# Patient Record
Sex: MALE | Race: OTHER | NOT HISPANIC OR LATINO
[De-identification: names, ages, dates, MRNs, and addresses within clinical notes are randomized per-mention and may not be internally consistent; named-entity substitution may affect disease eponyms.]

---

## 2023-05-26 PROBLEM — Z00.00 ENCOUNTER FOR PREVENTIVE HEALTH EXAMINATION: Status: ACTIVE | Noted: 2023-05-26

## 2023-05-30 ENCOUNTER — NON-APPOINTMENT (OUTPATIENT)
Age: 73
End: 2023-05-30

## 2023-05-31 ENCOUNTER — OUTPATIENT (OUTPATIENT)
Dept: OUTPATIENT SERVICES | Facility: HOSPITAL | Age: 73
LOS: 1 days | Discharge: HOME | End: 2023-05-31
Payer: COMMERCIAL

## 2023-05-31 ENCOUNTER — APPOINTMENT (OUTPATIENT)
Dept: WOUND CARE | Facility: HOSPITAL | Age: 73
End: 2023-05-31
Payer: COMMERCIAL

## 2023-05-31 VITALS
OXYGEN SATURATION: 98 % | HEIGHT: 70 IN | HEART RATE: 54 BPM | WEIGHT: 175 LBS | BODY MASS INDEX: 25.05 KG/M2 | DIASTOLIC BLOOD PRESSURE: 96 MMHG | RESPIRATION RATE: 18 BRPM | SYSTOLIC BLOOD PRESSURE: 206 MMHG

## 2023-05-31 DIAGNOSIS — Z78.9 OTHER SPECIFIED HEALTH STATUS: ICD-10-CM

## 2023-05-31 DIAGNOSIS — E55.9 VITAMIN D DEFICIENCY, UNSPECIFIED: ICD-10-CM

## 2023-05-31 DIAGNOSIS — L97.421 NON-PRESSURE CHRONIC ULCER OF LEFT HEEL AND MIDFOOT LIMITED TO BREAKDOWN OF SKIN: ICD-10-CM

## 2023-05-31 DIAGNOSIS — L08.9 LOCAL INFECTION OF THE SKIN AND SUBCUTANEOUS TISSUE, UNSPECIFIED: ICD-10-CM

## 2023-05-31 DIAGNOSIS — E78.5 HYPERLIPIDEMIA, UNSPECIFIED: ICD-10-CM

## 2023-05-31 DIAGNOSIS — E11.9 TYPE 2 DIABETES MELLITUS W/OUT COMPLICATIONS: ICD-10-CM

## 2023-05-31 DIAGNOSIS — I10 ESSENTIAL (PRIMARY) HYPERTENSION: ICD-10-CM

## 2023-05-31 DIAGNOSIS — E03.9 HYPOTHYROIDISM, UNSPECIFIED: ICD-10-CM

## 2023-05-31 DIAGNOSIS — L97.529 NON-PRESSURE CHRONIC ULCER OF OTHER PART OF LEFT FOOT WITH UNSPECIFIED SEVERITY: ICD-10-CM

## 2023-05-31 DIAGNOSIS — S91.309A UNSPECIFIED OPEN WOUND, UNSPECIFIED FOOT, INITIAL ENCOUNTER: ICD-10-CM

## 2023-05-31 PROCEDURE — 87070 CULTURE OTHR SPECIMN AEROBIC: CPT

## 2023-05-31 PROCEDURE — 87077 CULTURE AEROBIC IDENTIFY: CPT

## 2023-05-31 PROCEDURE — G0463: CPT

## 2023-05-31 PROCEDURE — 87186 SC STD MICRODIL/AGAR DIL: CPT

## 2023-05-31 PROCEDURE — 99203 OFFICE O/P NEW LOW 30 MIN: CPT

## 2023-05-31 RX ORDER — HYDROCHLOROTHIAZIDE 12.5 MG/1
12.5 TABLET ORAL DAILY
Refills: 0 | Status: ACTIVE | COMMUNITY
Start: 2023-05-31

## 2023-05-31 RX ORDER — METOPROLOL SUCCINATE 25 MG/1
25 TABLET, EXTENDED RELEASE ORAL
Qty: 30 | Refills: 0 | Status: ACTIVE | COMMUNITY
Start: 2023-05-31

## 2023-05-31 RX ORDER — SITAGLIPTIN AND METFORMIN HYDROCHLORIDE 50; 1000 MG/1; MG/1
50-1000 TABLET, FILM COATED ORAL DAILY
Refills: 0 | Status: ACTIVE | COMMUNITY
Start: 2023-05-31

## 2023-05-31 RX ORDER — ERGOCALCIFEROL 1.25 MG/1
1.25 MG CAPSULE, LIQUID FILLED ORAL WEEKLY
Refills: 0 | Status: ACTIVE | COMMUNITY
Start: 2023-05-31

## 2023-05-31 RX ORDER — ASPIRIN ENTERIC COATED TABLETS 81 MG 81 MG/1
81 TABLET, DELAYED RELEASE ORAL
Refills: 0 | Status: ACTIVE | COMMUNITY
Start: 2023-05-31

## 2023-06-02 LAB
-  AMOXICILLIN/CLAVULANIC ACID: SIGNIFICANT CHANGE UP
-  AMPICILLIN/SULBACTAM: SIGNIFICANT CHANGE UP
-  AMPICILLIN: SIGNIFICANT CHANGE UP
-  CEFAZOLIN: SIGNIFICANT CHANGE UP
-  CEFTRIAXONE: SIGNIFICANT CHANGE UP
-  CIPROFLOXACIN: SIGNIFICANT CHANGE UP
-  CLINDAMYCIN: SIGNIFICANT CHANGE UP
-  DAPTOMYCIN: SIGNIFICANT CHANGE UP
-  ERYTHROMYCIN: SIGNIFICANT CHANGE UP
-  GENTAMICIN: SIGNIFICANT CHANGE UP
-  LEVOFLOXACIN: SIGNIFICANT CHANGE UP
-  LINEZOLID: SIGNIFICANT CHANGE UP
-  MEROPENEM: SIGNIFICANT CHANGE UP
-  MOXIFLOXACIN: SIGNIFICANT CHANGE UP
-  OXACILLIN: SIGNIFICANT CHANGE UP
-  PENICILLIN: SIGNIFICANT CHANGE UP
-  RIFAMPIN: SIGNIFICANT CHANGE UP
-  TETRACYCLINE: SIGNIFICANT CHANGE UP
-  TRIMETHOPRIM/SULFAMETHOXAZOLE: SIGNIFICANT CHANGE UP
-  VANCOMYCIN: SIGNIFICANT CHANGE UP
CULTURE RESULTS: SIGNIFICANT CHANGE UP
METHOD TYPE: SIGNIFICANT CHANGE UP
ORGANISM # SPEC MICROSCOPIC CNT: SIGNIFICANT CHANGE UP
ORGANISM # SPEC MICROSCOPIC CNT: SIGNIFICANT CHANGE UP
SPECIMEN SOURCE: SIGNIFICANT CHANGE UP

## 2023-06-05 DIAGNOSIS — L97.422 NON-PRESSURE CHRONIC ULCER OF LEFT HEEL AND MIDFOOT WITH FAT LAYER EXPOSED: ICD-10-CM

## 2023-06-05 DIAGNOSIS — Z79.84 LONG TERM (CURRENT) USE OF ORAL HYPOGLYCEMIC DRUGS: ICD-10-CM

## 2023-06-05 DIAGNOSIS — E11.621 TYPE 2 DIABETES MELLITUS WITH FOOT ULCER: ICD-10-CM

## 2023-06-05 DIAGNOSIS — E11.40 TYPE 2 DIABETES MELLITUS WITH DIABETIC NEUROPATHY, UNSPECIFIED: ICD-10-CM

## 2023-06-05 DIAGNOSIS — I10 ESSENTIAL (PRIMARY) HYPERTENSION: ICD-10-CM

## 2023-06-05 DIAGNOSIS — E78.5 HYPERLIPIDEMIA, UNSPECIFIED: ICD-10-CM

## 2023-06-05 DIAGNOSIS — Z79.899 OTHER LONG TERM (CURRENT) DRUG THERAPY: ICD-10-CM

## 2023-06-05 DIAGNOSIS — Z79.890 HORMONE REPLACEMENT THERAPY: ICD-10-CM

## 2023-06-05 DIAGNOSIS — Z79.82 LONG TERM (CURRENT) USE OF ASPIRIN: ICD-10-CM

## 2023-06-05 PROBLEM — L97.421: Status: ACTIVE | Noted: 2023-06-05

## 2023-06-05 NOTE — REVIEW OF SYSTEMS
[Arthralgias] : arthralgias [Joint Stiffness] : joint stiffness [Skin Wound] : skin wound [Negative] : Heme/Lymph [Fever] : no fever [Chills] : no chills [Eye Pain] : no eye pain [Earache] : no earache [Shortness Of Breath] : no shortness of breath [Wheezing] : no wheezing [Abdominal Pain] : no abdominal pain [Anxiety] : no anxiety [FreeTextEntry5] : HTN, HLD [de-identified] : DFU 3 plantar left 1st metatarsal head , clean , granular , skin ,subcutaneous and fat  [de-identified] : NIDDM with neuropathy  [de-identified] : NIDDM

## 2023-06-05 NOTE — PHYSICAL EXAM
[2 x 2] : 2 x 2  [0] : left 0 [1+] : left 1+ [Ankle Swelling (On Exam)] : present [Ankle Swelling Bilaterally] : bilaterally  [Varicose Veins Of Lower Extremities] : bilaterally [Ankle Swelling On The Right] : mild [Skin Ulcer] : ulcer [Alert] : alert [Oriented to Person] : oriented to person [Oriented to Place] : oriented to place [Oriented to Time] : oriented to time [Calm] : calm [] : not present [Purpura] : no purpura  [Petechiae] : no petechiae [Skin Induration] : no induration [de-identified] : calm [de-identified] : HTN, HLD [de-identified] : DFU 3 plantar left 1st metatarsal head  [FreeTextEntry1] : Plantar Foot- 1st metatarsal [FreeTextEntry2] : 0.5 [FreeTextEntry3] : 0.8 [FreeTextEntry4] : 0.3 [de-identified] : intact [de-identified] : Alginate Ag [de-identified] : Mechanically cleansed with 0.9% NS and sterile gauze\par \par Dry Dressing\par \par Medipore tape\par \par Kerlix [TWNoteComboBox1] : Left [TWNoteComboBox4] : None [TWNoteComboBox5] : No [TWNoteComboBox6] : Diabetic [de-identified] : No [de-identified] : None [de-identified] : None [de-identified] : 100% [de-identified] : No [de-identified] : 3x Weekly [de-identified] : Primary Dressing

## 2023-06-05 NOTE — ASSESSMENT
[Verbal] : Verbal [Demo] : Demo [Patient] : Patient [Good - alert, interested, motivated] : Good - alert, interested, motivated [Verbalizes knowledge/Understanding] : Verbalizes knowledge/understanding [Dressing changes] : dressing changes [Foot Care] : foot care [Skin Care] : skin care [Pressure relief] : pressure relief [Signs and symptoms of infection] : sign and symptoms of infection [Nutrition] : nutrition [How and When to Call] : how and when to call [Off-loading] : off-loading [Patient responsibility to plan of care] : patient responsibility to plan of care [] : Yes [Home] : Home [Stable] : stable [Ambulatory] : Ambulatory [Not Applicable - Long Term Care/Home Health Agency] : Long Term Care/Home Health Agency: Not Applicable [Glycemic Control] : glycemic control [FreeTextEntry2] : Pt will maintain skin integrity\par Pt will monitor wound for s/s of infection\par Pt will adhere to proper diet for optimal wound healing\par  [FreeTextEntry3] : N/A Initial assessment [FreeTextEntry4] : Xray of left foot ordered.  \par Labs ordered\par \par Authorization for vascular studies submitted\par MRI results to be obtained from [facility].\par \par Follow up in 1 week.

## 2023-06-05 NOTE — PLAN
[FreeTextEntry1] : Patient had MRI at out side facility , patient sent for x rays , blood tests and vascular studies . PTR 1 week to review all studies . Continue wound care and off loading , PTR 1week Patient was told if there are signs of infection ( fever, chills, nausea, vomiting ) or changes in the condition of the wound ( pain , cellulitis , purulent drainage or odor ) they should proceed to the ER as soon as possible\par Spent 30 minutes for patient care and medical decision making.\par

## 2023-06-05 NOTE — HISTORY OF PRESENT ILLNESS
[FreeTextEntry1] : DFU 3 plantar left 1st metatarsal head , skin , subcutaneous and fat , 3 months duration

## 2023-06-06 ENCOUNTER — NON-APPOINTMENT (OUTPATIENT)
Age: 73
End: 2023-06-06

## 2023-06-07 ENCOUNTER — OUTPATIENT (OUTPATIENT)
Dept: OUTPATIENT SERVICES | Facility: HOSPITAL | Age: 73
LOS: 1 days | Discharge: HOME | End: 2023-06-07
Payer: COMMERCIAL

## 2023-06-07 ENCOUNTER — EMERGENCY (EMERGENCY)
Facility: HOSPITAL | Age: 73
LOS: 1 days | End: 2023-06-07
Payer: COMMERCIAL

## 2023-06-07 ENCOUNTER — APPOINTMENT (OUTPATIENT)
Dept: WOUND CARE | Facility: HOSPITAL | Age: 73
End: 2023-06-07
Payer: COMMERCIAL

## 2023-06-07 VITALS
DIASTOLIC BLOOD PRESSURE: 95 MMHG | RESPIRATION RATE: 16 BRPM | OXYGEN SATURATION: 97 % | SYSTOLIC BLOOD PRESSURE: 212 MMHG | HEART RATE: 63 BPM

## 2023-06-07 VITALS
HEIGHT: 70 IN | DIASTOLIC BLOOD PRESSURE: 96 MMHG | SYSTOLIC BLOOD PRESSURE: 219 MMHG | RESPIRATION RATE: 18 BRPM | BODY MASS INDEX: 25.05 KG/M2 | WEIGHT: 175 LBS | HEART RATE: 52 BPM | OXYGEN SATURATION: 98 % | TEMPERATURE: 97.7 F

## 2023-06-07 DIAGNOSIS — S91.309A UNSPECIFIED OPEN WOUND, UNSPECIFIED FOOT, INITIAL ENCOUNTER: ICD-10-CM

## 2023-06-07 PROCEDURE — L9992: CPT

## 2023-06-07 PROCEDURE — 99213 OFFICE O/P EST LOW 20 MIN: CPT

## 2023-06-07 PROCEDURE — G0463: CPT

## 2023-06-07 RX ORDER — LEVOTHYROXINE SODIUM 0.03 MG/1
25 TABLET ORAL
Refills: 0 | Status: DISCONTINUED | COMMUNITY
Start: 2023-05-31 | End: 2023-06-07

## 2023-06-07 RX ORDER — LEVOTHYROXINE SODIUM 0.05 MG/1
50 TABLET ORAL DAILY
Qty: 90 | Refills: 1 | Status: ACTIVE | COMMUNITY
Start: 2023-06-07

## 2023-06-07 RX ORDER — AMOXICILLIN AND CLAVULANATE POTASSIUM 500; 125 MG/1; MG/1
500-125 TABLET, FILM COATED ORAL
Refills: 0 | Status: DISCONTINUED | COMMUNITY
Start: 2023-05-31 | End: 2023-06-07

## 2023-06-07 RX ORDER — ATORVASTATIN CALCIUM 10 MG/1
10 TABLET, FILM COATED ORAL
Refills: 0 | Status: ACTIVE | COMMUNITY
Start: 2023-05-31

## 2023-06-07 NOTE — HISTORY OF PRESENT ILLNESS
[FreeTextEntry1] : 71 yo IM, here for f/u of chronic left plantar DFU-[employer] gr. 3.  Pt states he went to Dr. Amezcua's office 2 wks ago and had a MRI of his LLE-no report available today. Pt also advised to go for Xrays of botth feet last wk, but declined saying he had them done 2 wks ago at his podiatrist's office-no report available. Podiatrist office called today, but closed. Going for vascular studies tomorrow and then to meet with our vasc. surgeon, Dr. Luna.\par    In addition, pt's BP continues to be very high at 200s/96. Denies any HA, visual problems, dizziness, nausea,or weakness. Pt states he is under the care of his PCP, Dr. JANESSA Cain, who changed his meds recently.  Due to his high BP , I strongly advised pt to go to the ER now to get it under control. I emphasized the higher risks of CVA, MI, DVT, and even death with such high BP. Pt states he is going to see Dr. Cain after leaving here. I still recommended he go to the ER for further tx/assessment, but pt refusing. BP rechecked prior to leaving and again noted to be in the 200s/90s.\par \par \par Addendum-after pt had left, about one hr later, got fax of his left foot MRI which showed small ulcer, but no osteo.

## 2023-06-07 NOTE — PLAN
[FreeTextEntry1] : offload\par ag alginate, DD\par get Xray , MRI reports\par vascular studies this wk then vascular consult\par f/u 1 wk\par \par time spent- 29 mins.

## 2023-06-07 NOTE — PHYSICAL EXAM
[2 x 2] : 2 x 2  [Normal Thyroid] : the thyroid was normal [Normal Breath Sounds] : Normal breath sounds [Normal Heart Sounds] : normal heart sounds [Normal Rate and Rhythm] : normal rate and rhythm [Alert] : alert [Oriented to Person] : oriented to person [Oriented to Place] : oriented to place [Oriented to Time] : oriented to time [Anxious] : anxious [JVD] : no jugular venous distention  [Abdomen Masses] : No abdominal massess [Abdomen Tenderness] : ~T ~M No abdominal tenderness [Tender] : nontender [Enlarged] : not enlarged [de-identified] : elderly IM, NAD, alert, Ox3. Denies any sx. [FreeTextEntry1] : Plantar Foot- 1st metatarsal [FreeTextEntry2] : 0.5 [FreeTextEntry3] : 0.5 [FreeTextEntry4] : 0.2 [de-identified] : intact [de-identified] : Alginate Ag [de-identified] : Mechanically cleansed with 0.9% NS and sterile gauze\par \par Medipore tape [TWNoteComboBox1] : Left [TWNoteComboBox4] : None [TWNoteComboBox5] : No [TWNoteComboBox6] : Diabetic [de-identified] : No [de-identified] : None [de-identified] : None [de-identified] : 100% [de-identified] : No [de-identified] : 3x Weekly [de-identified] : Primary Dressing

## 2023-06-07 NOTE — ASSESSMENT
[Verbal] : Verbal [Demo] : Demo [Patient] : Patient [Good - alert, interested, motivated] : Good - alert, interested, motivated [Verbalizes knowledge/Understanding] : Verbalizes knowledge/understanding [Dressing changes] : dressing changes [Skin Care] : skin care [Signs and symptoms of infection] : sign and symptoms of infection [Nutrition] : nutrition [How and When to Call] : how and when to call [Off-loading] : off-loading [Patient responsibility to plan of care] : patient responsibility to plan of care [Stable] : stable [Home] : Home [Ambulatory] : Ambulatory [Not Applicable - Long Term Care/Home Health Agency] : Long Term Care/Home Health Agency: Not Applicable [] : No [FreeTextEntry2] : Pt will maintain skin integrity\par Pt will monitor wound for s/s of infection\par Pt will adhere to proper diet for optimal wound healing [FreeTextEntry4] : ***PATIENT EDUCATED ABOUT RISKS DANGEROUSLY HIGH BLOOD PRESSURE (219/96) AND URGED TO GO TO ED***\par \par Complete Vascular studies 06/08/2023\par Follow up with Dr. Chua (not scheduled)\par Follow up in 1 week\par \par RN spoke with Pt's primary DPM via telephone.  DPM faxed X-ray and MRI report. Will be scanned into pt's chart

## 2023-06-07 NOTE — REVIEW OF SYSTEMS
[Arthralgias] : arthralgias [Joint Stiffness] : joint stiffness [Skin Wound] : skin wound [Negative] : Heme/Lymph [Fever] : no fever [Chills] : no chills [Eye Pain] : no eye pain [Earache] : no earache [Shortness Of Breath] : no shortness of breath [Wheezing] : no wheezing [Abdominal Pain] : no abdominal pain [Anxiety] : no anxiety [FreeTextEntry5] : HTN, HLD [de-identified] : DFU 3 plantar left 1st metatarsal head , clean , granular , skin ,subcutaneous and fat  [de-identified] : NIDDM with neuropathy  [de-identified] : NIDDM

## 2023-06-08 ENCOUNTER — OUTPATIENT (OUTPATIENT)
Dept: OUTPATIENT SERVICES | Facility: HOSPITAL | Age: 73
LOS: 1 days | End: 2023-06-08
Payer: COMMERCIAL

## 2023-06-08 DIAGNOSIS — E11.621 TYPE 2 DIABETES MELLITUS WITH FOOT ULCER: ICD-10-CM

## 2023-06-08 DIAGNOSIS — Z79.899 OTHER LONG TERM (CURRENT) DRUG THERAPY: ICD-10-CM

## 2023-06-08 DIAGNOSIS — Z79.890 HORMONE REPLACEMENT THERAPY: ICD-10-CM

## 2023-06-08 DIAGNOSIS — I10 ESSENTIAL (PRIMARY) HYPERTENSION: ICD-10-CM

## 2023-06-08 DIAGNOSIS — E11.40 TYPE 2 DIABETES MELLITUS WITH DIABETIC NEUROPATHY, UNSPECIFIED: ICD-10-CM

## 2023-06-08 DIAGNOSIS — L97.422 NON-PRESSURE CHRONIC ULCER OF LEFT HEEL AND MIDFOOT WITH FAT LAYER EXPOSED: ICD-10-CM

## 2023-06-08 DIAGNOSIS — E03.9 HYPOTHYROIDISM, UNSPECIFIED: ICD-10-CM

## 2023-06-08 DIAGNOSIS — Z79.82 LONG TERM (CURRENT) USE OF ASPIRIN: ICD-10-CM

## 2023-06-08 DIAGNOSIS — Z79.84 LONG TERM (CURRENT) USE OF ORAL HYPOGLYCEMIC DRUGS: ICD-10-CM

## 2023-06-08 DIAGNOSIS — E55.9 VITAMIN D DEFICIENCY, UNSPECIFIED: ICD-10-CM

## 2023-06-08 DIAGNOSIS — E78.5 HYPERLIPIDEMIA, UNSPECIFIED: ICD-10-CM

## 2023-06-08 PROCEDURE — 93923 UPR/LXTR ART STDY 3+ LVLS: CPT | Mod: 26

## 2023-06-08 PROCEDURE — 93923 UPR/LXTR ART STDY 3+ LVLS: CPT

## 2023-06-13 ENCOUNTER — NON-APPOINTMENT (OUTPATIENT)
Age: 73
End: 2023-06-13

## 2023-06-14 ENCOUNTER — APPOINTMENT (OUTPATIENT)
Dept: WOUND CARE | Facility: HOSPITAL | Age: 73
End: 2023-06-14
Payer: COMMERCIAL

## 2023-06-14 ENCOUNTER — OUTPATIENT (OUTPATIENT)
Dept: OUTPATIENT SERVICES | Facility: HOSPITAL | Age: 73
LOS: 1 days | Discharge: HOME | End: 2023-06-14
Payer: COMMERCIAL

## 2023-06-14 VITALS
BODY MASS INDEX: 25.05 KG/M2 | RESPIRATION RATE: 18 BRPM | WEIGHT: 175 LBS | OXYGEN SATURATION: 98 % | SYSTOLIC BLOOD PRESSURE: 219 MMHG | TEMPERATURE: 97.9 F | DIASTOLIC BLOOD PRESSURE: 93 MMHG | HEART RATE: 56 BPM | HEIGHT: 70 IN

## 2023-06-14 DIAGNOSIS — E11.621 TYPE 2 DIABETES MELLITUS WITH FOOT ULCER: ICD-10-CM

## 2023-06-14 PROCEDURE — 99213 OFFICE O/P EST LOW 20 MIN: CPT

## 2023-06-14 PROCEDURE — G0463: CPT

## 2023-06-14 RX ORDER — LOSARTAN POTASSIUM 100 MG/1
100 TABLET, FILM COATED ORAL
Refills: 0 | Status: ACTIVE | COMMUNITY
Start: 2023-05-31

## 2023-06-14 NOTE — PHYSICAL EXAM
[2 x 2] : 2 x 2  [0] : left 0 [1+] : left 1+ [Ankle Swelling (On Exam)] : present [Ankle Swelling Bilaterally] : bilaterally  [Varicose Veins Of Lower Extremities] : bilaterally [Ankle Swelling On The Right] : mild [] : bilaterally [Purpura] : no purpura  [Petechiae] : no petechiae [Skin Ulcer] : ulcer [Skin Induration] : no induration [Alert] : alert [Oriented to Person] : oriented to person [Oriented to Place] : oriented to place [Oriented to Time] : oriented to time [Calm] : calm [de-identified] : A&Ox3, NAD [de-identified] : HTN, HLD [de-identified] : 5 out of 5 strength in all quadrants bilaterally [de-identified] : Left foot plantar first metatarsal phalangeal joint ulceration down to skin, subcutaneous tissue, fat [de-identified] : Loss of light and sensation bilaterally [FreeTextEntry1] : Plantar Foot- 1st Met [FreeTextEntry2] : 0.5 [FreeTextEntry3] : 0.5 [FreeTextEntry4] : 0.3 [de-identified] : intact [de-identified] : Allevyn pad with hole in Allevyn border, Alginate Ag [de-identified] : Mechanically cleansed with 0.9% NS and sterile gauze\par \par Dry dressing\par \par Medipore tape [TWNoteComboBox1] : Left [TWNoteComboBox4] : None [TWNoteComboBox5] : No [TWNoteComboBox6] : Pressure [de-identified] : No [de-identified] : other [de-identified] : None [de-identified] : None [de-identified] : 100% [de-identified] : No [de-identified] : 3x Weekly [de-identified] : Primary Dressing

## 2023-06-14 NOTE — HISTORY OF PRESENT ILLNESS
[FreeTextEntry1] : Patient seen for follow-up of left foot plantar first metatarsal joint diabetic foot ulcer down to skin, subcutaneous tissue, fat. Patient relates that he was able to obtain vascular studies as advised.  Of note is that patient presented today without any dressings with exposed ulceration in his shoe gear.  No osteomyelitis was noted on MRI.

## 2023-06-14 NOTE — PLAN
[FreeTextEntry1] : Patient examined and evaluated at this time.  MRI results reviewed with patient at this time.  Patient to see Dr. Small for vascular evaluation at this time.  Continue wound care and off loading , PTR 1week.  Patient was told if there are signs of infection ( fever, chills, nausea, vomiting ) or changes in the condition of the wound ( pain , cellulitis , purulent drainage or odor ) they should proceed to the ER as soon as possible\par Spent 20 minutes for patient care and medical decision making.\par

## 2023-06-14 NOTE — REVIEW OF SYSTEMS
[Fever] : no fever [Chills] : no chills [Eye Pain] : no eye pain [Earache] : no earache [Shortness Of Breath] : no shortness of breath [Wheezing] : no wheezing [Abdominal Pain] : no abdominal pain [Arthralgias] : arthralgias [Joint Stiffness] : joint stiffness [Skin Wound] : skin wound [Anxiety] : no anxiety [Negative] : Heme/Lymph [FreeTextEntry5] : HTN, HLD [de-identified] : DFU 3 plantar left 1st metatarsal head , clean , granular , skin ,subcutaneous and fat  [de-identified] : NIDDM with neuropathy  [de-identified] : NIDDM

## 2023-06-14 NOTE — ASSESSMENT
[Verbal] : Verbal [Demo] : Demo [Patient] : Patient [Good - alert, interested, motivated] : Good - alert, interested, motivated [Verbalizes knowledge/Understanding] : Verbalizes knowledge/understanding [Dressing changes] : dressing changes [Skin Care] : skin care [Signs and symptoms of infection] : sign and symptoms of infection [Nutrition] : nutrition [How and When to Call] : how and when to call [Patient responsibility to plan of care] : patient responsibility to plan of care [] : Yes [Stable] : stable [Home] : Home [Ambulatory] : Ambulatory [Not Applicable - Long Term Care/Home Health Agency] : Long Term Care/Home Health Agency: Not Applicable [FreeTextEntry2] : Pt will maintain skin integrity\par Pt will monitor wounds for s/s of infection\par Pt will adhere to proper diet for optimal wound healing [FreeTextEntry4] : Right plantar foot tx:  Allevyn border, Alginate Ag, DD, Medipore tape\par \par Orthotist referral\par Schedule consultation with Dr. Chua in July to review vascular studies \par Follow up in 1 week\par

## 2023-06-15 DIAGNOSIS — I10 ESSENTIAL (PRIMARY) HYPERTENSION: ICD-10-CM

## 2023-06-15 DIAGNOSIS — Z79.890 HORMONE REPLACEMENT THERAPY: ICD-10-CM

## 2023-06-15 DIAGNOSIS — E11.621 TYPE 2 DIABETES MELLITUS WITH FOOT ULCER: ICD-10-CM

## 2023-06-15 DIAGNOSIS — Z79.84 LONG TERM (CURRENT) USE OF ORAL HYPOGLYCEMIC DRUGS: ICD-10-CM

## 2023-06-15 DIAGNOSIS — Z79.82 LONG TERM (CURRENT) USE OF ASPIRIN: ICD-10-CM

## 2023-06-15 DIAGNOSIS — L97.422 NON-PRESSURE CHRONIC ULCER OF LEFT HEEL AND MIDFOOT WITH FAT LAYER EXPOSED: ICD-10-CM

## 2023-06-15 DIAGNOSIS — E03.9 HYPOTHYROIDISM, UNSPECIFIED: ICD-10-CM

## 2023-06-15 DIAGNOSIS — E55.9 VITAMIN D DEFICIENCY, UNSPECIFIED: ICD-10-CM

## 2023-06-15 DIAGNOSIS — E78.5 HYPERLIPIDEMIA, UNSPECIFIED: ICD-10-CM

## 2023-06-15 DIAGNOSIS — Z79.899 OTHER LONG TERM (CURRENT) DRUG THERAPY: ICD-10-CM

## 2023-06-15 DIAGNOSIS — E11.40 TYPE 2 DIABETES MELLITUS WITH DIABETIC NEUROPATHY, UNSPECIFIED: ICD-10-CM

## 2023-07-02 ENCOUNTER — NON-APPOINTMENT (OUTPATIENT)
Age: 73
End: 2023-07-02

## 2023-07-03 ENCOUNTER — APPOINTMENT (OUTPATIENT)
Dept: WOUND CARE | Facility: HOSPITAL | Age: 73
End: 2023-07-03
Payer: COMMERCIAL

## 2023-07-03 ENCOUNTER — OUTPATIENT (OUTPATIENT)
Dept: OUTPATIENT SERVICES | Facility: HOSPITAL | Age: 73
LOS: 1 days | Discharge: HOME | End: 2023-07-03
Payer: COMMERCIAL

## 2023-07-03 VITALS
BODY MASS INDEX: 25.05 KG/M2 | HEIGHT: 70 IN | TEMPERATURE: 97.8 F | WEIGHT: 175 LBS | OXYGEN SATURATION: 98 % | RESPIRATION RATE: 18 BRPM | DIASTOLIC BLOOD PRESSURE: 84 MMHG | SYSTOLIC BLOOD PRESSURE: 199 MMHG | HEART RATE: 56 BPM

## 2023-07-03 VITALS
HEIGHT: 70 IN | WEIGHT: 175 LBS | DIASTOLIC BLOOD PRESSURE: 84 MMHG | HEART RATE: 56 BPM | BODY MASS INDEX: 25.05 KG/M2 | SYSTOLIC BLOOD PRESSURE: 199 MMHG | TEMPERATURE: 97.8 F | RESPIRATION RATE: 18 BRPM

## 2023-07-03 DIAGNOSIS — E11.621 TYPE 2 DIABETES MELLITUS WITH FOOT ULCER: ICD-10-CM

## 2023-07-03 PROCEDURE — G0463: CPT

## 2023-07-03 PROCEDURE — 99213 OFFICE O/P EST LOW 20 MIN: CPT

## 2023-07-03 NOTE — ASSESSMENT
[Verbal] : Verbal [Demo] : Demo [Patient] : Patient [Good - alert, interested, motivated] : Good - alert, interested, motivated [Verbalizes knowledge/Understanding] : Verbalizes knowledge/understanding [Dressing changes] : dressing changes [Foot Care] : foot care [Skin Care] : skin care [Pressure relief] : pressure relief [Signs and symptoms of infection] : sign and symptoms of infection [How and When to Call] : how and when to call [Labs and Tests] : labs and tests [Off-loading] : off-loading [Patient responsibility to plan of care] : patient responsibility to plan of care [] : Yes [Stable] : stable [Home] : Home [Ambulatory] : Ambulatory [FreeTextEntry1] : No evidence of PVD.  SAMSON waveforms are normal on LLE>  [FreeTextEntry2] : Alteration in skin integrity- promote optimal skin integrity\par  [FreeTextEntry4] : Dr Geovanny Dsouza/ Photo taken\par Pt was scheduled to f/u to [facility] in 1 week but has not f/u in almost 3 weeks- Dr Geovanny Dsouza aware\par Pt seen by Dr Geovanny Dsouza for Vascular Consult- recent Vascular studies reviewed with pt by Dr Geovanny Dsouza- no need for further Vascular follow up as per Dr Geovanny Dsouza\par Pt has not been seen by Orthotist for Consult as of yet- requested by Dr Geovanny Dsouza- request for same resubmitted\par F/U with Dr Paz at [facility] in 1 week

## 2023-07-03 NOTE — HISTORY OF PRESENT ILLNESS
[FreeTextEntry1] : 73 yo M with hx of DFU after walking long distance in [place]. Progressing well. No osteo on MRI. HTN and hypercholesterolemia. Non smoker. On Statins and baby ASA.

## 2023-07-03 NOTE — PHYSICAL EXAM
[2+] : left 2+ [Ankle Swelling (On Exam)] : not present [Varicose Veins Of Lower Extremities] : not present [] : not present [de-identified] : L 1st MTH 2 mm ulcer, callous surrounding wound [FreeTextEntry1] : Left Plantar Foot 1st Met [FreeTextEntry2] : 0.3 [FreeTextEntry3] : 0.1 [FreeTextEntry4] : 0.1 [de-identified] : Scant Serosanguineous [de-identified] : Intact/ callus [de-identified] : Allevyn Ag [de-identified] : Cleansed with Normal saline\par  [de-identified] : None [de-identified] : None [de-identified] : 100% [de-identified] : No

## 2023-07-03 NOTE — ASSESSMENT
[Verbal] : Verbal [Demo] : Demo [Patient] : Patient [Good - alert, interested, motivated] : Good - alert, interested, motivated [Verbalizes knowledge/Understanding] : Verbalizes knowledge/understanding [Dressing changes] : dressing changes [Foot Care] : foot care [Skin Care] : skin care [Pressure relief] : pressure relief [Signs and symptoms of infection] : sign and symptoms of infection [How and When to Call] : how and when to call [Labs and Tests] : labs and tests [Off-loading] : off-loading [Patient responsibility to plan of care] : patient responsibility to plan of care [] : Yes [Stable] : stable [Home] : Home [Ambulatory] : Ambulatory [FreeTextEntry1] : No evidence of PVD.  SAMSON waveforms are normal on LLE>  [FreeTextEntry2] : Alteration in skin integrity- promote optimal skin integrity\par  [FreeTextEntry4] : Dr Geovanny Dsouza/ Photo taken\par Pt was scheduled to f/u to [facility] in 1 week but has not f/u in almost 3 weeks- Dr Geovanny Dsouza aware\par Pt seen by Dr Geovanny Dsouza for Vascular Consult- recent Vascular studies reviewed with pt by Dr Geovanny Dsouza- no need for further Vascular follow up as per Dr Geovanny Dsouza\par Pt has not been seen by Orthotist for Consult as of yet- requested by Dr Geovanny Dsouza- request for same resubmitted\par F/U with Dr Paz at [facility] in 1 week

## 2023-07-03 NOTE — DATA REVIEWED
[FreeTextEntry1] : \par ACC: [id number] EXAM: PHYSIOL EXTREM LOW 3+ LEV BI ORDERED BY: JEVON STOVALL\par \par PROCEDURE DATE: 06/08/2023\par \par \par \par INTERPRETATION: Clinical information: Nonsmoker, diabetic, hypertension, hyperlipidemia, presents with left leg pain.\par \par COMPARISON: None available.\par \par TECHNIQUE: Lower extremity arterial Doppler study.\par \par FINDINGS: Ankle brachial index measures 1.05 on the right and 1.29 on the left. No segmental arterial pressure gradient is present. Toe brachial index measures 0.75 on the right and 0.85 on the left.\par \par PVR waveforms are normal in amplitude and pulsatility from the thigh through the metatarsal level. Right digital PVRs are diminished in amplitude.\par \par IMPRESSION: No Doppler evidence of hemodynamically significant arterial inflow limitation in either lower extremity.\par \par --- End of Report ---

## 2023-07-03 NOTE — DATA REVIEWED
[FreeTextEntry1] : \par ACC: [id number] EXAM: PHYSIOL EXTREM LOW 3+ LEV BI ORDERED BY: JEVON STOVALL\par \par PROCEDURE DATE: 06/08/2023\par \par \par \par INTERPRETATION: Clinical information: Nonsmoker, diabetic, hypertension, hyperlipidemia, presents with left leg pain.\par \par COMPARISON: None available.\par \par TECHNIQUE: Lower extremity arterial Doppler study.\par \par FINDINGS: Ankle brachial index measures 1.05 on the right and 1.29 on the left. No segmental arterial pressure gradient is present. Toe brachial index measures 0.75 on the right and 0.85 on the left.\par \par PVR waveforms are normal in amplitude and pulsatility from the thigh through the metatarsal level. Right digital PVRs are diminished in amplitude.\par \par IMPRESSION: No Doppler evidence of hemodynamically significant arterial inflow limitation in either lower extremity.\par \par --- End of Report ---

## 2023-07-03 NOTE — HISTORY OF PRESENT ILLNESS
[FreeTextEntry1] : 71 yo M with hx of DFU after walking long distance in [place]. Progressing well. No osteo on MRI. HTN and hypercholesterolemia. Non smoker. On Statins and baby ASA.

## 2023-07-03 NOTE — REASON FOR VISIT
[Follow-Up: _____] : a [unfilled] follow-up visit [Consultation] : a consultation visit [FreeTextEntry1] : L MTH ulcer

## 2023-07-03 NOTE — PHYSICAL EXAM
[2+] : left 2+ [Ankle Swelling (On Exam)] : not present [Varicose Veins Of Lower Extremities] : not present [] : not present [de-identified] : L 1st MTH 2 mm ulcer, callous surrounding wound [FreeTextEntry1] : Left Plantar Foot 1st Met [FreeTextEntry2] : 0.3 [FreeTextEntry3] : 0.1 [FreeTextEntry4] : 0.1 [de-identified] : Scant Serosanguineous [de-identified] : Intact/ callus [de-identified] : Allevyn Ag [de-identified] : Cleansed with Normal saline\par  [de-identified] : None [de-identified] : None [de-identified] : 100% [de-identified] : No

## 2023-07-05 DIAGNOSIS — Z79.82 LONG TERM (CURRENT) USE OF ASPIRIN: ICD-10-CM

## 2023-07-05 DIAGNOSIS — E11.40 TYPE 2 DIABETES MELLITUS WITH DIABETIC NEUROPATHY, UNSPECIFIED: ICD-10-CM

## 2023-07-05 DIAGNOSIS — Z79.899 OTHER LONG TERM (CURRENT) DRUG THERAPY: ICD-10-CM

## 2023-07-05 DIAGNOSIS — Z79.84 LONG TERM (CURRENT) USE OF ORAL HYPOGLYCEMIC DRUGS: ICD-10-CM

## 2023-07-05 DIAGNOSIS — E11.621 TYPE 2 DIABETES MELLITUS WITH FOOT ULCER: ICD-10-CM

## 2023-07-05 DIAGNOSIS — L97.422 NON-PRESSURE CHRONIC ULCER OF LEFT HEEL AND MIDFOOT WITH FAT LAYER EXPOSED: ICD-10-CM

## 2023-07-05 DIAGNOSIS — Z79.890 HORMONE REPLACEMENT THERAPY: ICD-10-CM

## 2023-07-10 ENCOUNTER — APPOINTMENT (OUTPATIENT)
Dept: WOUND CARE | Facility: HOSPITAL | Age: 73
End: 2023-07-10
Payer: COMMERCIAL

## 2023-07-10 ENCOUNTER — OUTPATIENT (OUTPATIENT)
Dept: OUTPATIENT SERVICES | Facility: HOSPITAL | Age: 73
LOS: 1 days | Discharge: HOME | End: 2023-07-10
Payer: COMMERCIAL

## 2023-07-10 VITALS
DIASTOLIC BLOOD PRESSURE: 71 MMHG | BODY MASS INDEX: 25.05 KG/M2 | HEIGHT: 70 IN | SYSTOLIC BLOOD PRESSURE: 210 MMHG | WEIGHT: 175 LBS | OXYGEN SATURATION: 98 % | RESPIRATION RATE: 18 BRPM | TEMPERATURE: 97.7 F | HEART RATE: 50 BPM

## 2023-07-10 VITALS — DIASTOLIC BLOOD PRESSURE: 80 MMHG | SYSTOLIC BLOOD PRESSURE: 179 MMHG

## 2023-07-10 DIAGNOSIS — E11.621 TYPE 2 DIABETES MELLITUS WITH FOOT ULCER: ICD-10-CM

## 2023-07-10 PROCEDURE — 99213 OFFICE O/P EST LOW 20 MIN: CPT

## 2023-07-10 PROCEDURE — G0463: CPT

## 2023-07-11 DIAGNOSIS — E03.9 HYPOTHYROIDISM, UNSPECIFIED: ICD-10-CM

## 2023-07-11 DIAGNOSIS — I10 ESSENTIAL (PRIMARY) HYPERTENSION: ICD-10-CM

## 2023-07-11 DIAGNOSIS — L97.422 NON-PRESSURE CHRONIC ULCER OF LEFT HEEL AND MIDFOOT WITH FAT LAYER EXPOSED: ICD-10-CM

## 2023-07-11 DIAGNOSIS — Z79.899 OTHER LONG TERM (CURRENT) DRUG THERAPY: ICD-10-CM

## 2023-07-11 DIAGNOSIS — Z79.890 HORMONE REPLACEMENT THERAPY: ICD-10-CM

## 2023-07-11 DIAGNOSIS — E11.621 TYPE 2 DIABETES MELLITUS WITH FOOT ULCER: ICD-10-CM

## 2023-07-11 DIAGNOSIS — E55.9 VITAMIN D DEFICIENCY, UNSPECIFIED: ICD-10-CM

## 2023-07-11 DIAGNOSIS — Z79.82 LONG TERM (CURRENT) USE OF ASPIRIN: ICD-10-CM

## 2023-07-11 DIAGNOSIS — Z79.84 LONG TERM (CURRENT) USE OF ORAL HYPOGLYCEMIC DRUGS: ICD-10-CM

## 2023-07-11 DIAGNOSIS — E11.40 TYPE 2 DIABETES MELLITUS WITH DIABETIC NEUROPATHY, UNSPECIFIED: ICD-10-CM

## 2023-07-11 DIAGNOSIS — E78.5 HYPERLIPIDEMIA, UNSPECIFIED: ICD-10-CM

## 2023-07-12 NOTE — PHYSICAL EXAM
[2 x 2] : 2 x 2  [0] : left 0 [1+] : left 1+ [Ankle Swelling (On Exam)] : present [Ankle Swelling Bilaterally] : bilaterally  [Varicose Veins Of Lower Extremities] : bilaterally [Ankle Swelling On The Right] : mild [Skin Ulcer] : ulcer [Alert] : alert [Oriented to Person] : oriented to person [Oriented to Place] : oriented to place [Oriented to Time] : oriented to time [Calm] : calm [] : not present [Purpura] : no purpura  [Petechiae] : no petechiae [Skin Induration] : no induration [de-identified] : A&Ox3, NAD [de-identified] : HTN, HLD [de-identified] : 5 out of 5 strength in all quadrants bilaterally [de-identified] : Left foot plantar first metatarsal phalangeal joint ulceration down to skin, subcutaneous tissue, fat [de-identified] : Loss of light and sensation bilaterally [FreeTextEntry1] : Left Plantar Foot 1st Met  [FreeTextEntry2] : 0.3 [FreeTextEntry3] : 0.2 [FreeTextEntry4] : 0.4 [de-identified] : small serous [de-identified] : calloused [de-identified] : none [de-identified] : unable to visualize base [de-identified] : none [FreeTextEntry5] : none [de-identified] : Alginate Ag [de-identified] : Mechanically cleansed with sterile gauze and normal saline 0.9%\par Dry Dressing\par Cloth tape. [TWNoteComboBox5] : No [TWNoteComboBox6] : Diabetic [de-identified] : No [de-identified] : other [de-identified] : None [de-identified] : No [de-identified] : 3x Weekly [de-identified] : Primary Dressing

## 2023-07-12 NOTE — HISTORY OF PRESENT ILLNESS
[FreeTextEntry1] : Patient seen for follow-up of left foot plantar first metatarsal joint diabetic foot ulcer down to skin, subcutaneous tissue, fat. Patient relates that he was able to obtain vascular studies as advised.  Of note is that patient presented today without any dressings with exposed ulceration in his shoe gear.  No osteomyelitis was noted on MRI.\par \par 7/7/2023: Patient seen for follow-up of left foot plantar first metatarsal joint diabetic foot ulceration down to skin, subcutaneous tissue, fat.  Wound appears to be healing well at this time.  Patient denies any other complaints at this time.

## 2023-07-12 NOTE — ASSESSMENT
[Verbal] : Verbal [Demo] : Demo [Patient] : Patient [Good - alert, interested, motivated] : Good - alert, interested, motivated [Demonstrates independently] : demonstrates independently [Dressing changes] : dressing changes [Foot Care] : foot care [Skin Care] : skin care [Signs and symptoms of infection] : sign and symptoms of infection [Venous Disease] : venous disease [Nutrition] : nutrition [How and When to Call] : how and when to call [Labs and Tests] : labs and tests [Off-loading] : off-loading [Patient responsibility to plan of care] : patient responsibility to plan of care [Glycemic Control] : glycemic control [Stable] : stable [Home] : Home [Ambulatory] : Ambulatory [] : No [FreeTextEntry2] : Infection prevention \par Wound care (dressing changes)\par Maintain optimal skin integrity to high pressure areas\par Nutrition and wound healing\par Offloading the stress on skin structures and decreasing potential pathologic biomechanical influences.\par PT will maintain BP within individually acceptable range. [FreeTextEntry4] : Pt's blood pressure addressed during time of visit. \par Pt denies symptoms of HTN, Denies headache, blurred vision. DPM aware.\par Pt was advised to F/U with PCP for blood pressure management. Pt aware and will do the same.\par F/U 2 Weeks.

## 2023-07-12 NOTE — REVIEW OF SYSTEMS
[Arthralgias] : arthralgias [Joint Stiffness] : joint stiffness [Skin Wound] : skin wound [Negative] : Heme/Lymph [Fever] : no fever [Chills] : no chills [Eye Pain] : no eye pain [Earache] : no earache [Shortness Of Breath] : no shortness of breath [Wheezing] : no wheezing [Abdominal Pain] : no abdominal pain [Anxiety] : no anxiety [FreeTextEntry5] : HTN, HLD [de-identified] : DFU 3 plantar left 1st metatarsal head , clean , granular , skin ,subcutaneous and fat  [de-identified] : NIDDM with neuropathy  [de-identified] : NIDDM

## 2023-07-23 ENCOUNTER — NON-APPOINTMENT (OUTPATIENT)
Age: 73
End: 2023-07-23

## 2023-07-24 ENCOUNTER — APPOINTMENT (OUTPATIENT)
Dept: WOUND CARE | Facility: HOSPITAL | Age: 73
End: 2023-07-24
Payer: COMMERCIAL

## 2023-07-24 ENCOUNTER — RESULT REVIEW (OUTPATIENT)
Age: 73
End: 2023-07-24

## 2023-07-24 ENCOUNTER — OUTPATIENT (OUTPATIENT)
Dept: OUTPATIENT SERVICES | Facility: HOSPITAL | Age: 73
LOS: 1 days | Discharge: HOME | End: 2023-07-24
Payer: COMMERCIAL

## 2023-07-24 VITALS
BODY MASS INDEX: 25.05 KG/M2 | HEIGHT: 70 IN | HEART RATE: 50 BPM | OXYGEN SATURATION: 99 % | TEMPERATURE: 97.9 F | DIASTOLIC BLOOD PRESSURE: 70 MMHG | WEIGHT: 175 LBS | RESPIRATION RATE: 18 BRPM | SYSTOLIC BLOOD PRESSURE: 181 MMHG

## 2023-07-24 DIAGNOSIS — E11.621 TYPE 2 DIABETES MELLITUS WITH FOOT ULCER: ICD-10-CM

## 2023-07-24 PROCEDURE — 73630 X-RAY EXAM OF FOOT: CPT | Mod: 26,50

## 2023-07-24 PROCEDURE — 99213 OFFICE O/P EST LOW 20 MIN: CPT

## 2023-07-24 PROCEDURE — G0463: CPT

## 2023-07-24 PROCEDURE — 73630 X-RAY EXAM OF FOOT: CPT

## 2023-07-24 NOTE — REVIEW OF SYSTEMS
[Fever] : no fever [Chills] : no chills [Eye Pain] : no eye pain [Earache] : no earache [Shortness Of Breath] : no shortness of breath [Wheezing] : no wheezing [Abdominal Pain] : no abdominal pain [Arthralgias] : arthralgias [Joint Stiffness] : joint stiffness [Skin Wound] : skin wound [Anxiety] : no anxiety [Negative] : Heme/Lymph [FreeTextEntry5] : HTN, HLD [de-identified] : DFU 3 plantar left 1st metatarsal head , clean , granular , skin ,subcutaneous and fat , smaller and samuel  [de-identified] : NIDDM with neuropathy  [de-identified] : NIDDM

## 2023-07-24 NOTE — ASSESSMENT
[Verbal] : Verbal [Demo] : Demo [Patient] : Patient [Good - alert, interested, motivated] : Good - alert, interested, motivated [Verbalizes knowledge/Understanding] : Verbalizes knowledge/understanding [Dressing changes] : dressing changes [Foot Care] : foot care [Skin Care] : skin care [Pressure relief] : pressure relief [Signs and symptoms of infection] : sign and symptoms of infection [How and When to Call] : how and when to call [Labs and Tests] : labs and tests [Off-loading] : off-loading [Patient responsibility to plan of care] : patient responsibility to plan of care [] : Yes [Stable] : stable [Home] : Home [Ambulatory] : Ambulatory [FreeTextEntry2] : Alteration in skin integrity- promote optimal skin integrity\par  [FreeTextEntry4] : Dr Staton/ Photo taken\par Foot xrays ordered by Dr Staton- pt to [facility] today for Foot xrays\par Pt instructed by Dr Staton to bring MRI Disc to C next visit- pt states he will\par F/U to [facility] in 2 weeks

## 2023-07-24 NOTE — PHYSICAL EXAM
[0] : left 0 [1+] : left 1+ [Ankle Swelling (On Exam)] : present [Ankle Swelling Bilaterally] : bilaterally  [Varicose Veins Of Lower Extremities] : bilaterally [Ankle Swelling On The Right] : mild [] : bilaterally [Purpura] : no purpura  [Petechiae] : no petechiae [Skin Ulcer] : ulcer [Skin Induration] : no induration [Alert] : alert [Oriented to Person] : oriented to person [Oriented to Place] : oriented to place [Oriented to Time] : oriented to time [Calm] : calm [de-identified] : A&Ox3, NAD [de-identified] : HTN, HLD [de-identified] : 5 out of 5 strength in all quadrants bilaterally [de-identified] : Left foot plantar first metatarsal phalangeal joint ulceration down to skin, subcutaneous tissue, fat [de-identified] : Loss of light and sensation bilaterally [FreeTextEntry1] : Left Plantar Foot 1st Met- callus- callus shaved by Dr Staton- very small superficial open area present underneath (measured below) [FreeTextEntry2] : 0.1 [FreeTextEntry3] : 0.1 [FreeTextEntry4] : 0.1 [de-identified] : Intact/ callus [de-identified] : Allevyn Ag [de-identified] : Cleansed with Normal saline\par  [TWNoteComboBox4] : None [de-identified] : None [de-identified] : None [de-identified] : 100% [de-identified] : No

## 2023-07-24 NOTE — PLAN
[FreeTextEntry1] : Continue off loading and wound care , repeat x rays r/o bony changes , no soi  Spent 20 minutes for patient care and medical decision making.\par

## 2023-07-26 DIAGNOSIS — Z79.899 OTHER LONG TERM (CURRENT) DRUG THERAPY: ICD-10-CM

## 2023-07-26 DIAGNOSIS — E78.5 HYPERLIPIDEMIA, UNSPECIFIED: ICD-10-CM

## 2023-07-26 DIAGNOSIS — Z79.890 HORMONE REPLACEMENT THERAPY: ICD-10-CM

## 2023-07-26 DIAGNOSIS — E11.40 TYPE 2 DIABETES MELLITUS WITH DIABETIC NEUROPATHY, UNSPECIFIED: ICD-10-CM

## 2023-07-26 DIAGNOSIS — Z79.82 LONG TERM (CURRENT) USE OF ASPIRIN: ICD-10-CM

## 2023-07-26 DIAGNOSIS — L97.422 NON-PRESSURE CHRONIC ULCER OF LEFT HEEL AND MIDFOOT WITH FAT LAYER EXPOSED: ICD-10-CM

## 2023-07-26 DIAGNOSIS — Z79.84 LONG TERM (CURRENT) USE OF ORAL HYPOGLYCEMIC DRUGS: ICD-10-CM

## 2023-07-26 DIAGNOSIS — L84 CORNS AND CALLOSITIES: ICD-10-CM

## 2023-07-26 DIAGNOSIS — E11.621 TYPE 2 DIABETES MELLITUS WITH FOOT ULCER: ICD-10-CM

## 2023-07-26 DIAGNOSIS — E55.9 VITAMIN D DEFICIENCY, UNSPECIFIED: ICD-10-CM

## 2023-07-26 DIAGNOSIS — I10 ESSENTIAL (PRIMARY) HYPERTENSION: ICD-10-CM

## 2023-07-26 DIAGNOSIS — E03.9 HYPOTHYROIDISM, UNSPECIFIED: ICD-10-CM

## 2023-08-07 ENCOUNTER — APPOINTMENT (OUTPATIENT)
Dept: WOUND CARE | Facility: HOSPITAL | Age: 73
End: 2023-08-07
Payer: COMMERCIAL

## 2023-08-07 ENCOUNTER — OUTPATIENT (OUTPATIENT)
Dept: OUTPATIENT SERVICES | Facility: HOSPITAL | Age: 73
LOS: 1 days | Discharge: HOME | End: 2023-08-07
Payer: COMMERCIAL

## 2023-08-07 VITALS
OXYGEN SATURATION: 98 % | BODY MASS INDEX: 25.05 KG/M2 | TEMPERATURE: 98.2 F | WEIGHT: 175 LBS | SYSTOLIC BLOOD PRESSURE: 178 MMHG | RESPIRATION RATE: 18 BRPM | DIASTOLIC BLOOD PRESSURE: 81 MMHG | HEIGHT: 70 IN | HEART RATE: 45 BPM

## 2023-08-07 VITALS — DIASTOLIC BLOOD PRESSURE: 77 MMHG | SYSTOLIC BLOOD PRESSURE: 155 MMHG

## 2023-08-07 DIAGNOSIS — E11.621 TYPE 2 DIABETES MELLITUS WITH FOOT ULCER: ICD-10-CM

## 2023-08-07 PROCEDURE — 99213 OFFICE O/P EST LOW 20 MIN: CPT

## 2023-08-07 PROCEDURE — G0463: CPT

## 2023-08-07 NOTE — ASSESSMENT
[Verbal] : Verbal [Written] : Written [Demo] : Demo [Patient] : Patient [Fair - mild discomfort, physical impairment, low acceptance] : Fair - mild discomfort, physical impairment, low acceptance [Needs reinforcement] : needs reinforcement [Dressing changes] : dressing changes [Foot Care] : foot care [Skin Care] : skin care [Signs and symptoms of infection] : sign and symptoms of infection [Nutrition] : nutrition [How and When to Call] : how and when to call [Labs and Tests] : labs and tests [Pain Management] : pain management [Off-loading] : off-loading [Glycemic Control] : glycemic control [Stable] : stable [Home] : Home [Ambulatory] : Ambulatory [Not Applicable - Long Term Care/Home Health Agency] : Long Term Care/Home Health Agency: Not Applicable [] : No [FreeTextEntry2] : Infection prevention Localized wound care  Goal remaining pain free regarding wounds Promote optimal nutrition/ low sodium, low glycemic  [FreeTextEntry4] : MRI and x -ray results reviewed by DPPORFIRIO with patient  Education provided of the dangerous of continues hypertension including risk of stroke. Pt expressed understanding and states he will contact his PPCP to discuss. DPM made aware.  Follow up in 2 weeks

## 2023-08-07 NOTE — HISTORY OF PRESENT ILLNESS
[FreeTextEntry1] : DFU plantar sub 1st left metatarsal head , currently closed , stable , callus , no open ulcers _ soi

## 2023-08-07 NOTE — REVIEW OF SYSTEMS
[Arthralgias] : arthralgias [Joint Stiffness] : joint stiffness [Skin Wound] : skin wound [Negative] : Heme/Lymph [Fever] : no fever [Chills] : no chills [Eye Pain] : no eye pain [Earache] : no earache [Shortness Of Breath] : no shortness of breath [Wheezing] : no wheezing [Abdominal Pain] : no abdominal pain [Anxiety] : no anxiety [FreeTextEntry5] : HTN, HLD [de-identified] : DFU 3 plantar left 1st metatarsal head , clean , , closed , callus - soi  [de-identified] : NIDDM with neuropathy  [de-identified] : NIDDM

## 2023-08-07 NOTE — PLAN
[FreeTextEntry1] : Continue observations , periodic debridement and orthotis consult for off loading orthotics , PTR 2 week  Spent 20 minutes for patient care and medical decision making   Patient was told if there are signs of infection ( fever, chills, nausea, vomiting ) or changes in the condition of the wound ( pain , cellulitis , purulent drainage or odor ) they should proceed to the ER as soon as possible

## 2023-08-07 NOTE — PHYSICAL EXAM
[0] : left 0 [1+] : left 1+ [Ankle Swelling (On Exam)] : present [Ankle Swelling Bilaterally] : bilaterally  [Varicose Veins Of Lower Extremities] : bilaterally [Ankle Swelling On The Right] : mild [Skin Ulcer] : ulcer [Alert] : alert [Oriented to Person] : oriented to person [Oriented to Place] : oriented to place [Oriented to Time] : oriented to time [Calm] : calm [] : not present [Purpura] : no purpura  [Petechiae] : no petechiae [Skin Induration] : no induration [de-identified] : A&Ox3, NAD [de-identified] : HTN, HLD [de-identified] : 5 out of 5 strength in all quadrants bilaterally [de-identified] : Left foot plantar first metatarsal head , closed , callus  [de-identified] : Loss of light and sensation bilaterally [FreeTextEntry1] : left plantar foot 1st met callus - no open wounds.  [de-identified] : callus  [de-identified] : allevyn pad  [de-identified] : Mechanically cleansed with sterile gauze and normal saline. cloth tape  [TWNoteComboBox4] : None [de-identified] : other [de-identified] : None [de-identified] : None [de-identified] : No [de-identified] : 3x Weekly [de-identified] : Secondary Dressing

## 2023-08-08 DIAGNOSIS — L97.422 NON-PRESSURE CHRONIC ULCER OF LEFT HEEL AND MIDFOOT WITH FAT LAYER EXPOSED: ICD-10-CM

## 2023-08-08 DIAGNOSIS — Z79.84 LONG TERM (CURRENT) USE OF ORAL HYPOGLYCEMIC DRUGS: ICD-10-CM

## 2023-08-08 DIAGNOSIS — E11.621 TYPE 2 DIABETES MELLITUS WITH FOOT ULCER: ICD-10-CM

## 2023-08-08 DIAGNOSIS — L84 CORNS AND CALLOSITIES: ICD-10-CM

## 2023-08-08 DIAGNOSIS — E03.9 HYPOTHYROIDISM, UNSPECIFIED: ICD-10-CM

## 2023-08-08 DIAGNOSIS — E55.9 VITAMIN D DEFICIENCY, UNSPECIFIED: ICD-10-CM

## 2023-08-08 DIAGNOSIS — I10 ESSENTIAL (PRIMARY) HYPERTENSION: ICD-10-CM

## 2023-08-08 DIAGNOSIS — E78.5 HYPERLIPIDEMIA, UNSPECIFIED: ICD-10-CM

## 2023-08-08 DIAGNOSIS — E11.40 TYPE 2 DIABETES MELLITUS WITH DIABETIC NEUROPATHY, UNSPECIFIED: ICD-10-CM

## 2023-08-08 DIAGNOSIS — Z79.899 OTHER LONG TERM (CURRENT) DRUG THERAPY: ICD-10-CM

## 2023-08-08 DIAGNOSIS — Z79.82 LONG TERM (CURRENT) USE OF ASPIRIN: ICD-10-CM

## 2023-08-08 DIAGNOSIS — Z79.890 HORMONE REPLACEMENT THERAPY: ICD-10-CM

## 2023-08-21 ENCOUNTER — OUTPATIENT (OUTPATIENT)
Dept: OUTPATIENT SERVICES | Facility: HOSPITAL | Age: 73
LOS: 1 days | Discharge: HOME | End: 2023-08-21
Payer: COMMERCIAL

## 2023-08-21 ENCOUNTER — APPOINTMENT (OUTPATIENT)
Dept: WOUND CARE | Facility: HOSPITAL | Age: 73
End: 2023-08-21
Payer: COMMERCIAL

## 2023-08-21 VITALS
HEART RATE: 53 BPM | DIASTOLIC BLOOD PRESSURE: 82 MMHG | SYSTOLIC BLOOD PRESSURE: 209 MMHG | TEMPERATURE: 97.8 F | RESPIRATION RATE: 19 BRPM | BODY MASS INDEX: 25.05 KG/M2 | HEIGHT: 70 IN | WEIGHT: 175 LBS

## 2023-08-21 DIAGNOSIS — E11.621 TYPE 2 DIABETES MELLITUS WITH FOOT ULCER: ICD-10-CM

## 2023-08-21 PROCEDURE — 99213 OFFICE O/P EST LOW 20 MIN: CPT

## 2023-08-21 PROCEDURE — G0463: CPT

## 2023-08-21 NOTE — REVIEW OF SYSTEMS
[Fever] : no fever [Chills] : no chills [Eye Pain] : no eye pain [Earache] : no earache [Shortness Of Breath] : no shortness of breath [Wheezing] : no wheezing [Abdominal Pain] : no abdominal pain [Arthralgias] : arthralgias [Joint Stiffness] : joint stiffness [Skin Wound] : skin wound [Anxiety] : no anxiety [Negative] : Heme/Lymph [FreeTextEntry5] : HTN, HLD [de-identified] : DFU 3 plantar left 1st metatarsal head , clean , , closed , callus - soi  [de-identified] : NIDDM with neuropathy  [de-identified] : NIDDM

## 2023-08-21 NOTE — PHYSICAL EXAM
[4 x 4] : 4 x 4  [Abdominal Pad] : Abdominal Pad [0] : left 0 [1+] : left 1+ [Ankle Swelling (On Exam)] : present [Ankle Swelling Bilaterally] : bilaterally  [Varicose Veins Of Lower Extremities] : bilaterally [Ankle Swelling On The Right] : mild [] : bilaterally [Purpura] : no purpura  [Petechiae] : no petechiae [Skin Ulcer] : ulcer [Skin Induration] : no induration [Alert] : alert [Oriented to Person] : oriented to person [Oriented to Place] : oriented to place [Oriented to Time] : oriented to time [Calm] : calm [de-identified] : A&Ox3, NAD [de-identified] : HTN, HLD [de-identified] : 5 out of 5 strength in all quadrants bilaterally [de-identified] : Left foot plantar first metatarsal head , closed , callus  [de-identified] : Loss of light and sensation bilaterally [FreeTextEntry1] : Plantar Foot Callus [de-identified] : None  [de-identified] : Cleansed Mechanically with 0.9^% Normal Saline and Allevyn  [TWNoteComboBox1] : Left [de-identified] : 2x Weekly [de-identified] : Primary Dressing

## 2023-08-21 NOTE — PLAN
[FreeTextEntry1] : Continue observation , maintain skin integrity with bag balm ointment. Spent 20 minutes for patient care and medical decision making.PTR 2 weeks

## 2023-08-21 NOTE — ASSESSMENT
[Verbal] : Verbal [Patient] : Patient [Good - alert, interested, motivated] : Good - alert, interested, motivated [Demonstrates independently] : demonstrates independently [Dressing changes] : dressing changes [Foot Care] : foot care [Skin Care] : skin care [Pressure relief] : pressure relief [Signs and symptoms of infection] : sign and symptoms of infection [Nutrition] : nutrition [How and When to Call] : how and when to call [Pain Management] : pain management [Off-loading] : off-loading [Compression Therapy] : compression therapy [Patient responsibility to plan of care] : patient responsibility to plan of care [Stable] : stable [Home] : Home [Ambulatory] : Ambulatory [Not Applicable - Long Term Care/Home Health Agency] : Long Term Care/Home Health Agency: Not Applicable [] : No [FreeTextEntry2] : Infection Control  Skin Integrity  Wound Care  Pain control  Nutrition   [FreeTextEntry4] : Pt has callus no open wound. Pt able to ambulate without assistance and denies pain and discomfort. Pt's /82 - pt denies headache, blurry vision and dizziness - pt advised to go to the ER by MD Staton - pt also advised to follow up with his primary physician. Pt denies wanting to go to the ER and prefers to make his appointment with his doctor. Pt made aware of the risk of hypertension and told to go to the ER if any issues arise. Pt to return here in 2 weeks.

## 2023-08-22 DIAGNOSIS — Z79.82 LONG TERM (CURRENT) USE OF ASPIRIN: ICD-10-CM

## 2023-08-22 DIAGNOSIS — Z79.899 OTHER LONG TERM (CURRENT) DRUG THERAPY: ICD-10-CM

## 2023-08-22 DIAGNOSIS — I10 ESSENTIAL (PRIMARY) HYPERTENSION: ICD-10-CM

## 2023-08-22 DIAGNOSIS — E11.40 TYPE 2 DIABETES MELLITUS WITH DIABETIC NEUROPATHY, UNSPECIFIED: ICD-10-CM

## 2023-08-22 DIAGNOSIS — L97.422 NON-PRESSURE CHRONIC ULCER OF LEFT HEEL AND MIDFOOT WITH FAT LAYER EXPOSED: ICD-10-CM

## 2023-08-22 DIAGNOSIS — E78.5 HYPERLIPIDEMIA, UNSPECIFIED: ICD-10-CM

## 2023-08-22 DIAGNOSIS — Z79.890 HORMONE REPLACEMENT THERAPY: ICD-10-CM

## 2023-08-22 DIAGNOSIS — L84 CORNS AND CALLOSITIES: ICD-10-CM

## 2023-08-22 DIAGNOSIS — Z79.84 LONG TERM (CURRENT) USE OF ORAL HYPOGLYCEMIC DRUGS: ICD-10-CM

## 2023-08-22 DIAGNOSIS — E55.9 VITAMIN D DEFICIENCY, UNSPECIFIED: ICD-10-CM

## 2023-08-22 DIAGNOSIS — E11.621 TYPE 2 DIABETES MELLITUS WITH FOOT ULCER: ICD-10-CM

## 2023-08-22 DIAGNOSIS — E03.9 HYPOTHYROIDISM, UNSPECIFIED: ICD-10-CM

## 2023-09-05 ENCOUNTER — APPOINTMENT (OUTPATIENT)
Dept: WOUND CARE | Facility: HOSPITAL | Age: 73
End: 2023-09-05
Payer: COMMERCIAL

## 2023-09-05 ENCOUNTER — OUTPATIENT (OUTPATIENT)
Dept: OUTPATIENT SERVICES | Facility: HOSPITAL | Age: 73
LOS: 1 days | Discharge: HOME | End: 2023-09-05
Payer: COMMERCIAL

## 2023-09-05 DIAGNOSIS — E11.621 TYPE 2 DIABETES MELLITUS WITH FOOT ULCER: ICD-10-CM

## 2023-09-05 DIAGNOSIS — Z79.84 LONG TERM (CURRENT) USE OF ORAL HYPOGLYCEMIC DRUGS: ICD-10-CM

## 2023-09-05 DIAGNOSIS — Z79.899 OTHER LONG TERM (CURRENT) DRUG THERAPY: ICD-10-CM

## 2023-09-05 DIAGNOSIS — Z79.82 LONG TERM (CURRENT) USE OF ASPIRIN: ICD-10-CM

## 2023-09-05 DIAGNOSIS — E55.9 VITAMIN D DEFICIENCY, UNSPECIFIED: ICD-10-CM

## 2023-09-05 DIAGNOSIS — L97.422 NON-PRESSURE CHRONIC ULCER OF LEFT HEEL AND MIDFOOT WITH FAT LAYER EXPOSED: ICD-10-CM

## 2023-09-05 DIAGNOSIS — L84 CORNS AND CALLOSITIES: ICD-10-CM

## 2023-09-05 DIAGNOSIS — E78.5 HYPERLIPIDEMIA, UNSPECIFIED: ICD-10-CM

## 2023-09-05 DIAGNOSIS — I10 ESSENTIAL (PRIMARY) HYPERTENSION: ICD-10-CM

## 2023-09-05 DIAGNOSIS — E11.40 TYPE 2 DIABETES MELLITUS WITH DIABETIC NEUROPATHY, UNSPECIFIED: ICD-10-CM

## 2023-09-05 DIAGNOSIS — Z79.890 HORMONE REPLACEMENT THERAPY: ICD-10-CM

## 2023-09-05 DIAGNOSIS — E03.9 HYPOTHYROIDISM, UNSPECIFIED: ICD-10-CM

## 2023-09-05 PROCEDURE — G0463: CPT

## 2023-09-05 PROCEDURE — 99213 OFFICE O/P EST LOW 20 MIN: CPT

## 2023-09-05 NOTE — PHYSICAL EXAM
[0] : left 0 [1+] : left 1+ [Ankle Swelling (On Exam)] : present [Ankle Swelling Bilaterally] : bilaterally  [Varicose Veins Of Lower Extremities] : bilaterally [Ankle Swelling On The Right] : mild [] : bilaterally [Stool Sample Taken] : No stool obtained  on rectal exam [Purpura] : no purpura  [Petechiae] : no petechiae [Skin Ulcer] : ulcer [Skin Induration] : no induration [Alert] : alert [Oriented to Person] : oriented to person [Oriented to Place] : oriented to place [Oriented to Time] : oriented to time [Calm] : calm [de-identified] : A&Ox3, NAD [de-identified] : HTN, HLD [de-identified] : 5 out of 5 strength in all quadrants bilaterally [de-identified] : Left foot plantar first metatarsal head , closed , callus  [de-identified] : Loss of light and sensation bilaterally [FreeTextEntry1] : Left Plantar Foot Callus-closed [de-identified] : Allevyn Pad, Paper tape [de-identified] : Mechanically cleansed with sterile gauze and normal saline 0.9% Dry Dressing [de-identified] : 3x Weekly [de-identified] : Primary Dressing

## 2023-09-05 NOTE — HISTORY OF PRESENT ILLNESS
[FreeTextEntry1] : Plantar callus and superficial ulcer , 1st metatarsal head left foot , currently closed callus with no soi

## 2023-09-05 NOTE — ASSESSMENT
[Verbal] : Verbal [Demo] : Demo [Patient] : Patient [Good - alert, interested, motivated] : Good - alert, interested, motivated [Verbalizes knowledge/Understanding] : Verbalizes knowledge/understanding [Skin Care] : skin care [Signs and symptoms of infection] : sign and symptoms of infection [How and When to Call] : how and when to call [Pain Management] : pain management [Patient responsibility to plan of care] : patient responsibility to plan of care [Stable] : stable [Home] : Home [Ambulatory] : Ambulatory [Not Applicable - Long Term Care/Home Health Agency] : Long Term Care/Home Health Agency: Not Applicable [] : Yes [FreeTextEntry2] : Infection prevention Pressure relief Wound care  Maintain optimal skin integrity to high pressure areas  [FreeTextEntry4] : MD shaved down calloused area, requesting an orthotics consult for inserts in his shoes. F/U in 3 weeks

## 2023-09-05 NOTE — REVIEW OF SYSTEMS
[Fever] : no fever [Chills] : no chills [Eye Pain] : no eye pain [Earache] : no earache [Shortness Of Breath] : no shortness of breath [Wheezing] : no wheezing [Abdominal Pain] : no abdominal pain [Arthralgias] : arthralgias [Joint Stiffness] : joint stiffness [Skin Wound] : skin wound [Anxiety] : no anxiety [Negative] : Heme/Lymph [FreeTextEntry5] : HTN, HLD [de-identified] : DFU 3 plantar left 1st metatarsal head , clean , , closed , callus - soi  [de-identified] : NIDDM with neuropathy  [de-identified] : NIDDM

## 2023-09-05 NOTE — PLAN
[FreeTextEntry1] : # 15 blade used to trim callus , slightly wet underneath the callus with no soi , patient to see orthotist for off loading orthotics  PTR 2 weeks   Patient was told if there are signs of infection ( fever, chills, nausea, vomiting ) or changes in the condition of the wound ( pain , cellulitis , purulent drainage or odor ) they should proceed to the ER as soon as possible  Spent 20 minutes for patient care and medical decision making.

## 2023-09-26 ENCOUNTER — OUTPATIENT (OUTPATIENT)
Dept: OUTPATIENT SERVICES | Facility: HOSPITAL | Age: 73
LOS: 1 days | Discharge: HOME | End: 2023-09-26
Payer: COMMERCIAL

## 2023-09-26 ENCOUNTER — APPOINTMENT (OUTPATIENT)
Dept: WOUND CARE | Facility: HOSPITAL | Age: 73
End: 2023-09-26
Payer: COMMERCIAL

## 2023-09-26 VITALS — HEART RATE: 53 BPM | DIASTOLIC BLOOD PRESSURE: 65 MMHG | SYSTOLIC BLOOD PRESSURE: 219 MMHG

## 2023-09-26 VITALS
HEIGHT: 70 IN | WEIGHT: 175 LBS | HEART RATE: 46 BPM | RESPIRATION RATE: 18 BRPM | DIASTOLIC BLOOD PRESSURE: 84 MMHG | SYSTOLIC BLOOD PRESSURE: 197 MMHG | OXYGEN SATURATION: 97 % | BODY MASS INDEX: 25.05 KG/M2 | TEMPERATURE: 97.8 F

## 2023-09-26 VITALS — DIASTOLIC BLOOD PRESSURE: 80 MMHG | SYSTOLIC BLOOD PRESSURE: 210 MMHG

## 2023-09-26 DIAGNOSIS — E11.40 TYPE 2 DIABETES MELLITUS WITH DIABETIC NEUROPATHY, UNSPECIFIED: ICD-10-CM

## 2023-09-26 DIAGNOSIS — E55.9 VITAMIN D DEFICIENCY, UNSPECIFIED: ICD-10-CM

## 2023-09-26 DIAGNOSIS — E78.5 HYPERLIPIDEMIA, UNSPECIFIED: ICD-10-CM

## 2023-09-26 DIAGNOSIS — L84 CORNS AND CALLOSITIES: ICD-10-CM

## 2023-09-26 DIAGNOSIS — Z79.84 LONG TERM (CURRENT) USE OF ORAL HYPOGLYCEMIC DRUGS: ICD-10-CM

## 2023-09-26 DIAGNOSIS — Z79.899 OTHER LONG TERM (CURRENT) DRUG THERAPY: ICD-10-CM

## 2023-09-26 DIAGNOSIS — L97.422 NON-PRESSURE CHRONIC ULCER OF LEFT HEEL AND MIDFOOT WITH FAT LAYER EXPOSED: ICD-10-CM

## 2023-09-26 DIAGNOSIS — E03.9 HYPOTHYROIDISM, UNSPECIFIED: ICD-10-CM

## 2023-09-26 DIAGNOSIS — Z79.82 LONG TERM (CURRENT) USE OF ASPIRIN: ICD-10-CM

## 2023-09-26 DIAGNOSIS — E11.621 TYPE 2 DIABETES MELLITUS WITH FOOT ULCER: ICD-10-CM

## 2023-09-26 DIAGNOSIS — I10 ESSENTIAL (PRIMARY) HYPERTENSION: ICD-10-CM

## 2023-09-26 DIAGNOSIS — Z79.890 HORMONE REPLACEMENT THERAPY: ICD-10-CM

## 2023-09-26 PROCEDURE — 99213 OFFICE O/P EST LOW 20 MIN: CPT

## 2023-09-26 PROCEDURE — 87070 CULTURE OTHR SPECIMN AEROBIC: CPT

## 2023-09-26 PROCEDURE — G0463: CPT

## 2023-09-26 PROCEDURE — 87186 SC STD MICRODIL/AGAR DIL: CPT

## 2023-09-26 RX ORDER — MUPIROCIN 20 MG/G
2 OINTMENT TOPICAL TWICE DAILY
Qty: 1 | Refills: 5 | Status: ACTIVE | COMMUNITY
Start: 2023-09-26 | End: 1900-01-01

## 2023-09-28 LAB
-  AMPICILLIN/SULBACTAM: SIGNIFICANT CHANGE UP
-  CEFAZOLIN: SIGNIFICANT CHANGE UP
-  CLINDAMYCIN: SIGNIFICANT CHANGE UP
-  ERYTHROMYCIN: SIGNIFICANT CHANGE UP
-  GENTAMICIN: SIGNIFICANT CHANGE UP
-  OXACILLIN: SIGNIFICANT CHANGE UP
-  PENICILLIN: SIGNIFICANT CHANGE UP
-  RIFAMPIN: SIGNIFICANT CHANGE UP
-  TETRACYCLINE: SIGNIFICANT CHANGE UP
-  TRIMETHOPRIM/SULFAMETHOXAZOLE: SIGNIFICANT CHANGE UP
-  VANCOMYCIN: SIGNIFICANT CHANGE UP
CULTURE RESULTS: SIGNIFICANT CHANGE UP
METHOD TYPE: SIGNIFICANT CHANGE UP
ORGANISM # SPEC MICROSCOPIC CNT: SIGNIFICANT CHANGE UP
ORGANISM # SPEC MICROSCOPIC CNT: SIGNIFICANT CHANGE UP
SPECIMEN SOURCE: SIGNIFICANT CHANGE UP

## 2023-10-09 ENCOUNTER — NON-APPOINTMENT (OUTPATIENT)
Age: 73
End: 2023-10-09

## 2023-10-09 ENCOUNTER — OUTPATIENT (OUTPATIENT)
Dept: OUTPATIENT SERVICES | Facility: HOSPITAL | Age: 73
LOS: 1 days | End: 2023-10-09
Payer: COMMERCIAL

## 2023-10-09 DIAGNOSIS — L97.422 NON-PRESSURE CHRONIC ULCER OF LEFT HEEL AND MIDFOOT WITH FAT LAYER EXPOSED: ICD-10-CM

## 2023-10-09 PROCEDURE — A9579: CPT

## 2023-10-09 PROCEDURE — 73720 MRI LWR EXTREMITY W/O&W/DYE: CPT | Mod: 26,LT

## 2023-10-09 PROCEDURE — 73720 MRI LWR EXTREMITY W/O&W/DYE: CPT

## 2023-10-10 ENCOUNTER — OUTPATIENT (OUTPATIENT)
Dept: OUTPATIENT SERVICES | Facility: HOSPITAL | Age: 73
LOS: 1 days | Discharge: HOME | End: 2023-10-10
Payer: COMMERCIAL

## 2023-10-10 ENCOUNTER — APPOINTMENT (OUTPATIENT)
Dept: WOUND CARE | Facility: HOSPITAL | Age: 73
End: 2023-10-10
Payer: COMMERCIAL

## 2023-10-10 VITALS
HEIGHT: 70 IN | OXYGEN SATURATION: 99 % | WEIGHT: 175 LBS | HEART RATE: 50 BPM | BODY MASS INDEX: 25.05 KG/M2 | DIASTOLIC BLOOD PRESSURE: 77 MMHG | TEMPERATURE: 97.7 F | RESPIRATION RATE: 18 BRPM | SYSTOLIC BLOOD PRESSURE: 181 MMHG

## 2023-10-10 DIAGNOSIS — E11.621 TYPE 2 DIABETES MELLITUS WITH FOOT ULCER: ICD-10-CM

## 2023-10-10 PROCEDURE — G0463: CPT

## 2023-10-10 PROCEDURE — 99213 OFFICE O/P EST LOW 20 MIN: CPT

## 2023-10-12 DIAGNOSIS — I10 ESSENTIAL (PRIMARY) HYPERTENSION: ICD-10-CM

## 2023-10-12 DIAGNOSIS — Z79.84 LONG TERM (CURRENT) USE OF ORAL HYPOGLYCEMIC DRUGS: ICD-10-CM

## 2023-10-12 DIAGNOSIS — Z79.82 LONG TERM (CURRENT) USE OF ASPIRIN: ICD-10-CM

## 2023-10-12 DIAGNOSIS — L97.422 NON-PRESSURE CHRONIC ULCER OF LEFT HEEL AND MIDFOOT WITH FAT LAYER EXPOSED: ICD-10-CM

## 2023-10-12 DIAGNOSIS — Z79.899 OTHER LONG TERM (CURRENT) DRUG THERAPY: ICD-10-CM

## 2023-10-12 DIAGNOSIS — E55.9 VITAMIN D DEFICIENCY, UNSPECIFIED: ICD-10-CM

## 2023-10-12 DIAGNOSIS — E78.5 HYPERLIPIDEMIA, UNSPECIFIED: ICD-10-CM

## 2023-10-12 DIAGNOSIS — E11.40 TYPE 2 DIABETES MELLITUS WITH DIABETIC NEUROPATHY, UNSPECIFIED: ICD-10-CM

## 2023-10-12 DIAGNOSIS — E03.9 HYPOTHYROIDISM, UNSPECIFIED: ICD-10-CM

## 2023-10-12 DIAGNOSIS — Z79.890 HORMONE REPLACEMENT THERAPY: ICD-10-CM

## 2023-10-12 DIAGNOSIS — E11.621 TYPE 2 DIABETES MELLITUS WITH FOOT ULCER: ICD-10-CM

## 2023-10-12 DIAGNOSIS — L84 CORNS AND CALLOSITIES: ICD-10-CM

## 2023-10-17 ENCOUNTER — OUTPATIENT (OUTPATIENT)
Dept: OUTPATIENT SERVICES | Facility: HOSPITAL | Age: 73
LOS: 1 days | Discharge: HOME | End: 2023-10-17
Payer: COMMERCIAL

## 2023-10-17 ENCOUNTER — APPOINTMENT (OUTPATIENT)
Dept: WOUND CARE | Facility: HOSPITAL | Age: 73
End: 2023-10-17
Payer: COMMERCIAL

## 2023-10-17 VITALS
DIASTOLIC BLOOD PRESSURE: 75 MMHG | WEIGHT: 175.31 LBS | TEMPERATURE: 97.6 F | HEART RATE: 50 BPM | SYSTOLIC BLOOD PRESSURE: 200 MMHG | HEIGHT: 70 IN | BODY MASS INDEX: 25.1 KG/M2 | OXYGEN SATURATION: 98 % | RESPIRATION RATE: 16 BRPM

## 2023-10-17 DIAGNOSIS — E11.621 TYPE 2 DIABETES MELLITUS WITH FOOT ULCER: ICD-10-CM

## 2023-10-17 PROCEDURE — G0463: CPT

## 2023-10-17 PROCEDURE — 99213 OFFICE O/P EST LOW 20 MIN: CPT

## 2023-10-18 DIAGNOSIS — Z79.84 LONG TERM (CURRENT) USE OF ORAL HYPOGLYCEMIC DRUGS: ICD-10-CM

## 2023-10-18 DIAGNOSIS — E55.9 VITAMIN D DEFICIENCY, UNSPECIFIED: ICD-10-CM

## 2023-10-18 DIAGNOSIS — Z79.82 LONG TERM (CURRENT) USE OF ASPIRIN: ICD-10-CM

## 2023-10-18 DIAGNOSIS — I10 ESSENTIAL (PRIMARY) HYPERTENSION: ICD-10-CM

## 2023-10-18 DIAGNOSIS — E11.621 TYPE 2 DIABETES MELLITUS WITH FOOT ULCER: ICD-10-CM

## 2023-10-18 DIAGNOSIS — E03.9 HYPOTHYROIDISM, UNSPECIFIED: ICD-10-CM

## 2023-10-18 DIAGNOSIS — L84 CORNS AND CALLOSITIES: ICD-10-CM

## 2023-10-18 DIAGNOSIS — E78.5 HYPERLIPIDEMIA, UNSPECIFIED: ICD-10-CM

## 2023-10-18 DIAGNOSIS — L97.422 NON-PRESSURE CHRONIC ULCER OF LEFT HEEL AND MIDFOOT WITH FAT LAYER EXPOSED: ICD-10-CM

## 2023-10-18 DIAGNOSIS — E11.40 TYPE 2 DIABETES MELLITUS WITH DIABETIC NEUROPATHY, UNSPECIFIED: ICD-10-CM

## 2023-10-18 DIAGNOSIS — Z79.899 OTHER LONG TERM (CURRENT) DRUG THERAPY: ICD-10-CM

## 2023-10-18 DIAGNOSIS — Z79.890 HORMONE REPLACEMENT THERAPY: ICD-10-CM

## 2023-12-04 ENCOUNTER — OUTPATIENT (OUTPATIENT)
Dept: OUTPATIENT SERVICES | Facility: HOSPITAL | Age: 73
LOS: 1 days | Discharge: ANOTHER HOSPITAL | End: 2023-12-04

## 2023-12-04 ENCOUNTER — TRANSCRIPTION ENCOUNTER (OUTPATIENT)
Age: 73
End: 2023-12-04

## 2023-12-04 ENCOUNTER — APPOINTMENT (OUTPATIENT)
Dept: WOUND CARE | Facility: HOSPITAL | Age: 73
End: 2023-12-04
Payer: COMMERCIAL

## 2023-12-04 ENCOUNTER — RESULT REVIEW (OUTPATIENT)
Age: 73
End: 2023-12-04

## 2023-12-04 ENCOUNTER — INPATIENT (INPATIENT)
Facility: HOSPITAL | Age: 73
LOS: 3 days | Discharge: HOME | DRG: 617 | End: 2023-12-08
Attending: FAMILY MEDICINE | Admitting: INTERNAL MEDICINE
Payer: COMMERCIAL

## 2023-12-04 VITALS
TEMPERATURE: 98 F | HEART RATE: 54 BPM | SYSTOLIC BLOOD PRESSURE: 192 MMHG | HEIGHT: 71 IN | RESPIRATION RATE: 18 BRPM | WEIGHT: 175.05 LBS | DIASTOLIC BLOOD PRESSURE: 77 MMHG | OXYGEN SATURATION: 99 %

## 2023-12-04 VITALS
SYSTOLIC BLOOD PRESSURE: 193 MMHG | OXYGEN SATURATION: 99 % | WEIGHT: 175 LBS | DIASTOLIC BLOOD PRESSURE: 74 MMHG | HEART RATE: 59 BPM | RESPIRATION RATE: 18 BRPM | TEMPERATURE: 98.1 F | HEIGHT: 70 IN | BODY MASS INDEX: 25.05 KG/M2

## 2023-12-04 DIAGNOSIS — E11.621 TYPE 2 DIABETES MELLITUS WITH FOOT ULCER: ICD-10-CM

## 2023-12-04 DIAGNOSIS — S91.302A UNSPECIFIED OPEN WOUND, LEFT FOOT, INITIAL ENCOUNTER: ICD-10-CM

## 2023-12-04 DIAGNOSIS — S91.309A UNSPECIFIED OPEN WOUND, UNSPECIFIED FOOT, INITIAL ENCOUNTER: ICD-10-CM

## 2023-12-04 DIAGNOSIS — M86.9 OSTEOMYELITIS, UNSPECIFIED: ICD-10-CM

## 2023-12-04 DIAGNOSIS — R00.1 BRADYCARDIA, UNSPECIFIED: ICD-10-CM

## 2023-12-04 DIAGNOSIS — Z29.9 ENCOUNTER FOR PROPHYLACTIC MEASURES, UNSPECIFIED: ICD-10-CM

## 2023-12-04 DIAGNOSIS — E78.5 HYPERLIPIDEMIA, UNSPECIFIED: ICD-10-CM

## 2023-12-04 DIAGNOSIS — E03.9 HYPOTHYROIDISM, UNSPECIFIED: ICD-10-CM

## 2023-12-04 DIAGNOSIS — I10 ESSENTIAL (PRIMARY) HYPERTENSION: ICD-10-CM

## 2023-12-04 DIAGNOSIS — E11.9 TYPE 2 DIABETES MELLITUS WITHOUT COMPLICATIONS: ICD-10-CM

## 2023-12-04 DIAGNOSIS — R22.42 LOCALIZED SWELLING, MASS AND LUMP, LEFT LOWER LIMB: ICD-10-CM

## 2023-12-04 LAB
A1C WITH ESTIMATED AVERAGE GLUCOSE RESULT: 9.5 % — HIGH (ref 4–5.6)
A1C WITH ESTIMATED AVERAGE GLUCOSE RESULT: 9.5 % — HIGH (ref 4–5.6)
ALBUMIN SERPL ELPH-MCNC: 3.6 G/DL — SIGNIFICANT CHANGE UP (ref 3.3–5)
ALBUMIN SERPL ELPH-MCNC: 3.6 G/DL — SIGNIFICANT CHANGE UP (ref 3.3–5)
ALP SERPL-CCNC: 132 U/L — HIGH (ref 40–120)
ALP SERPL-CCNC: 132 U/L — HIGH (ref 40–120)
ALT FLD-CCNC: 21 U/L — SIGNIFICANT CHANGE UP (ref 12–78)
ALT FLD-CCNC: 21 U/L — SIGNIFICANT CHANGE UP (ref 12–78)
ANION GAP SERPL CALC-SCNC: 12 MMOL/L — SIGNIFICANT CHANGE UP (ref 5–17)
ANION GAP SERPL CALC-SCNC: 12 MMOL/L — SIGNIFICANT CHANGE UP (ref 5–17)
AST SERPL-CCNC: 19 U/L — SIGNIFICANT CHANGE UP (ref 15–37)
AST SERPL-CCNC: 19 U/L — SIGNIFICANT CHANGE UP (ref 15–37)
BASOPHILS # BLD AUTO: 0.09 K/UL — SIGNIFICANT CHANGE UP (ref 0–0.2)
BASOPHILS # BLD AUTO: 0.09 K/UL — SIGNIFICANT CHANGE UP (ref 0–0.2)
BASOPHILS NFR BLD AUTO: 0.8 % — SIGNIFICANT CHANGE UP (ref 0–2)
BASOPHILS NFR BLD AUTO: 0.8 % — SIGNIFICANT CHANGE UP (ref 0–2)
BILIRUB SERPL-MCNC: 0.7 MG/DL — SIGNIFICANT CHANGE UP (ref 0.2–1.2)
BILIRUB SERPL-MCNC: 0.7 MG/DL — SIGNIFICANT CHANGE UP (ref 0.2–1.2)
BUN SERPL-MCNC: 13 MG/DL — SIGNIFICANT CHANGE UP (ref 7–23)
BUN SERPL-MCNC: 13 MG/DL — SIGNIFICANT CHANGE UP (ref 7–23)
CALCIUM SERPL-MCNC: 9.5 MG/DL — SIGNIFICANT CHANGE UP (ref 8.5–10.1)
CALCIUM SERPL-MCNC: 9.5 MG/DL — SIGNIFICANT CHANGE UP (ref 8.5–10.1)
CHLORIDE SERPL-SCNC: 98 MMOL/L — SIGNIFICANT CHANGE UP (ref 96–108)
CHLORIDE SERPL-SCNC: 98 MMOL/L — SIGNIFICANT CHANGE UP (ref 96–108)
CO2 SERPL-SCNC: 26 MMOL/L — SIGNIFICANT CHANGE UP (ref 22–31)
CO2 SERPL-SCNC: 26 MMOL/L — SIGNIFICANT CHANGE UP (ref 22–31)
CREAT SERPL-MCNC: 1.2 MG/DL — SIGNIFICANT CHANGE UP (ref 0.5–1.3)
CREAT SERPL-MCNC: 1.2 MG/DL — SIGNIFICANT CHANGE UP (ref 0.5–1.3)
CRP SERPL-MCNC: 38 MG/L — HIGH
CRP SERPL-MCNC: 38 MG/L — HIGH
EGFR: 63 ML/MIN/1.73M2 — SIGNIFICANT CHANGE UP
EGFR: 63 ML/MIN/1.73M2 — SIGNIFICANT CHANGE UP
EOSINOPHIL # BLD AUTO: 0.31 K/UL — SIGNIFICANT CHANGE UP (ref 0–0.5)
EOSINOPHIL # BLD AUTO: 0.31 K/UL — SIGNIFICANT CHANGE UP (ref 0–0.5)
EOSINOPHIL NFR BLD AUTO: 2.6 % — SIGNIFICANT CHANGE UP (ref 0–6)
EOSINOPHIL NFR BLD AUTO: 2.6 % — SIGNIFICANT CHANGE UP (ref 0–6)
ERYTHROCYTE [SEDIMENTATION RATE] IN BLOOD: 74 MM/HR — HIGH (ref 0–20)
ERYTHROCYTE [SEDIMENTATION RATE] IN BLOOD: 74 MM/HR — HIGH (ref 0–20)
ESTIMATED AVERAGE GLUCOSE: 226 MG/DL — HIGH (ref 68–114)
ESTIMATED AVERAGE GLUCOSE: 226 MG/DL — HIGH (ref 68–114)
GLUCOSE SERPL-MCNC: 192 MG/DL — HIGH (ref 70–99)
GLUCOSE SERPL-MCNC: 192 MG/DL — HIGH (ref 70–99)
HCT VFR BLD CALC: 41.4 % — SIGNIFICANT CHANGE UP (ref 39–50)
HCT VFR BLD CALC: 41.4 % — SIGNIFICANT CHANGE UP (ref 39–50)
HGB BLD-MCNC: 14.2 G/DL — SIGNIFICANT CHANGE UP (ref 13–17)
HGB BLD-MCNC: 14.2 G/DL — SIGNIFICANT CHANGE UP (ref 13–17)
IMM GRANULOCYTES NFR BLD AUTO: 0.7 % — SIGNIFICANT CHANGE UP (ref 0–0.9)
IMM GRANULOCYTES NFR BLD AUTO: 0.7 % — SIGNIFICANT CHANGE UP (ref 0–0.9)
LYMPHOCYTES # BLD AUTO: 2.39 K/UL — SIGNIFICANT CHANGE UP (ref 1–3.3)
LYMPHOCYTES # BLD AUTO: 2.39 K/UL — SIGNIFICANT CHANGE UP (ref 1–3.3)
LYMPHOCYTES # BLD AUTO: 20 % — SIGNIFICANT CHANGE UP (ref 13–44)
LYMPHOCYTES # BLD AUTO: 20 % — SIGNIFICANT CHANGE UP (ref 13–44)
MCHC RBC-ENTMCNC: 27.6 PG — SIGNIFICANT CHANGE UP (ref 27–34)
MCHC RBC-ENTMCNC: 27.6 PG — SIGNIFICANT CHANGE UP (ref 27–34)
MCHC RBC-ENTMCNC: 34.3 GM/DL — SIGNIFICANT CHANGE UP (ref 32–36)
MCHC RBC-ENTMCNC: 34.3 GM/DL — SIGNIFICANT CHANGE UP (ref 32–36)
MCV RBC AUTO: 80.4 FL — SIGNIFICANT CHANGE UP (ref 80–100)
MCV RBC AUTO: 80.4 FL — SIGNIFICANT CHANGE UP (ref 80–100)
MONOCYTES # BLD AUTO: 1.03 K/UL — HIGH (ref 0–0.9)
MONOCYTES # BLD AUTO: 1.03 K/UL — HIGH (ref 0–0.9)
MONOCYTES NFR BLD AUTO: 8.6 % — SIGNIFICANT CHANGE UP (ref 2–14)
MONOCYTES NFR BLD AUTO: 8.6 % — SIGNIFICANT CHANGE UP (ref 2–14)
NEUTROPHILS # BLD AUTO: 8.06 K/UL — HIGH (ref 1.8–7.4)
NEUTROPHILS # BLD AUTO: 8.06 K/UL — HIGH (ref 1.8–7.4)
NEUTROPHILS NFR BLD AUTO: 67.3 % — SIGNIFICANT CHANGE UP (ref 43–77)
NEUTROPHILS NFR BLD AUTO: 67.3 % — SIGNIFICANT CHANGE UP (ref 43–77)
NRBC # BLD: 0 /100 WBCS — SIGNIFICANT CHANGE UP (ref 0–0)
NRBC # BLD: 0 /100 WBCS — SIGNIFICANT CHANGE UP (ref 0–0)
PLATELET # BLD AUTO: 312 K/UL — SIGNIFICANT CHANGE UP (ref 150–400)
PLATELET # BLD AUTO: 312 K/UL — SIGNIFICANT CHANGE UP (ref 150–400)
POTASSIUM SERPL-MCNC: 4.1 MMOL/L — SIGNIFICANT CHANGE UP (ref 3.5–5.3)
POTASSIUM SERPL-MCNC: 4.1 MMOL/L — SIGNIFICANT CHANGE UP (ref 3.5–5.3)
POTASSIUM SERPL-SCNC: 4.1 MMOL/L — SIGNIFICANT CHANGE UP (ref 3.5–5.3)
POTASSIUM SERPL-SCNC: 4.1 MMOL/L — SIGNIFICANT CHANGE UP (ref 3.5–5.3)
PROT SERPL-MCNC: 8.5 G/DL — HIGH (ref 6–8.3)
PROT SERPL-MCNC: 8.5 G/DL — HIGH (ref 6–8.3)
RBC # BLD: 5.15 M/UL — SIGNIFICANT CHANGE UP (ref 4.2–5.8)
RBC # BLD: 5.15 M/UL — SIGNIFICANT CHANGE UP (ref 4.2–5.8)
RBC # FLD: 12.5 % — SIGNIFICANT CHANGE UP (ref 10.3–14.5)
RBC # FLD: 12.5 % — SIGNIFICANT CHANGE UP (ref 10.3–14.5)
SODIUM SERPL-SCNC: 136 MMOL/L — SIGNIFICANT CHANGE UP (ref 135–145)
SODIUM SERPL-SCNC: 136 MMOL/L — SIGNIFICANT CHANGE UP (ref 135–145)
WBC # BLD: 11.96 K/UL — HIGH (ref 3.8–10.5)
WBC # BLD: 11.96 K/UL — HIGH (ref 3.8–10.5)
WBC # FLD AUTO: 11.96 K/UL — HIGH (ref 3.8–10.5)
WBC # FLD AUTO: 11.96 K/UL — HIGH (ref 3.8–10.5)

## 2023-12-04 PROCEDURE — 99214 OFFICE O/P EST MOD 30 MIN: CPT | Mod: 25

## 2023-12-04 PROCEDURE — 93010 ELECTROCARDIOGRAM REPORT: CPT

## 2023-12-04 PROCEDURE — 99223 1ST HOSP IP/OBS HIGH 75: CPT | Mod: GC

## 2023-12-04 PROCEDURE — 71046 X-RAY EXAM CHEST 2 VIEWS: CPT | Mod: 26

## 2023-12-04 PROCEDURE — 11044 DBRDMT BONE 1ST 20 SQ CM/<: CPT

## 2023-12-04 PROCEDURE — 88304 TISSUE EXAM BY PATHOLOGIST: CPT | Mod: 26

## 2023-12-04 PROCEDURE — 99253 IP/OBS CNSLTJ NEW/EST LOW 45: CPT | Mod: 57

## 2023-12-04 PROCEDURE — 99252 IP/OBS CONSLTJ NEW/EST SF 35: CPT

## 2023-12-04 PROCEDURE — 73630 X-RAY EXAM OF FOOT: CPT | Mod: 26,50

## 2023-12-04 PROCEDURE — 99285 EMERGENCY DEPT VISIT HI MDM: CPT

## 2023-12-04 PROCEDURE — 88311 DECALCIFY TISSUE: CPT | Mod: 26

## 2023-12-04 RX ORDER — HEPARIN SODIUM 5000 [USP'U]/ML
5000 INJECTION INTRAVENOUS; SUBCUTANEOUS EVERY 8 HOURS
Refills: 0 | Status: DISCONTINUED | OUTPATIENT
Start: 2023-12-04 | End: 2023-12-05

## 2023-12-04 RX ORDER — DEXTROSE 50 % IN WATER 50 %
12.5 SYRINGE (ML) INTRAVENOUS ONCE
Refills: 0 | Status: DISCONTINUED | OUTPATIENT
Start: 2023-12-04 | End: 2023-12-05

## 2023-12-04 RX ORDER — LEVOTHYROXINE SODIUM 125 MCG
1 TABLET ORAL
Refills: 0 | DISCHARGE

## 2023-12-04 RX ORDER — METOPROLOL TARTRATE 50 MG
25 TABLET ORAL DAILY
Refills: 0 | Status: DISCONTINUED | OUTPATIENT
Start: 2023-12-04 | End: 2023-12-05

## 2023-12-04 RX ORDER — LOSARTAN POTASSIUM 100 MG/1
1 TABLET, FILM COATED ORAL
Refills: 0 | DISCHARGE

## 2023-12-04 RX ORDER — PIOGLITAZONE HYDROCHLORIDE 15 MG/1
1 TABLET ORAL
Refills: 0 | DISCHARGE

## 2023-12-04 RX ORDER — METOPROLOL TARTRATE 50 MG
1 TABLET ORAL
Refills: 0 | DISCHARGE

## 2023-12-04 RX ORDER — HYDROCHLOROTHIAZIDE 25 MG
1 TABLET ORAL
Refills: 0 | DISCHARGE

## 2023-12-04 RX ORDER — SODIUM CHLORIDE 9 MG/ML
1000 INJECTION, SOLUTION INTRAVENOUS
Refills: 0 | Status: DISCONTINUED | OUTPATIENT
Start: 2023-12-04 | End: 2023-12-05

## 2023-12-04 RX ORDER — ATORVASTATIN CALCIUM 80 MG/1
1 TABLET, FILM COATED ORAL
Refills: 0 | DISCHARGE

## 2023-12-04 RX ORDER — ATORVASTATIN CALCIUM 80 MG/1
10 TABLET, FILM COATED ORAL AT BEDTIME
Refills: 0 | Status: DISCONTINUED | OUTPATIENT
Start: 2023-12-04 | End: 2023-12-05

## 2023-12-04 RX ORDER — LOSARTAN POTASSIUM 100 MG/1
100 TABLET, FILM COATED ORAL DAILY
Refills: 0 | Status: DISCONTINUED | OUTPATIENT
Start: 2023-12-04 | End: 2023-12-05

## 2023-12-04 RX ORDER — LEVOTHYROXINE SODIUM 125 MCG
50 TABLET ORAL DAILY
Refills: 0 | Status: DISCONTINUED | OUTPATIENT
Start: 2023-12-04 | End: 2023-12-05

## 2023-12-04 RX ORDER — ACETAMINOPHEN 500 MG
650 TABLET ORAL ONCE
Refills: 0 | Status: COMPLETED | OUTPATIENT
Start: 2023-12-04 | End: 2023-12-04

## 2023-12-04 RX ORDER — DEXTROSE 50 % IN WATER 50 %
25 SYRINGE (ML) INTRAVENOUS ONCE
Refills: 0 | Status: DISCONTINUED | OUTPATIENT
Start: 2023-12-04 | End: 2023-12-05

## 2023-12-04 RX ORDER — CEFAZOLIN SODIUM 1 G
2000 VIAL (EA) INJECTION EVERY 8 HOURS
Refills: 0 | Status: DISCONTINUED | OUTPATIENT
Start: 2023-12-04 | End: 2023-12-05

## 2023-12-04 RX ORDER — GLUCAGON INJECTION, SOLUTION 0.5 MG/.1ML
1 INJECTION, SOLUTION SUBCUTANEOUS ONCE
Refills: 0 | Status: DISCONTINUED | OUTPATIENT
Start: 2023-12-04 | End: 2023-12-05

## 2023-12-04 RX ORDER — INSULIN LISPRO 100/ML
VIAL (ML) SUBCUTANEOUS
Refills: 0 | Status: DISCONTINUED | OUTPATIENT
Start: 2023-12-04 | End: 2023-12-05

## 2023-12-04 RX ORDER — DEXTROSE 50 % IN WATER 50 %
15 SYRINGE (ML) INTRAVENOUS ONCE
Refills: 0 | Status: DISCONTINUED | OUTPATIENT
Start: 2023-12-04 | End: 2023-12-05

## 2023-12-04 RX ORDER — ACETAMINOPHEN 500 MG
650 TABLET ORAL EVERY 6 HOURS
Refills: 0 | Status: DISCONTINUED | OUTPATIENT
Start: 2023-12-04 | End: 2023-12-05

## 2023-12-04 RX ORDER — CEFAZOLIN SODIUM 1 G
1000 VIAL (EA) INJECTION ONCE
Refills: 0 | Status: COMPLETED | OUTPATIENT
Start: 2023-12-04 | End: 2023-12-04

## 2023-12-04 RX ORDER — LANOLIN ALCOHOL/MO/W.PET/CERES
3 CREAM (GRAM) TOPICAL AT BEDTIME
Refills: 0 | Status: DISCONTINUED | OUTPATIENT
Start: 2023-12-04 | End: 2023-12-05

## 2023-12-04 RX ADMIN — Medication 25 MILLIGRAM(S): at 15:58

## 2023-12-04 RX ADMIN — LOSARTAN POTASSIUM 100 MILLIGRAM(S): 100 TABLET, FILM COATED ORAL at 15:58

## 2023-12-04 RX ADMIN — Medication 100 MILLIGRAM(S): at 13:05

## 2023-12-04 RX ADMIN — Medication 3: at 17:37

## 2023-12-04 NOTE — H&P ADULT - PROBLEM SELECTOR PLAN 1
-sent in by wound care for evaluation for worsening left 4th toe wound  -WBC 11.96, afebrile, does not meet sepsis criteria at this time  -MRI: Plantar soft tissue wound present at the level of the first metatarsal phalangeal joint without underlying osteomyelitis   -s/p cefazolin 1000mg x1, Acetominophen 650mg  -podiatry consulted, f/u recommendations, possible plan for OR in AM  -continue cefazolin  -NPO after midnight  -pt with nonmodifiable risk factors including diabetes, bradycardia with METS > 4 is medically optimized at this time for low risk debridement procedure pending further cardiac evaluation -sent in by wound care for evaluation for worsening left 4th toe wound  -WBC 11.96, afebrile, does not meet sepsis criteria at this time  -MRI: Plantar soft tissue wound present at the level of the first metatarsal phalangeal joint without underlying osteomyelitis   -s/p cefazolin 1000mg x1, Acetominophen 650mg  -podiatry consulted, f/u recommendations  -continue cefazolin  -ID consulted- Dr Rincon

## 2023-12-04 NOTE — H&P ADULT - ASSESSMENT
73 yoM with PMH of HTN, DM, Hypothyroidism, and hyperlipidemia presents to the ED by wound care for evaluation of infected left 4th toe. Pt states he has chronic pain to the left foot that started 2-3 months ago and has gotten worse since then. He states this has never happened before and denies and is able to ambulate on his own without assistance. Denies fever, chills, chest pain, palpitations, SOB, cough, abdominal pain, nausea, vomiting, diarrhea, constipation, urinary frequency, urgency, or dysuria, headaches, changes in vision, dizziness, numbness, tingling. He recently travelled to the [facility] (returned 2 weeks ago). Denies any recent antibiotic use, or sick contacts. No other complaints at this time.    ED Course:   Vitals: BP: 192/77 , HR: 54 , Temp: 98.1, RR: 18 , SpO2: 99% on RA  Labs:  WBC 11.96 Glucose 192 Alkphos 132  CXR: no acute infiltrate  EKG: Sinus Bradycardia, 54 bpm  MRI: Plantar soft tissue wound present at the level of the first metatarsal phalangeal joint without underlying osteomyelitis   Received Cefazolin 1000 mg x1, Acetominophen 650 mg    73 yoM with PMH of HTN, DM, Hypothyroidism, and hyperlipidemia presents to the ED by wound care for evaluation of infected left 4th toe. Pt states he has chronic pain to the left foot that started 2-3 months ago and has gotten worse since then. He states this has never happened before and denies and is able to ambulate on his own without assistance. Denies fever, chills, chest pain, palpitations, SOB, cough, abdominal pain, nausea, vomiting, diarrhea, constipation, urinary frequency, urgency, or dysuria, headaches, changes in vision, dizziness, numbness, tingling. He recently travelled to the [facility] (returned 2 weeks ago). Denies any recent antibiotic use, or sick contacts. No other complaints at this time.    ED Course:   Vitals: BP: 192/77 , HR: 54 , Temp: 98.1, RR: 18 , SpO2: 99% on RA  Labs:  WBC 11.96 Glucose 192 Alkphos 132  CXR: no acute infiltrate  EKG: Sinus Bradycardia, 54 bpm  MRI: Plantar soft tissue wound present at the level of the first metatarsal phalangeal joint without underlying osteomyelitis   Received Cefazolin 1000 mg x1, Acetominophen 650 mg    73 yoM with PMH of HTN, DM, Hypothyroidism, and hyperlipidemia presents to the ED by wound care for evaluation of infected left 4th toe.

## 2023-12-04 NOTE — H&P ADULT - NSHPPHYSICALEXAM_GEN_ALL_CORE
T(C): 36.7 (12-04-23 @ 10:22), Max: 36.7 (12-04-23 @ 10:22)  HR: 54 (12-04-23 @ 10:22) (54 - 54)  BP: 192/77 (12-04-23 @ 10:22) (192/77 - 192/77)  RR: 18 (12-04-23 @ 10:22) (18 - 18)  SpO2: 99% (12-04-23 @ 10:22) (99% - 99%)    GENERAL: patient appears well, no acute distress, appropriately interactive  EYES: sclera clear, no exudates  ENMT: oropharynx clear without erythema, no exudates, moist mucous membranes  NECK: supple, soft  LUNGS: good air entry bilaterally, clear to auscultation, symmetric breath sounds, no wheezing or rhonchi appreciated  HEART: soft S1/S2, regular rate and rhythm, no murmurs noted, no lower extremity edema  GASTROINTESTINAL: abdomen is soft, nontender, nondistended, normoactive bowel sounds  INTEGUMENT: good skin turgor, warm skin, appears well perfused  MUSCULOSKELETAL: no clubbing or cyanosis, no obvious deformity  NEUROLOGIC: awake, alert, oriented x3, good muscle tone in all 4 extremities  HEME/LYMPH: no obvious ecchymosis or petechiae T(C): 36.7 (12-04-23 @ 10:22), Max: 36.7 (12-04-23 @ 10:22)  HR: 54 (12-04-23 @ 10:22) (54 - 54)  BP: 192/77 (12-04-23 @ 10:22) (192/77 - 192/77)  RR: 18 (12-04-23 @ 10:22) (18 - 18)  SpO2: 99% (12-04-23 @ 10:22) (99% - 99%)    GENERAL: patient appears well, no acute distress, appropriately interactive  EYES: sclera clear, no exudates  ENMT: oropharynx clear without erythema, no exudates, moist mucous membranes  NECK: supple, soft  LUNGS: good air entry bilaterally, clear to auscultation, symmetric breath sounds, no wheezing or rhonchi appreciated  HEART: soft S1/S2, sinus bradycardia with sinus arrythmia, no murmurs noted, +2 pitting edema b/l  GASTROINTESTINAL: abdomen is soft, nontender, nondistended, normoactive bowel sounds  INTEGUMENT: good skin turgor, warm skin, chronic skin changes to b/l LE below the knee, skin fissuring to the left ankle, left foot dressed and bandaged-clean and intact  MUSCULOSKELETAL: no clubbing or cyanosis, no obvious deformity  NEUROLOGIC: awake, alert, oriented x3, good muscle tone in all 4 extremities  HEME/LYMPH: no obvious ecchymosis or petechiae

## 2023-12-04 NOTE — H&P ADULT - NSICDXPASTMEDICALHX_GEN_ALL_CORE_FT
PAST MEDICAL HISTORY:  Diabetes mellitus     Hyperlipidemia     Hypertension     Hypothyroidism

## 2023-12-04 NOTE — CONSULT NOTE ADULT - PROBLEM SELECTOR RECOMMENDATION 9
Patient was seen and evaluated   Patient's wound with warmth,  erythema, edema , increased drainage,   Applied silver alginate and sterile dry dressing to the right foot  Recommend IV antibiotics per infectious disease   Patient was seen by javier Small outpatient and no surgical intervention was recommended in July 2023.  Discussed 4th digit amputation and 4th metatarsal head resection for OM.   Procedure discussed at length with patient regarding risks, complications, benefits, as well as pre/intra/post-operative expectations. Patient verbally consents to procedure and understood discussion regarding procedure and all questions were answered to patient's satisfaction at this time.  Patient scheduled for left 4th digit amputation and 4th metatarsal head resection tomorrow AM. . Discussed with patient  that patient is still high risk for more proximal amputation, loss of limb, sepsis and loss of life.   Cardiac clearance appreciated   Request medical clearance   Please keep patient NPO after midnight for surgery scheduled tomorrow AM. Patient was seen and evaluated   Patient's wound with warmth,  erythema, edema , increased drainage,   Applied silver alginate and sterile dry dressing to the right foot  Recommend IV antibiotics per infectious disease   Patient was seen by vascular Dr. Small outpatient and no surgical intervention was recommended in July 2023.  Discussed 4th digit amputation and 4th metatarsal head resection for OM.   Procedure discussed at length with patient regarding risks, complications, benefits, as well as pre/intra/post-operative expectations. Patient verbally consents to procedure and understood discussion regarding procedure and all questions were answered to patient's satisfaction at this time.  Patient scheduled for left 4th digit amputation and 4th metatarsal head resection tomorrow AM. . Discussed with patient  that patient is still high risk for more proximal amputation, loss of limb, sepsis and loss of life.   Cardiac clearance appreciated   Request medical clearance   Please keep patient NPO after midnight for surgery scheduled tomorrow AM.

## 2023-12-04 NOTE — H&P ADULT - NSHPPOACENTRALVENOUSCATHETER_GEN_ALL_CORE
ID Attending Addendum:    The patient was seen and examined independent of the resident. I was present during the key elements of the history and decision-making process. I agree with the resident's exam, assessment and plan, any exceptions are noted below. With hoarse voice  Had emesis again today with medications   Abdominal pain somewhat improved after slowing down tube feeds    PE  Gen: NAD  HEENT: EOMI  Lungs: CTAB  Heart: RRR  Abdomen: soft, non-tender, non-distended  Extremities: no edema  Skin: healing facial lesions       Impression:  -Fevers, resolved; repeat cx ngtd    -Abdominal pain, nausea/emesis   Â   -ESBL klebsiella sepsis, poa: source pneumonia vs intestinal translocation. UA neg. Â No purulence noted at BAL, but BAL cx + klebsiella  Â Â Â Â Â Â Â Â Â Â Â -s/p permacath removal 5/20; cath tip neg  Â   -Acute hypoxic resp failure/ARDS - improved   Â Â Â Â Â Â Â Â Â Â Â -Intubated 5/19  Â Â Â Â Â Â Â Â Â Â Â -CXR with diffuse pulm infiltrates  Â Â Â Â Â Â Â Â Â Â Â -BAL 5/19: no purulence in airways; cx + klebsiella, and CMV qual +  Â Â Â Â Â Â Â Â Â Â Â -urine legionella and serum legionella Ab neg  Â Â Â Â Â Â Â Â Â Â Â -urine pneumococcal antigen neg  Â   -Recent disseminated VZV, treated. Â Lesions scabbed over; no fresh vesicles  Â Â Â Â Â Â Â Â Â Â Â -VZV pcr blood 5/9 equivocal  Â   -CMV viremia  Â Â Â Â Â Â Â Â Â Â Â -CMV quant 5/24 188 (improved)  Â   -colitis, noted on CT 5/20. C diff neg. Â GI path panelÂ with EAEC. Stool cx neg. Â   -Methemoglobinemia: improved. Dapsone discontinued (last given 5/21)  Â   -SLE and Sjogren's disease. Previously onÂ Cellcept. Â On prednisone 20 mg daily PTA. Â Had been on dapsone for PJP ppx, though now changed to atovaquoneÂ (previous Bactrim discontinued due to concern it was contributing to leukopenia). Â   -ESRD,Â prev on CRRT now plan to transition to intermittent HD  Â   -Leukopenia: resolved; now with improving leukocytosis. Suspect multifactorial (sepsis, steroids)   Â   -thrombocytopenia, resolved. Â Ganciclovir discontinued 5/21.    Â   -Anemia, "improved. Â CT neg for bleed and no evidence of GIB. Dapsone and Ganciclovir have been discontinued. Plan:  - f/u CMV PCR  - f/u CT a/p (planned for tomorrow)   -continue meropenem, dayÂ 13/~14  -continue foscarnet, dayÂ 11Â (day 18Â induction treatment including previous ganciclovir)  -continue Atovaquone ppxÂ   -monitor wbc  -further recommendations to follow      Kristen Metz D.O. [facility], [address] [place] [address] N.  T [phone]  F [phone]  5/31/2022 12:32 PM         Infectious Disease Progress Note    Patient: Sylvie Pop               Sex: female           MRN: [id number]       YOB: 1975      Age:  50year old               Subjective:  Patient has some persistent congestion, says it is improved but is wondering when it will go away. Patient states when given medicine earlier this a.m. she vomited once, states medicine was given maybe too fast for her to handle. Denies n/v fevers chills diarrhea. Objective:  Blood pressure (!) 155/78, pulse (!) 106, temperature 98.4 Â°F (36.9 Â°C), temperature source Oral, resp. rate 18, height 5' 3"" (1.6 m), weight 45.5 kg (100 lb 5 oz), last menstrual period 09/08/2020, SpO2 94 %. Physical Exam:   General appearance: frail appearing but alert and no distress  Lungs: mild coarse breath sounds LLL to auscultation   HEENT: Facial rash dry, NG tube in place  Heart: regular rate and rhythm, no murmur  Abdomen: soft, non tender, mild diffuse tenderness   Extremities: Strong pulses throughout, no edema or redness lower extremities; Mild bruising medially RLE midway between knee and ankle   Skin: no rashes    Lab/Data Reviewed:  CBC:  @RESUFAST2(WBC,RBC,HGB,HCT,PLT)@  BMP:    @RESUFAST2(GLU,NA,K,CL,CO2,BUN,CREATININE,BCR,PO4,CA,CALCIUM)@  Liver Panel:    @RESUFAST2(PROT,ALBUMIN,AST,ALT,ALKPHOS,BILITOT,BILIDIR,BUN,CREATININE,NA,K,CL,HCO3,CA,PO4,ALB)@      Impression  1.  Septic shock secondary to superimposed Klebsiella/CMV pneumonia " complicated by Klebsiella bacteremia  - Fevers resolved  - WBC 16.4 downtrending from 18.4  -FeversÂ resolved  Cultures are negative to date  -ESBL klebsiella sepsis, poa: source pneumonia vs intestinal translocation. UA neg. Â No purulence noted at BAL, but BAL cx + klebsiella  Â Â Â Â Â Â Â Â Â Â Â -s/p permacath removal 5/20; cath tip neg  -Acute hypoxic resp failure/ARDS  Â Â Â Â Â Â Â Â Â Â Â -resolved  Â Â Â Â Â Â Â Â Â Â Â -CXR with diffuse pulm infiltrates  Â Â Â Â Â Â Â Â Â Â Â -BAL 5/19: no purulence in airways; cx + klebsiella, and CMV qual +  Â Â Â Â Â Â Â Â Â Â Â -urine legionella and serum legionella Ab neg  Â Â Â Â Â Â Â Â Â Â Â -urine pneumococcal antigen neg  -Recent disseminated VZV, treated. Â no new lesions  Â Â Â Â Â Â Â Â Â Â Â -VZV pcr blood 5/9 equivocal  -CMV viremia  Â Â Â Â Â Â Â Â Â Â Â -CMV quant 5/24 188 (improved)  -colitis, noted on CT 5/20. C diff neg. Â GI path panelÂ with EAEC. Â Stool cxÂ negative, repeat c diff is negative  -Methemoglobinemia: Â Dapsone discontinued (last given 5/21)  -SLE and Sjogren's disease. Previously onÂ Cellcept. Â On prednisone 20 mg daily PTA. Â Had been on dapsone for PJP ppx, though now changed to atovaquoneÂ (previous Bactrim discontinued due to concern it was contributing to leukopenia). -ESRD--on HD  -leukocytosis persists  -Anemia-stable -CT neg for bleed and no evidence of GIB. Dapsone and Ganciclovir have been discontinued.  Â   Â     Plan:   - continue meropenem day 13/14  - continue atovaquone ppx  - continue foscarnet day 11 (day 18 induction tx including prior ganciclovir)  - both micafungin and foscarnet discontinued yesterday  - HLA  negative   - f/u CMV pcr pending  - f/u CT chest   - monitor WBC   - will continue to follow       Willem Brunner [address] [place] [address] no

## 2023-12-04 NOTE — H&P ADULT - HISTORY OF PRESENT ILLNESS
yo F/M with PMH presents to the ED with     Denies fever, chills, chest pain, palpitations, SOB, cough, abdominal pain, nausea, vomiting, diarrhea, constipation, urinary frequency, urgency, or dysuria, headaches, changes in vision, dizziness, numbness, tingling.  Denies recent travel, recent antibiotic use, or sick contacts.    ED Course:   Vitals: BP: , HR: , Temp: , RR: , SpO2: % on   Labs:    ABG: pH: , PO2: , PCO2: , HCO3: , SpO2: %  UA:   CXR: as per personal read, official read pending   CT:  EKG:   Received in the ED    73 yoM with PMH of HTN, DM, Hypothyroidism, and hyperlipidemia presents to the ED by wound care for evaluation of infected left 4th toe. Pt states he has chronic pain to the left foot that started 2-3 months ago and has gotten worse since then. He states this has never happened before and denies and is able to ambulate on his own without assistance. Denies fever, chills, chest pain, palpitations, SOB, cough, abdominal pain, nausea, vomiting, diarrhea, constipation, urinary frequency, urgency, or dysuria, headaches, changes in vision, dizziness, numbness, tingling. He recently travelled to the [facility] (returned 2 weeks ago). Denies any recent antibiotic use, or sick contacts. No other complaints at this time.    ED Course:   Vitals: BP: 192/77 , HR: 54 , Temp: 98.1, RR: 18 , SpO2: 99% on RA  Labs:  WBC 11.96 Glucose 192 Alkphos 132  CXR: no acute infiltrate  EKG: Sinus Bradycardia, 54 bpm  MRI: Plantar soft tissue wound present at the level of the first metatarsal phalangeal joint without underlying osteomyelitis   Received Cefazolin 1000 mg x1, Acetominophen 650 mg  73 yoM with PMH of HTN, DM, Hypothyroidism, and hyperlipidemia presents to the ED by wound care for evaluation of infected left 4th toe. Pt states he has chronic pain to the left foot that started 2-3 months ago and has gotten worse since then. He states this has never happened before and denies and is able to ambulate on his own without assistance. Denies fever, chills, chest pain, palpitations, SOB, cough, abdominal pain, nausea, vomiting, diarrhea, constipation, urinary frequency, urgency, or dysuria, headaches, changes in vision, dizziness, numbness, tingling. He recently travelled to the [facility] (returned 2 weeks ago). Denies any recent antibiotic use, or sick contacts. No other complaints at this time.    ED Course:   Vitals: BP: 192/77 , HR: 54 , Temp: 98.1, RR: 18 , SpO2: 99% on RA  Labs:  WBC 11.96 Glucose 192 Alkphos 132  CXR: no acute infiltrate  EKG: Sinus Bradycardia, 54 bpm  MRI: Plantar soft tissue wound present at the level of the first metatarsal phalangeal joint without underlying osteomyelitis   Received Cefazolin 1000 mg x1, Acetominophen 650 mg  73 yoM with PMH of HTN, DM, Hypothyroidism, and hyperlipidemia presents to the ED by wound care for evaluation of infected left 4th toe. Pt states he has chronic pain to the left foot that started 2-3 months ago and has progressively worsened. Pt states that he could see the bone, but follows [facility] wound care who recommended ED evaluation. Pt endorses mild pain, but maintains ability to ambulate and sensation of the foot. Denies fever, chills, chest pain, palpitations, SOB, cough, abdominal pain, nausea, vomiting, diarrhea, constipation, urinary frequency, urgency, or dysuria, headaches, numbness, tingling. He recently travelled to the [facility] (returned 2 weeks ago). Denies any recent antibiotic use, or sick contacts. No other complaints at this time.    ED Course:   Vitals: BP: 192/77 , HR: 54 , Temp: 98.1, RR: 18 , SpO2: 99% on RA  Labs:  WBC 11.96 Glucose 192 Alkphos 132  CXR: no acute infiltrate  EKG: Sinus Bradycardia, 54 bpm  MRI: Plantar soft tissue wound present at the level of the first metatarsal phalangeal joint without underlying osteomyelitis   Received Cefazolin 1000 mg x1, Acetominophen 650 mg  73 yoM with PMH of HTN, DM, Hypothyroidism, and hyperlipidemia presents to the ED by wound care for evaluation of infected left 4th toe. Pt states he has chronic pain to the left foot that started 2-3 months ago and has progressively worsened. Pt states that he could see the bone, but follows [facility] wound care who recommended ED evaluation. Pt endorses mild pain, but maintains ability to ambulate and sensation of the foot. Denies fever, chills, chest pain, palpitations, SOB, cough, abdominal pain, nausea, vomiting, diarrhea, constipation, urinary frequency, urgency, or dysuria, headaches, numbness, tingling. He recently travelled to the [facility] (returned 2 weeks ago). Denies any recent antibiotic use, or sick contacts. No other complaints at this time.    ED Course:   Vitals: BP: 192/77 , HR: 54 , Temp: 98.1, RR: 18 , SpO2: 99% on RA  Labs:  WBC 11.96 Glucose 192 Alkphos 132  CXR: no acute infiltrate  EKG: Sinus Bradycardia, 54 bpm  MRI: Plantar soft tissue wound present at the level of the first metatarsal phalangeal joint without underlying osteomyelitis   Received Cefazolin 1000 mg x1, Acetominophen 650 mg  73 yoM with PMH of HTN, DM type 2, Hypothyroidism, and hyperlipidemia presents to the ED by wound care for evaluation of infected left 4th toe. Pt states he has chronic pain to the left foot that started 2-3 months ago and has progressively worsened. Pt states that he could see the bone, but follows [facility] wound care who recommended ED evaluation. Pt endorses mild pain, but maintains ability to ambulate and sensation of the foot. Denies fever, chills, chest pain, palpitations, SOB, cough, abdominal pain, nausea, vomiting, diarrhea, constipation, urinary frequency, urgency, or dysuria, headaches, numbness, tingling. He recently travelled to the [facility] (returned 2 weeks ago). Denies any recent antibiotic use, or sick contacts. No other complaints at this time.    ED Course:   Vitals: BP: 192/77 , HR: 54 , Temp: 98.1, RR: 18 , SpO2: 99% on RA  Labs:  WBC 11.96 Glucose 192 Alkphos 132  CXR: no acute infiltrate  EKG: Sinus Bradycardia, 54 bpm  MRI: Plantar soft tissue wound present at the level of the first metatarsal phalangeal joint without underlying osteomyelitis   Received Cefazolin 1000 mg x1, Acetominophen 650 mg  73 yoM with PMH of HTN, DM type 2, Hypothyroidism, and hyperlipidemia presents to the ED by wound care for evaluation of infected left 4th toe. Pt states he has chronic pain to the left foot that started 2-3 months ago and has progressively worsened. Pt states that he could see the bone, but follows [facility] wound care who recommended ED evaluation. Pt endorses mild pain, but maintains ability to ambulate and sensation of the foot. Denies fever, chills, chest pain, palpitations, SOB, cough, abdominal pain, nausea, vomiting, diarrhea, constipation, urinary frequency, urgency, or dysuria, headaches, numbness, tingling. He recently travelled to the [facility] (returned 2 weeks ago). Denies any recent antibiotic use, or sick contacts. No other complaints at this time.    ED Course:   Vitals: BP: 192/77 , HR: 54 , Temp: 98.1, RR: 18 , SpO2: 99% on RA  Labs:  WBC 11.96 Glucose 192 Alkphos 132  CXR: no acute infiltrate  EKG: Sinus Bradycardia, 54 bpm  MRI: Plantar soft tissue wound present at the level of the first metatarsal phalangeal joint without underlying osteomyelitis   Received Cefazolin 1000 mg x1, Acetominophen 650 mg

## 2023-12-04 NOTE — CONSULT NOTE ADULT - SUBJECTIVE AND OBJECTIVE BOX
Patient is a 73y old  Male who presents with a chief complaint of Foot wound (04 Dec 2023 15:13)      HPI:  73 yoM with PMH of HTN, DM type 2 (on insulin), Hypothyroidism, and hyperlipidemia presents to the ED by wound care for evaluation of infected left 4th toe. Pt states he has chronic pain to the left foot that started 2-3 months ago and has progressively worsened. Pt states that he could see the bone, but follows [facility] wound care who recommended ED evaluation. Pt endorses mild pain, but maintains ability to ambulate and sensation of the foot. Denies fever, chills, chest pain, palpitations, SOB, cough, abdominal pain, nausea, vomiting, diarrhea, constipation, urinary frequency, urgency, or dysuria, headaches, numbness, tingling. He recently travelled to the [facility] (returned 2 weeks ago). Denies any recent antibiotic use, or sick contacts. No other complaints at this time.    ED Course:   Vitals: BP: 192/77 , HR: 54 , Temp: 98.1, RR: 18 , SpO2: 99% on RA  Labs:  WBC 11.96 Glucose 192 Alkphos 132  CXR: no acute infiltrate  EKG: Sinus Bradycardia, 54 bpm  MRI: Plantar soft tissue wound present at the level of the first metatarsal phalangeal joint without underlying osteomyelitis   Received Cefazolin 1000 mg x1, Acetominophen 650 mg  (04 Dec 2023 15:13)        Interval Hx: Pt evaluated at bedside. He complains of new onset dizziness. Denies CP, palpitations, SOB, HA, abd pain. He states he exercises regularly without chest pain.  Outpatient Cardiologist: States he saw a cardiologist on [facility] 1-2 months ago, and had echo and treadmill stress test which were "normal".      PAST MEDICAL & SURGICAL HISTORY:  Hypertension      Diabetes mellitus      Hypothyroidism      Hyperlipidemia      No significant past surgical history                ECHO  FINDINGS: N/A      MEDICATIONS  (STANDING):  atorvastatin 10 milliGRAM(s) Oral at bedtime  ceFAZolin   IVPB 2000 milliGRAM(s) IV Intermittent every 8 hours  dextrose 5%. 1000 milliLiter(s) (100 mL/Hr) IV Continuous <Continuous>  dextrose 5%. 1000 milliLiter(s) (50 mL/Hr) IV Continuous <Continuous>  dextrose 50% Injectable 12.5 Gram(s) IV Push once  dextrose 50% Injectable 25 Gram(s) IV Push once  dextrose 50% Injectable 25 Gram(s) IV Push once  glucagon  Injectable 1 milliGRAM(s) IntraMuscular once  heparin   Injectable 5000 Unit(s) SubCutaneous every 8 hours  hydrochlorothiazide 12.5 milliGRAM(s) Oral daily  insulin lispro (ADMELOG) corrective regimen sliding scale   SubCutaneous three times a day before meals  levothyroxine 50 MICROGram(s) Oral daily  losartan 100 milliGRAM(s) Oral daily  metoprolol succinate ER 25 milliGRAM(s) Oral daily    MEDICATIONS  (PRN):  acetaminophen     Tablet .. 650 milliGRAM(s) Oral every 6 hours PRN Temp greater or equal to 38C (100.4F), Mild Pain (1 - 3)  dextrose Oral Gel 15 Gram(s) Oral once PRN Blood Glucose LESS THAN 70 milliGRAM(s)/deciliter  melatonin 3 milliGRAM(s) Oral at bedtime PRN Insomnia      FAMILY HISTORY:  No pertinent family history in first degree relatives      Denies Family history of CAD or early MI    ROS:  Constitutional: denies fever, chills  HEENT: denies blurry vision, difficulty hearing  Respiratory: denies SOB, WICK, cough  Cardiovascular: denies CP, palpitations, LE edema  Gastrointestinal: denies nausea, vomiting, abdominal pain  Genitourinary: denies urinary changes  Skin: Denies rashes, itching  Neurologic: + dizziness. denies headache, weakness  ROS negative except as noted above      SOCIAL HISTORY:  Alcohol Use: occasionally 1x a week   Tobacco Use: never  Recreational Drug Use: never    Vital Signs Last 24 Hrs  T(C): 36.6 (04 Dec 2023 15:43), Max: 36.7 (04 Dec 2023 10:22)  T(F): 97.8 (04 Dec 2023 15:43), Max: 98.1 (04 Dec 2023 10:22)  HR: 87 (04 Dec 2023 15:43) (54 - 87)  BP: 200/86 (04 Dec 2023 15:43) (192/77 - 200/86)  BP(mean): --  RR: 18 (04 Dec 2023 15:43) (18 - 18)  SpO2: 97% (04 Dec 2023 15:43) (97% - 99%)        Physical Exam:  General: awake, alert, NAD  HEENT: NCAT, moist mucous membranes   Neurology: A&Ox3, nonfocal, sensation intact   Respiratory: CTA B/L, No W/R/R  CV: bradycardia, +S1/S2, no murmurs, rubs or gallops  Abdominal: Soft, NT, ND, no palpable masses  Extremities: trace LE edema. no calf tenderness  MSK: Normal ROM, no joint erythema or warmth, no joint swelling   Heme: No obvious ecchymosis or petechiae   Skin: bandage on left foot, c/d/i      ECG: Sinus bradycardia HR 55    I&O's Detail      LABS:                        14.2   11.96 )-----------( 312      ( 04 Dec 2023 12:57 )             41.4     12-04    136  |  98  |  13  ----------------------------<  192<H>  4.1   |  26  |  1.20    Ca    9.5      04 Dec 2023 12:57    TPro  8.5<H>  /  Alb  3.6  /  TBili  0.7  /  DBili  x   /  AST  19  /  ALT  21  /  AlkPhos  132<H>  12-04          Urinalysis Basic - ( 04 Dec 2023 12:57 )    Color: x / Appearance: x / SG: x / pH: x  Gluc: 192 mg/dL / Ketone: x  / Bili: x / Urobili: x   Blood: x / Protein: x / Nitrite: x   Leuk Esterase: x / RBC: x / WBC x   Sq Epi: x / Non Sq Epi: x / Bacteria: x      I&O's Summary    BNP  RADIOLOGY & ADDITIONAL STUDIES:  < from: Xray Chest 2 Views PA/Lat (12.04.23 @ 13:45) >    ACC: [id number] EXAM:  XR CHEST PA LAT 2V   ORDERED BY: JULEE GEORGE     PROCEDURE DATE:  12/04/2023          INTERPRETATION:  CLINICAL INDICATION: 73 years  Male with admission.    COMPARISON: None    PA and lateral views of the chest demonstrate the lungs to be clear.   There is no pleural effusion. There is no pneumothorax.    The heart is normal in size. There is no mediastinal or hilar mass.  The   pulmonary vasculature is normal.    Mild thoracic degenerative changes are present.    IMPRESSION:    No acute infiltrate.    --- End of Report ---            BRIA BROOKS MD; Attending Radiologist  This document has been electronically signed. Dec  4 2023  1:48PM    < end of copied text >   Patient is a 73y old  Male who presents with a chief complaint of Foot wound (04 Dec 2023 15:13)      HPI:  73 yoM with PMH of HTN, DM type 2 (on insulin), Hypothyroidism, and hyperlipidemia presents to the ED by wound care for evaluation of infected left 4th toe. Pt states he has chronic pain to the left foot that started 2-3 months ago and has progressively worsened. Pt states that he could see the bone, but follows [facility] wound care who recommended ED evaluation. Pt endorses mild pain, but maintains ability to ambulate and sensation of the foot. Denies fever, chills, chest pain, palpitations, SOB, cough, abdominal pain, nausea, vomiting, diarrhea, constipation, urinary frequency, urgency, or dysuria, headaches, numbness, tingling. He recently travelled to the [facility] (returned 2 weeks ago). Denies any recent antibiotic use, or sick contacts. No other complaints at this time.    ED Course:   Vitals: BP: 192/77 , HR: 54 , Temp: 98.1, RR: 18 , SpO2: 99% on RA  Labs:  WBC 11.96 Glucose 192 Alkphos 132  CXR: no acute infiltrate  EKG: Sinus Bradycardia, 54 bpm  MRI: Plantar soft tissue wound present at the level of the first metatarsal phalangeal joint without underlying osteomyelitis   Received Cefazolin 1000 mg x1, Acetominophen 650 mg  (04 Dec 2023 15:13)        Interval Hx: Pt evaluated at bedside. He complains of new onset dizziness. Denies CP, palpitations, SOB, HA, abd pain. He states he exercises regularly without chest pain.  Outpatient Cardiologist: States he saw a cardiologist on [facility] 1-2 months ago, and had echo and treadmill stress test which were "normal".      PAST MEDICAL & SURGICAL HISTORY:  Hypertension      Diabetes mellitus      Hypothyroidism      Hyperlipidemia      No significant past surgical history                ECHO  FINDINGS: N/A      MEDICATIONS  (STANDING):  atorvastatin 10 milliGRAM(s) Oral at bedtime  ceFAZolin   IVPB 2000 milliGRAM(s) IV Intermittent every 8 hours  dextrose 5%. 1000 milliLiter(s) (100 mL/Hr) IV Continuous <Continuous>  dextrose 5%. 1000 milliLiter(s) (50 mL/Hr) IV Continuous <Continuous>  dextrose 50% Injectable 12.5 Gram(s) IV Push once  dextrose 50% Injectable 25 Gram(s) IV Push once  dextrose 50% Injectable 25 Gram(s) IV Push once  glucagon  Injectable 1 milliGRAM(s) IntraMuscular once  heparin   Injectable 5000 Unit(s) SubCutaneous every 8 hours  hydrochlorothiazide 12.5 milliGRAM(s) Oral daily  insulin lispro (ADMELOG) corrective regimen sliding scale   SubCutaneous three times a day before meals  levothyroxine 50 MICROGram(s) Oral daily  losartan 100 milliGRAM(s) Oral daily  metoprolol succinate ER 25 milliGRAM(s) Oral daily    MEDICATIONS  (PRN):  acetaminophen     Tablet .. 650 milliGRAM(s) Oral every 6 hours PRN Temp greater or equal to 38C (100.4F), Mild Pain (1 - 3)  dextrose Oral Gel 15 Gram(s) Oral once PRN Blood Glucose LESS THAN 70 milliGRAM(s)/deciliter  melatonin 3 milliGRAM(s) Oral at bedtime PRN Insomnia      FAMILY HISTORY:  No pertinent family history in first degree relatives      Denies Family history of CAD or early MI    ROS:  Constitutional: denies fever, chills  HEENT: denies blurry vision, difficulty hearing  Respiratory: denies SOB, WICK, cough  Cardiovascular: denies CP, palpitations, LE edema  Gastrointestinal: denies nausea, vomiting, abdominal pain  Genitourinary: denies urinary changes  Skin: Denies rashes, itching  Neurologic: + dizziness. denies headache, weakness  ROS negative except as noted above      SOCIAL HISTORY:  Alcohol Use: occasionally 1x a week   Tobacco Use: never  Recreational Drug Use: never    Vital Signs Last 24 Hrs  T(C): 36.6 (04 Dec 2023 15:43), Max: 36.7 (04 Dec 2023 10:22)  T(F): 97.8 (04 Dec 2023 15:43), Max: 98.1 (04 Dec 2023 10:22)  HR: 87 (04 Dec 2023 15:43) (54 - 87)  BP: 200/86 (04 Dec 2023 15:43) (192/77 - 200/86)  BP(mean): --  RR: 18 (04 Dec 2023 15:43) (18 - 18)  SpO2: 97% (04 Dec 2023 15:43) (97% - 99%)        Physical Exam:  General: awake, alert, NAD  HEENT: NCAT, moist mucous membranes   Neurology: A&Ox3, nonfocal, sensation intact   Respiratory: CTA B/L, No W/R/R  CV: bradycardia, +S1/S2, no murmurs, rubs or gallops  Abdominal: Soft, NT, ND, no palpable masses  Extremities: trace LE edema. no calf tenderness  MSK: Normal ROM, no joint erythema or warmth, no joint swelling   Heme: No obvious ecchymosis or petechiae   Skin: bandage on left foot, c/d/i      ECG: Sinus bradycardia HR 55    I&O's Detail      LABS:                        14.2   11.96 )-----------( 312      ( 04 Dec 2023 12:57 )             41.4     12-04    136  |  98  |  13  ----------------------------<  192<H>  4.1   |  26  |  1.20    Ca    9.5      04 Dec 2023 12:57    TPro  8.5<H>  /  Alb  3.6  /  TBili  0.7  /  DBili  x   /  AST  19  /  ALT  21  /  AlkPhos  132<H>  12-04          Urinalysis Basic - ( 04 Dec 2023 12:57 )    Color: x / Appearance: x / SG: x / pH: x  Gluc: 192 mg/dL / Ketone: x  / Bili: x / Urobili: x   Blood: x / Protein: x / Nitrite: x   Leuk Esterase: x / RBC: x / WBC x   Sq Epi: x / Non Sq Epi: x / Bacteria: x      I&O's Summary    BNP  RADIOLOGY & ADDITIONAL STUDIES:  < from: Xray Chest 2 Views PA/Lat (12.04.23 @ 13:45) >    ACC: [id number] EXAM:  XR CHEST PA LAT 2V   ORDERED BY: JULEE GEORGE     PROCEDURE DATE:  12/04/2023          INTERPRETATION:  CLINICAL INDICATION: 73 years  Male with admission.    COMPARISON: None    PA and lateral views of the chest demonstrate the lungs to be clear.   There is no pleural effusion. There is no pneumothorax.    The heart is normal in size. There is no mediastinal or hilar mass.  The   pulmonary vasculature is normal.    Mild thoracic degenerative changes are present.    IMPRESSION:    No acute infiltrate.    --- End of Report ---            BRIA BROOKS MD; Attending Radiologist  This document has been electronically signed. Dec  4 2023  1:48PM    < end of copied text >

## 2023-12-04 NOTE — ED ADULT NURSE NOTE - NSFALLUNIVINTERV_ED_ALL_ED
Bed/Stretcher in lowest position, wheels locked, appropriate side rails in place/Call bell, personal items and telephone in reach/Instruct patient to call for assistance before getting out of bed/chair/stretcher/Non-slip footwear applied when patient is off stretcher/[place] to call system/Physically safe environment - no spills, clutter or unnecessary equipment/Purposeful proactive rounding/Room/bathroom lighting operational, light cord in reach Bed/Stretcher in lowest position, wheels locked, appropriate side rails in place/Call bell, personal items and telephone in reach/Instruct patient to call for assistance before getting out of bed/chair/stretcher/Non-slip footwear applied when patient is off stretcher/[place] to call system/Physically safe environment - no spills, clutter or unnecessary equipment/Purposeful proactive rounding/Room/bathroom lighting operational, light cord in reach

## 2023-12-04 NOTE — H&P ADULT - NSHPSOCIALHISTORY_GEN_ALL_CORE
Lives:  ADLs:  Diet:  Vaccination:  Occupation:  Alcohol Use:  Tobacco Use:  Recreational Drug Use: Lives: with his wife and daughter  ADLs: independently   Diet: diabetic  Vaccination: flu shot 12/3/23  Occupation:  Alcohol Use: occasionally 1x a week   Tobacco Use: never  Recreational Drug Use: never

## 2023-12-04 NOTE — CONSULT NOTE ADULT - SUBJECTIVE AND OBJECTIVE BOX
HPI:  73 yoM with PMH of HTN, DM type 2, Hypothyroidism, and hyperlipidemia presents to the ED by wound care for evaluation of infected left 4th toe. Pt states he has chronic pain to the left foot that started 2-3 months ago and has progressively worsened. Pt states that he could see the bone, but follows with wound care who recommended ED evaluation. Pt endorses mild pain, but maintains ability to ambulate and sensation of the foot. Denies fever, chills, chest pain, palpitations, SOB, cough, abdominal pain, nausea, vomiting, diarrhea, constipation, urinary frequency, urgency, or dysuria, headaches, numbness, tingling. He recently travelled to the [facility] (returned 2 weeks ago). Denies any recent antibiotic use, or sick contacts. No other complaints at this time.    ED Course:   Vitals: BP: 192/77 , HR: 54 , Temp: 98.1, RR: 18 , SpO2: 99% on RA  Labs:  WBC 11.96 Glucose 192 Alkphos 132  CXR: no acute infiltrate  EKG: Sinus Bradycardia, 54 bpm  MRI: Plantar soft tissue wound present at the level of the first metatarsal phalangeal joint without underlying osteomyelitis   Received Cefazolin 1000 mg x1, Acetominophen 650 mg  (04 Dec 2023 15:13)      73y year old Male seen at [facility] ([place] 07 for left foot wound to the 4th and 5th digits. Patient does not recollect how the wound started but noticed it about 2-3 weeks ago. Patient denies any pain to the foot wound. Patient was seen in the wound care center and has the bone of the proximal phalanx exposed, the exposed bone was removed and sent to pathology. Patient was advised to proceed to the ER for further treatment and surgical intervention.   Denies any fever, chills, nausea, vomiting, chest pain, shortness of breath, or calf pain at this time.    REVIEW OF SYSTEMS    PAST MEDICAL & SURGICAL HISTORY:  Hypertension      Diabetes mellitus      Hypothyroidism      Hyperlipidemia      No significant past surgical history          Allergies    No Known Allergies    Intolerances        MEDICATIONS  (STANDING):  atorvastatin 10 milliGRAM(s) Oral at bedtime  ceFAZolin   IVPB 2000 milliGRAM(s) IV Intermittent every 8 hours  dextrose 5%. 1000 milliLiter(s) (100 mL/Hr) IV Continuous <Continuous>  dextrose 5%. 1000 milliLiter(s) (50 mL/Hr) IV Continuous <Continuous>  dextrose 50% Injectable 12.5 Gram(s) IV Push once  dextrose 50% Injectable 25 Gram(s) IV Push once  dextrose 50% Injectable 25 Gram(s) IV Push once  glucagon  Injectable 1 milliGRAM(s) IntraMuscular once  heparin   Injectable 5000 Unit(s) SubCutaneous every 8 hours  hydrochlorothiazide 12.5 milliGRAM(s) Oral daily  insulin lispro (ADMELOG) corrective regimen sliding scale   SubCutaneous three times a day before meals  levothyroxine 50 MICROGram(s) Oral daily  losartan 100 milliGRAM(s) Oral daily  metoprolol succinate ER 25 milliGRAM(s) Oral daily    MEDICATIONS  (PRN):  acetaminophen     Tablet .. 650 milliGRAM(s) Oral every 6 hours PRN Temp greater or equal to 38C (100.4F), Mild Pain (1 - 3)  dextrose Oral Gel 15 Gram(s) Oral once PRN Blood Glucose LESS THAN 70 milliGRAM(s)/deciliter  melatonin 3 milliGRAM(s) Oral at bedtime PRN Insomnia      Social History:  Lives: with his wife and daughter  ADLs: independently   Diet: diabetic  Vaccination: flu shot 12/3/23  Occupation:  Alcohol Use: occasionally 1x a week   Tobacco Use: never  Recreational Drug Use: never (04 Dec 2023 15:13)      FAMILY HISTORY:  No pertinent family history in first degree relatives        Vital Signs Last 24 Hrs  T(C): 36.8 (04 Dec 2023 16:43), Max: 36.8 (04 Dec 2023 16:43)  T(F): 98.2 (04 Dec 2023 16:43), Max: 98.2 (04 Dec 2023 16:43)  HR: 86 (04 Dec 2023 16:43) (54 - 87)  BP: 162/79 (04 Dec 2023 16:43) (162/79 - 200/86)  BP(mean): --  RR: 18 (04 Dec 2023 16:43) (18 - 18)  SpO2: 98% (04 Dec 2023 16:43) (97% - 99%)        PHYSICAL EXAM:  Vascular: DP & PT 1/4  palpable bilaterally, Capillary refill 3 seconds, Digital hair absent bilaterally  Neurological: Light touch sensation intact bilaterally  Musculoskeletal: 5/5 strength in all quadrants bilaterally, AJ & STJ ROM intact  Dermatological: Left foot lateral 4th digit 2,0cm x 1.0cm 0.5cm (+) with exposed bone and tunneling to the 4th metatarsal head  with edema and erythema and medial ulceration noted to the 5th digit 1.0 cm x 0.5cm x 0.2 cm  granular wound bed, no probe to bone, no periwound erythema, no fluctuance, no malodor, no proximal streaking at this time. Sub 1st metatarsal head wound with 0.2cm x 0.3cm x 0.1 cm with no edema or erythema     CBC Full  -  ( 04 Dec 2023 12:57 )  WBC Count : 11.96 K/uL  RBC Count : 5.15 M/uL  Hemoglobin : 14.2 g/dL  Hematocrit : 41.4 %  Platelet Count - Automated : 312 K/uL  Mean Cell Volume : 80.4 fl  Mean Cell Hemoglobin : 27.6 pg  Mean Cell Hemoglobin Concentration : 34.3 gm/dL  Auto Neutrophil # : 8.06 K/uL  Auto Lymphocyte # : 2.39 K/uL  Auto Monocyte # : 1.03 K/uL  Auto Eosinophil # : 0.31 K/uL  Auto Basophil # : 0.09 K/uL  Auto Neutrophil % : 67.3 %  Auto Lymphocyte % : 20.0 %  Auto Monocyte % : 8.6 %  Auto Eosinophil % : 2.6 %  Auto Basophil % : 0.8 %      12-04    136  |  98  |  13  ----------------------------<  192<H>  4.1   |  26  |  1.20    Ca    9.5      04 Dec 2023 12:57    TPro  8.5<H>  /  Alb  3.6  /  TBili  0.7  /  DBili  x   /  AST  19  /  ALT  21  /  AlkPhos  132<H>  12-04                          14.2   11.96 )-----------( 312      ( 04 Dec 2023 12:57 )             41.4         Imaging: ----------   HPI:  73 yoM with PMH of HTN, DM type 2, Hypothyroidism, and hyperlipidemia presents to the ED by wound care for evaluation of infected left 4th toe. Pt states he has chronic pain to the left foot that started 2-3 months ago and has progressively worsened. Pt states that he could see the bone, but follows with wound care who recommended ED evaluation. Pt endorses mild pain, but maintains ability to ambulate and sensation of the foot. Denies fever, chills, chest pain, palpitations, SOB, cough, abdominal pain, nausea, vomiting, diarrhea, constipation, urinary frequency, urgency, or dysuria, headaches, numbness, tingling. He recently travelled to the [facility] (returned 2 weeks ago). Denies any recent antibiotic use, or sick contacts. No other complaints at this time.    ED Course:   Vitals: BP: 192/77 , HR: 54 , Temp: 98.1, RR: 18 , SpO2: 99% on RA  Labs:  WBC 11.96 Glucose 192 Alkphos 132  CXR: no acute infiltrate  EKG: Sinus Bradycardia, 54 bpm  MRI: Plantar soft tissue wound present at the level of the first metatarsal phalangeal joint without underlying osteomyelitis   Received Cefazolin 1000 mg x1, Acetominophen 650 mg  (04 Dec 2023 15:13)      73y year old Male seen at [facility] 07 for left foot wound to the 4th and 5th digits. Patient does not recollect how the wound started but noticed it about 2-3 weeks ago. Patient denies any pain to the foot wound. Patient was seen in the wound care center and has the bone of the proximal phalanx exposed, the exposed bone was removed and sent to pathology. Patient was advised to proceed to the ER for further treatment and surgical intervention.   Denies any fever, chills, nausea, vomiting, chest pain, shortness of breath, or calf pain at this time.    REVIEW OF SYSTEMS    PAST MEDICAL & SURGICAL HISTORY:  Hypertension      Diabetes mellitus      Hypothyroidism      Hyperlipidemia      No significant past surgical history          Allergies    No Known Allergies    Intolerances        MEDICATIONS  (STANDING):  atorvastatin 10 milliGRAM(s) Oral at bedtime  ceFAZolin   IVPB 2000 milliGRAM(s) IV Intermittent every 8 hours  dextrose 5%. 1000 milliLiter(s) (100 mL/Hr) IV Continuous <Continuous>  dextrose 5%. 1000 milliLiter(s) (50 mL/Hr) IV Continuous <Continuous>  dextrose 50% Injectable 12.5 Gram(s) IV Push once  dextrose 50% Injectable 25 Gram(s) IV Push once  dextrose 50% Injectable 25 Gram(s) IV Push once  glucagon  Injectable 1 milliGRAM(s) IntraMuscular once  heparin   Injectable 5000 Unit(s) SubCutaneous every 8 hours  hydrochlorothiazide 12.5 milliGRAM(s) Oral daily  insulin lispro (ADMELOG) corrective regimen sliding scale   SubCutaneous three times a day before meals  levothyroxine 50 MICROGram(s) Oral daily  losartan 100 milliGRAM(s) Oral daily  metoprolol succinate ER 25 milliGRAM(s) Oral daily    MEDICATIONS  (PRN):  acetaminophen     Tablet .. 650 milliGRAM(s) Oral every 6 hours PRN Temp greater or equal to 38C (100.4F), Mild Pain (1 - 3)  dextrose Oral Gel 15 Gram(s) Oral once PRN Blood Glucose LESS THAN 70 milliGRAM(s)/deciliter  melatonin 3 milliGRAM(s) Oral at bedtime PRN Insomnia      Social History:  Lives: with his wife and daughter  ADLs: independently   Diet: diabetic  Vaccination: flu shot 12/3/23  Occupation:  Alcohol Use: occasionally 1x a week   Tobacco Use: never  Recreational Drug Use: never (04 Dec 2023 15:13)      FAMILY HISTORY:  No pertinent family history in first degree relatives        Vital Signs Last 24 Hrs  T(C): 36.8 (04 Dec 2023 16:43), Max: 36.8 (04 Dec 2023 16:43)  T(F): 98.2 (04 Dec 2023 16:43), Max: 98.2 (04 Dec 2023 16:43)  HR: 86 (04 Dec 2023 16:43) (54 - 87)  BP: 162/79 (04 Dec 2023 16:43) (162/79 - 200/86)  BP(mean): --  RR: 18 (04 Dec 2023 16:43) (18 - 18)  SpO2: 98% (04 Dec 2023 16:43) (97% - 99%)        PHYSICAL EXAM:  Vascular: DP & PT 1/4  palpable bilaterally, Capillary refill 3 seconds, Digital hair absent bilaterally  Neurological: Light touch sensation intact bilaterally  Musculoskeletal: 5/5 strength in all quadrants bilaterally, AJ & STJ ROM intact  Dermatological: Left foot lateral 4th digit 2,0cm x 1.0cm 0.5cm (+) with exposed bone and tunneling to the 4th metatarsal head  with edema and erythema and medial ulceration noted to the 5th digit 1.0 cm x 0.5cm x 0.2 cm  granular wound bed, no probe to bone, no periwound erythema, no fluctuance, no malodor, no proximal streaking at this time. Sub 1st metatarsal head wound with 0.2cm x 0.3cm x 0.1 cm with no edema or erythema     CBC Full  -  ( 04 Dec 2023 12:57 )  WBC Count : 11.96 K/uL  RBC Count : 5.15 M/uL  Hemoglobin : 14.2 g/dL  Hematocrit : 41.4 %  Platelet Count - Automated : 312 K/uL  Mean Cell Volume : 80.4 fl  Mean Cell Hemoglobin : 27.6 pg  Mean Cell Hemoglobin Concentration : 34.3 gm/dL  Auto Neutrophil # : 8.06 K/uL  Auto Lymphocyte # : 2.39 K/uL  Auto Monocyte # : 1.03 K/uL  Auto Eosinophil # : 0.31 K/uL  Auto Basophil # : 0.09 K/uL  Auto Neutrophil % : 67.3 %  Auto Lymphocyte % : 20.0 %  Auto Monocyte % : 8.6 %  Auto Eosinophil % : 2.6 %  Auto Basophil % : 0.8 %      12-04    136  |  98  |  13  ----------------------------<  192<H>  4.1   |  26  |  1.20    Ca    9.5      04 Dec 2023 12:57    TPro  8.5<H>  /  Alb  3.6  /  TBili  0.7  /  DBili  x   /  AST  19  /  ALT  21  /  AlkPhos  132<H>  12-04                          14.2   11.96 )-----------( 312      ( 04 Dec 2023 12:57 )             41.4         Imaging: ----------

## 2023-12-04 NOTE — ED PROVIDER NOTE - CLINICAL SUMMARY MEDICAL DECISION MAKING FREE TEXT BOX
Philip: 72 yo M PMHx HTN, DM sent in by wound care for evaluation of infected left 4th toe. Pt to have surgical intervention likely tomorrow. Pt reports chronic pain to toe. Denies fever/chills, numbness/tingling. Philip: 74 yo M PMHx HTN, DM sent in by wound care for evaluation of infected left 4th toe. Pt to have surgical intervention likely tomorrow. Pt reports chronic pain to toe. Denies fever/chills, numbness/tingling.

## 2023-12-04 NOTE — H&P ADULT - PROBLEM SELECTOR PLAN 3
-BP on admission 192/77  -likely reactive to pain from foot wound infection   -home medications of metoprolol, hydrochlorothiazide, and losartan with hold parameters

## 2023-12-04 NOTE — ED PROVIDER NOTE - OBJECTIVE STATEMENT
74 yo M PMHx HTN, DM sent in by wound care for evaluation of infected left 4th toe. Pt to have surgical intervention likely tomorrow. Pt reports chronic pain to toe. Denies fever/chills, numbness/tingling. 72 yo M PMHx HTN, DM sent in by wound care for evaluation of infected left 4th toe. Pt to have surgical intervention likely tomorrow. Pt reports chronic pain to toe. Denies fever/chills, numbness/tingling.

## 2023-12-04 NOTE — H&P ADULT - ATTENDING COMMENTS
73 yoM with PMH of HTN, DM type 2, Hypothyroidism, and hyperlipidemia presents to the ED by wound care for evaluation of infected left 4th toe.     HPI as above.     Plan:  Cellulitis: continue IV abx  -f/u culture results  -monitor for fever, trend WBC count.   -ID consulted    Sinus bradycardia: asymptomatic  -can continue metoprolol but with hold parameters  -cardio consulted, Dr Kyle    DVT ppx: heparin

## 2023-12-04 NOTE — H&P ADULT - PROBLEM SELECTOR PLAN 2
EKG: Sinus Bradycardia, 54 bpm  -sinus arrythmia appreciated on auscultation  -no prior EKGs on record for comparison  -Pt states that his HR usually runs low  -f/u AM mg/phos EKG: Sinus Bradycardia, 54 bpm  -sinus arrythmia appreciated on auscultation  -no prior EKGs on record for comparison  -Pt states that his HR usually runs low  -f/u AM mg/phos  -cardio consulted

## 2023-12-04 NOTE — CONSULT NOTE ADULT - SUBJECTIVE AND OBJECTIVE BOX
[facility]  INFECTIOUS DISEASES   [address] [place] [address]  Tel: [phone]     Fax: [phone]  ========================================================  MD Cedrick Sanz Kaushal, MD Cho, Michelle, MD Sunjit, Jaspal, MD  ========================================================    MRN-[id number]  VALENTIN CERON     CC: Left Foot wound     HPI:  74 yo man with PMH of HTN, DM2, and Hypothyroidism was admitted with an ulcer in left 4th toe. He has chronic pain to the left foot that started 2-3 months ago and has progressively worsened. Pt states that he could see the bone, follows with wound care who recommended MRI and hospitalization.   Pt endorses mild pain, but maintains ability to ambulate and sensation of the foot. Denies fever, chills, chest pain, palpitations, SOB, cough, abdominal pain, nausea, vomiting, diarrhea, constipation, urinary frequency, urgency, or dysuria, headaches, numbness, tingling. He recently travelled to the [facility] (returned 2 weeks ago). Denies any recent antibiotic use, or sick contacts. No other complaints at this time.    PAST MEDICAL & SURGICAL HISTORY:  Hypertension  Diabetes mellitus  Hypothyroidism  Hyperlipidemia  No significant past surgical history    Social Hx: No smoking, EtOH or drugs     FAMILY HISTORY:  No pertinent family history in first degree relatives    Allergies  No Known Allergies    Antibiotics:  MEDICATIONS  (STANDING):  atorvastatin 10 milliGRAM(s) Oral at bedtime  ceFAZolin   IVPB 2000 milliGRAM(s) IV Intermittent every 8 hours  dextrose 5%. 1000 milliLiter(s) (50 mL/Hr) IV Continuous <Continuous>  dextrose 5%. 1000 milliLiter(s) (100 mL/Hr) IV Continuous <Continuous>  dextrose 50% Injectable 12.5 Gram(s) IV Push once  dextrose 50% Injectable 25 Gram(s) IV Push once  dextrose 50% Injectable 25 Gram(s) IV Push once  glucagon  Injectable 1 milliGRAM(s) IntraMuscular once  heparin   Injectable 5000 Unit(s) SubCutaneous every 8 hours  hydrochlorothiazide 12.5 milliGRAM(s) Oral daily  insulin lispro (ADMELOG) corrective regimen sliding scale   SubCutaneous three times a day before meals  levothyroxine 50 MICROGram(s) Oral daily  losartan 100 milliGRAM(s) Oral daily  metoprolol succinate ER 25 milliGRAM(s) Oral daily    MEDICATIONS  (PRN):  acetaminophen     Tablet .. 650 milliGRAM(s) Oral every 6 hours PRN Temp greater or equal to 38C (100.4F), Mild Pain (1 - 3)  dextrose Oral Gel 15 Gram(s) Oral once PRN Blood Glucose LESS THAN 70 milliGRAM(s)/deciliter  melatonin 3 milliGRAM(s) Oral at bedtime PRN Insomnia     REVIEW OF SYSTEMS:  CONSTITUTIONAL:  No Fever or chills  HEENT:  No diplopia or blurred vision.  No sore throat or runny nose.  CARDIOVASCULAR:  No chest pain or SOB.  RESPIRATORY:  No cough, shortness of breath, PND or orthopnea.  GASTROINTESTINAL:  No nausea, vomiting or diarrhea.  GENITOURINARY:  No dysuria, frequency or urgency. No Blood in urine  MUSCULOSKELETAL:  no joint aches, no muscle pain  SKIN:  foot ulcer   NEUROLOGIC:  No paresthesias or weakness.  PSYCHIATRIC:  No disorder of thought or mood.  ENDOCRINE:  No heat or cold intolerance, polyuria or polydipsia.  HEMATOLOGICAL:  No easy bruising or bleeding.     Physical Exam:  Vital Signs Last 24 Hrs  T(C): 36.6 (04 Dec 2023 15:43), Max: 36.7 (04 Dec 2023 10:22)  T(F): 97.8 (04 Dec 2023 15:43), Max: 98.1 (04 Dec 2023 10:22)  HR: 87 (04 Dec 2023 15:43) (54 - 87)  BP: 200/86 (04 Dec 2023 15:43) (192/77 - 200/86)  BP(mean): --  RR: 18 (04 Dec 2023 15:43) (18 - 18)  SpO2: 97% (04 Dec 2023 15:43) (97% - 99%)  Height (cm): 180.3 (12-04 @ 10:22)  Weight (kg): 79.4 (12-04 @ 10:22)  BMI (kg/m2): 24.4 (12-04 @ 10:22)  BSA (m2): 1.99 (12-04 @ 10:22)  GEN: NAD  HEENT: normocephalic and atraumatic. EOMI. PERRL.    NECK: Supple.  No lymphadenopathy   LUNGS: Clear to auscultation.  HEART: Regular rate and rhythm without murmur.  ABDOMEN: Soft, nontender, and nondistended.  Positive bowel sounds.    : No CVA tenderness  EXTREMITIES: Without edema.  NEUROLOGIC: grossly intact.  PSYCHIATRIC: Appropriate affect .  SKIN: lateral side of left 4th toe with an open ulcer and priwound erythema with some   serosanguinous discharge     Labs:  12-04    136  |  98  |  13  ----------------------------<  192<H>  4.1   |  26  |  1.20    Ca    9.5      04 Dec 2023 12:57    TPro  8.5<H>  /  Alb  3.6  /  TBili  0.7  /  DBili  x   /  AST  19  /  ALT  21  /  AlkPhos  132<H>  12-04                        14.2   11.96 )-----------( 312      ( 04 Dec 2023 12:57 )             41.4     Urinalysis Basic - ( 04 Dec 2023 12:57 )    Color: x / Appearance: x / SG: x / pH: x  Gluc: 192 mg/dL / Ketone: x  / Bili: x / Urobili: x   Blood: x / Protein: x / Nitrite: x   Leuk Esterase: x / RBC: x / WBC x   Sq Epi: x / Non Sq Epi: x / Bacteria: x    LIVER FUNCTIONS - ( 04 Dec 2023 12:57 )  Alb: 3.6 g/dL / Pro: 8.5 g/dL / ALK PHOS: 132 U/L / ALT: 21 U/L / AST: 19 U/L / GGT: x           All imaging and other data have been reviewed.  < from: Xray Chest 2 Views PA/Lat (12.04.23 @ 13:45) >  IMPRESSION:  No acute infiltrate.    < from: MR Foot w/wo IV Cont, Left (10.09.23 @ 10:29) >  IMPRESSION:  1.  Plantar soft tissue wound is present at the level of the 1st   metatarsophalangeal joint without underlying osteomyelitis.  2.  Changes of the 4th proximal and middle phalanges are concerning for   osteomyelitis in the appropriate setting.  3.  Confluent edema involving the plantar subcutaneous tissues of the 4th   toe may reflect early phlegmon formation without discrete abscess.      Assessment and Plan:   74 yo man with PMH of HTN, DM2, and Hypothyroidism was admitted with an ulcer in left 4th toe. He has chronic pain to the left foot that started 2-3 months ago and has progressively worsened. Pt states that he could see the bone, follows with wound care who recommended MRI and hospitalization.     # Left 4th toe ulcer   - Will follow cutlures   - MRI of foot  - Podiatry consult for possible surgical intervention   - Continue cefazolin for now   - Based on culture and pathology will decide about the treatment regimen and length    Thank you for courtesy of this consult.     Will follow.  Discussed with the primary team.     Roverto Rincon MD  Division of Infectious Diseases   Please call ID service at [phone] with any question.    75 minutes spent on total encounter assessing patient, examination, chart review, counseling and coordinating care by the attending physician/nurse/care manager.   [facility]  INFECTIOUS DISEASES   [address] [place] [address]  Tel: [phone]     Fax: [phone]  ========================================================  MD Cedrick Sanz Kaushal, MD Cho, Michelle, MD Sunjit, Jaspal, MD  ========================================================    MRN-[id number]  VALENTIN CERON     CC: Left Foot wound     HPI:  74 yo man with PMH of HTN, DM2, and Hypothyroidism was admitted with an ulcer in left 4th toe. He has chronic pain to the left foot that started 2-3 months ago and has progressively worsened. Pt states that he could see the bone, follows with wound care who recommended MRI and hospitalization.   Pt endorses mild pain, but maintains ability to ambulate and sensation of the foot. Denies fever, chills, chest pain, palpitations, SOB, cough, abdominal pain, nausea, vomiting, diarrhea, constipation, urinary frequency, urgency, or dysuria, headaches, numbness, tingling. He recently travelled to the [facility] (returned 2 weeks ago). Denies any recent antibiotic use, or sick contacts. No other complaints at this time.    PAST MEDICAL & SURGICAL HISTORY:  Hypertension  Diabetes mellitus  Hypothyroidism  Hyperlipidemia  No significant past surgical history    Social Hx: No smoking, EtOH or drugs     FAMILY HISTORY:  No pertinent family history in first degree relatives    Allergies  No Known Allergies    Antibiotics:  MEDICATIONS  (STANDING):  atorvastatin 10 milliGRAM(s) Oral at bedtime  ceFAZolin   IVPB 2000 milliGRAM(s) IV Intermittent every 8 hours  dextrose 5%. 1000 milliLiter(s) (50 mL/Hr) IV Continuous <Continuous>  dextrose 5%. 1000 milliLiter(s) (100 mL/Hr) IV Continuous <Continuous>  dextrose 50% Injectable 12.5 Gram(s) IV Push once  dextrose 50% Injectable 25 Gram(s) IV Push once  dextrose 50% Injectable 25 Gram(s) IV Push once  glucagon  Injectable 1 milliGRAM(s) IntraMuscular once  heparin   Injectable 5000 Unit(s) SubCutaneous every 8 hours  hydrochlorothiazide 12.5 milliGRAM(s) Oral daily  insulin lispro (ADMELOG) corrective regimen sliding scale   SubCutaneous three times a day before meals  levothyroxine 50 MICROGram(s) Oral daily  losartan 100 milliGRAM(s) Oral daily  metoprolol succinate ER 25 milliGRAM(s) Oral daily    MEDICATIONS  (PRN):  acetaminophen     Tablet .. 650 milliGRAM(s) Oral every 6 hours PRN Temp greater or equal to 38C (100.4F), Mild Pain (1 - 3)  dextrose Oral Gel 15 Gram(s) Oral once PRN Blood Glucose LESS THAN 70 milliGRAM(s)/deciliter  melatonin 3 milliGRAM(s) Oral at bedtime PRN Insomnia     REVIEW OF SYSTEMS:  CONSTITUTIONAL:  No Fever or chills  HEENT:  No diplopia or blurred vision.  No sore throat or runny nose.  CARDIOVASCULAR:  No chest pain or SOB.  RESPIRATORY:  No cough, shortness of breath, PND or orthopnea.  GASTROINTESTINAL:  No nausea, vomiting or diarrhea.  GENITOURINARY:  No dysuria, frequency or urgency. No Blood in urine  MUSCULOSKELETAL:  no joint aches, no muscle pain  SKIN:  foot ulcer   NEUROLOGIC:  No paresthesias or weakness.  PSYCHIATRIC:  No disorder of thought or mood.  ENDOCRINE:  No heat or cold intolerance, polyuria or polydipsia.  HEMATOLOGICAL:  No easy bruising or bleeding.     Physical Exam:  Vital Signs Last 24 Hrs  T(C): 36.6 (04 Dec 2023 15:43), Max: 36.7 (04 Dec 2023 10:22)  T(F): 97.8 (04 Dec 2023 15:43), Max: 98.1 (04 Dec 2023 10:22)  HR: 87 (04 Dec 2023 15:43) (54 - 87)  BP: 200/86 (04 Dec 2023 15:43) (192/77 - 200/86)  BP(mean): --  RR: 18 (04 Dec 2023 15:43) (18 - 18)  SpO2: 97% (04 Dec 2023 15:43) (97% - 99%)  Height (cm): 180.3 (12-04 @ 10:22)  Weight (kg): 79.4 (12-04 @ 10:22)  BMI (kg/m2): 24.4 (12-04 @ 10:22)  BSA (m2): 1.99 (12-04 @ 10:22)  GEN: NAD  HEENT: normocephalic and atraumatic. EOMI. PERRL.    NECK: Supple.  No lymphadenopathy   LUNGS: Clear to auscultation.  HEART: Regular rate and rhythm without murmur.  ABDOMEN: Soft, nontender, and nondistended.  Positive bowel sounds.    : No CVA tenderness  EXTREMITIES: Without edema.  NEUROLOGIC: grossly intact.  PSYCHIATRIC: Appropriate affect .  SKIN: lateral side of left 4th toe with an open ulcer and priwound erythema with some   serosanguinous discharge     Labs:  12-04    136  |  98  |  13  ----------------------------<  192<H>  4.1   |  26  |  1.20    Ca    9.5      04 Dec 2023 12:57    TPro  8.5<H>  /  Alb  3.6  /  TBili  0.7  /  DBili  x   /  AST  19  /  ALT  21  /  AlkPhos  132<H>  12-04                        14.2   11.96 )-----------( 312      ( 04 Dec 2023 12:57 )             41.4     Urinalysis Basic - ( 04 Dec 2023 12:57 )    Color: x / Appearance: x / SG: x / pH: x  Gluc: 192 mg/dL / Ketone: x  / Bili: x / Urobili: x   Blood: x / Protein: x / Nitrite: x   Leuk Esterase: x / RBC: x / WBC x   Sq Epi: x / Non Sq Epi: x / Bacteria: x    LIVER FUNCTIONS - ( 04 Dec 2023 12:57 )  Alb: 3.6 g/dL / Pro: 8.5 g/dL / ALK PHOS: 132 U/L / ALT: 21 U/L / AST: 19 U/L / GGT: x           All imaging and other data have been reviewed.  < from: Xray Chest 2 Views PA/Lat (12.04.23 @ 13:45) >  IMPRESSION:  No acute infiltrate.    < from: MR Foot w/wo IV Cont, Left (10.09.23 @ 10:29) >  IMPRESSION:  1.  Plantar soft tissue wound is present at the level of the 1st   metatarsophalangeal joint without underlying osteomyelitis.  2.  Changes of the 4th proximal and middle phalanges are concerning for   osteomyelitis in the appropriate setting.  3.  Confluent edema involving the plantar subcutaneous tissues of the 4th   toe may reflect early phlegmon formation without discrete abscess.      Assessment and Plan:   74 yo man with PMH of HTN, DM2, and Hypothyroidism was admitted with an ulcer in left 4th toe. He has chronic pain to the left foot that started 2-3 months ago and has progressively worsened. Pt states that he could see the bone, follows with wound care who recommended MRI and hospitalization.     # Left 4th toe ulcer   - Will follow cutlures   - MRI of foot  - Podiatry consult for possible surgical intervention   - Continue cefazolin for now   - Based on culture and pathology will decide about the treatment regimen and length    Thank you for courtesy of this consult.     Will follow.  Discussed with the primary team.     Roverto Rincon MD  Division of Infectious Diseases   Please call ID service at [phone] with any question.    75 minutes spent on total encounter assessing patient, examination, chart review, counseling and coordinating care by the attending physician/nurse/care manager.

## 2023-12-04 NOTE — H&P ADULT - NSHPREVIEWOFSYSTEMS_GEN_ALL_CORE
As per above Denies headaches, nausea, vomiting, chest pain, SOB, palpitations, abdominal pain, constipation, diarrhea, melena, hematochezia, dysuria.

## 2023-12-04 NOTE — ED PROVIDER NOTE - WET READ LAUNCH FT
Patient : Jono Romero Age: 32 year old Sex: male   MRN: [id number] Encounter Date: 4/27/2018      History     Chief Complaint   Patient presents with   • Surgical Followup   • Penis/Scrotum Problem     HPI  32-year-old male status post recent circumcision presents tonight for evaluation by urology, Dr. Andrade secondary to pain and bleeding at home tonight  No Known Allergies    Discharge Medication List as of 4/27/2018  5:15 AM      CONTINUE these medications which have NOT CHANGED    Details   HYDROcodone-acetaminophen (NORCO) 5-325 MG per tablet Take 1-2 tablets by mouth every 6 hours as needed for painNormal, Disp-30 tablet, R-0      allopurinol (ZYLOPRIM) 300 MG tablet Take 1 tablet by mouth daily.Eprescribe, Disp-30 tablet, R-1             Discharge Medication List as of 4/27/2018  5:15 AM          Past Medical History:   Diagnosis Date   • Essential (primary) hypertension        Past Surgical History:   Procedure Laterality Date   • CIRCUMCISION, PRIMARY N/A 04/26/2018   • NO PAST SURGERIES         No family history on file.    Social History   Substance Use Topics   • Smoking status: Never Smoker   • Smokeless tobacco: Never Used   • Alcohol use Yes      Comment: occasional       Review of Systems  As per Dr. Andrade  Physical Exam     ED Triage Vitals [04/27/18 0332]   ED Triage Vitals Group      Temp 98.7 °F (37.1 °C)      Pulse 67      Resp 18      /80      SpO2 99 %      EtCO2 mmHg       Height 6' 1\" (1.854 m)      Weight 238 lb (108 kg)      Weight Scale Used ED Actual       Physical Exam    Physical Exam    Vital Signs:    Visit Vitals  BP (!) 155/96 (Patient Position: Sitting)   Pulse 85   Temp 97.4 °F (36.3 °C) (Temporal Artery)   Resp 16   Ht 6' 1\" (1.854 m)   Wt 108 kg   SpO2 96%   BMI 31.40 kg/m²      Constitutional:  Patient is a well-developed, well-nourished adult male standing in examination room in no overt distress. Non-toxic appearing.   Head:  Normocephalic. Atraumatic.   Eyes: PER,  EOMIs..  Ears: Hearing intact bilaterally.    Pharynx: Posterior pharynx without erythema, or exudate. Uvula is midline. Patent airway.   Mouth: Moist mucous membranes. No ulcerations/lesions. No tongue or lip laceration appreciated. No dental fracture appreciated.  Dentition is appropriately aligned..  Nose: No deformity noted. No rhinorrhea. No epistaxis.  Neck: Normal range of motion. No tenderness. Supple. No stridor.   Cardiovascular:  Normal heart rate. Normal rhythm. No murmurs. No rubs. No gallops.    Respiratory: Lungs are clear to ascultation with symmetric breath sounds. No wheezing, rales or rhonchi.  GI:  Abdomen is soft, non-tender, nondistended.  No organomegaly or masses. No pulsatile mass.   : Patient with erythema bruising and some oozing blood around the base of the penis  Musculoskeletal:  No gross deformities noted. Full range of motion of all extremities without difficulty. No cyanosis. No clubbing. No edema of hands. Radial  pulses are intact bilateral and equal.   Back: No bony tenderness.   Skin: Warm, dry without rash.  Neurologic: Alert and oriented times 3.  No gross motor or sensory deficits.  Psych: Mood is normal, memory intact.      Results    No results found for this visit on 04/27/18.    No orders to display       DIAGNOSIS:   The encounter diagnosis was Postop check.    Assessment & Plan    Patient was postop complication. Dr. Andrade has evaluated the patient here in the ED and plan is made for transfer of the patient directly from the ED to the OR for definitive urologic care  Discharge Medication List as of 4/27/2018  5:15 AM          No follow-up provider specified.    cLINICALcOURSE   Patient is evaluated by Dr. Andrade here in the ED. Plan is made for transfer the patient from the ED to the OR for definitive urologic care    Closure:  Admit to OR, urology service    ED Course     Procedures      Radiology Results     Imaging Results    None         ED Medication Orders      None          MDM    Clinical Impression     ED Diagnosis   1. Postop check         Disposition        Admit 4/27/2018  4:11 AM  Patient accepted and admission order received from:: EULA DODGE [[id number]]  Patient Class: Emergency [3]  Patient on Telemetry: No  Has physician to physician communication occurred?: Yes  Transferring Patient to? Only adjust for transfers between [facility]: [facility] [[id number]]                  Adolfo Harvey MD  04/29/18 0608     There are no Wet Read(s) to document. There is 1 Wet Read(s) to document.

## 2023-12-04 NOTE — H&P ADULT - PROBLEM SELECTOR PLAN 4
Chronic, History of DM2  - Hold home medications  - Low dose insulin corrective scale  - Hypoglycemia protocol, Accuchecks AC&HS  - Diet regular food with DASH  - F/u HbA1c STAT Chronic, History of DM2  - Hold home medications  - Low dose insulin corrective scale  - Hypoglycemia protocol, Accuchecks AC&HS  - Diet regular food with DASH  - F/u HbA1c

## 2023-12-04 NOTE — CONSULT NOTE ADULT - ATTENDING COMMENTS
73 yoM with PMH of HTN, DM type 2 (on insulin), Hypothyroidism, and hyperlipidemia presents to the ED by wound care for evaluation of left 4th toe wound.    He has no cardiac complaints at this time.   EKG with sinus bradycardia, no ST changes. On BB at home.   BP uncontrolled in the ER, likely somewhat due to his pain.   Would continue home BB through the periop period  Needs BP control, can place on TID hydralazine PO for tighter control. Then can resume ARB and thiazide.   Once BP is more controlled, he has no cardiac contraindication to planned procedure.

## 2023-12-04 NOTE — H&P ADULT - PROBLEM SELECTOR PLAN 6
-chronic condition   -continue atorvastatin   f/u AM lipid panel -chronic condition   -continue atorvastatin   -f/u AM lipid panel

## 2023-12-05 LAB
A1C WITH ESTIMATED AVERAGE GLUCOSE RESULT: 9.5 % — HIGH (ref 4–5.6)
A1C WITH ESTIMATED AVERAGE GLUCOSE RESULT: 9.5 % — HIGH (ref 4–5.6)
ALBUMIN SERPL ELPH-MCNC: 3.2 G/DL — LOW (ref 3.3–5)
ALBUMIN SERPL ELPH-MCNC: 3.2 G/DL — LOW (ref 3.3–5)
ALP SERPL-CCNC: 122 U/L — HIGH (ref 40–120)
ALP SERPL-CCNC: 122 U/L — HIGH (ref 40–120)
ALT FLD-CCNC: 15 U/L — SIGNIFICANT CHANGE UP (ref 12–78)
ALT FLD-CCNC: 15 U/L — SIGNIFICANT CHANGE UP (ref 12–78)
ANION GAP SERPL CALC-SCNC: 10 MMOL/L — SIGNIFICANT CHANGE UP (ref 5–17)
ANION GAP SERPL CALC-SCNC: 10 MMOL/L — SIGNIFICANT CHANGE UP (ref 5–17)
APTT BLD: 31.3 SEC — SIGNIFICANT CHANGE UP (ref 24.5–35.6)
APTT BLD: 31.3 SEC — SIGNIFICANT CHANGE UP (ref 24.5–35.6)
AST SERPL-CCNC: 16 U/L — SIGNIFICANT CHANGE UP (ref 15–37)
AST SERPL-CCNC: 16 U/L — SIGNIFICANT CHANGE UP (ref 15–37)
BASOPHILS # BLD AUTO: 0.09 K/UL — SIGNIFICANT CHANGE UP (ref 0–0.2)
BASOPHILS # BLD AUTO: 0.09 K/UL — SIGNIFICANT CHANGE UP (ref 0–0.2)
BASOPHILS NFR BLD AUTO: 0.9 % — SIGNIFICANT CHANGE UP (ref 0–2)
BASOPHILS NFR BLD AUTO: 0.9 % — SIGNIFICANT CHANGE UP (ref 0–2)
BILIRUB SERPL-MCNC: 0.9 MG/DL — SIGNIFICANT CHANGE UP (ref 0.2–1.2)
BILIRUB SERPL-MCNC: 0.9 MG/DL — SIGNIFICANT CHANGE UP (ref 0.2–1.2)
BUN SERPL-MCNC: 11 MG/DL — SIGNIFICANT CHANGE UP (ref 7–23)
BUN SERPL-MCNC: 11 MG/DL — SIGNIFICANT CHANGE UP (ref 7–23)
CALCIUM SERPL-MCNC: 9.1 MG/DL — SIGNIFICANT CHANGE UP (ref 8.5–10.1)
CALCIUM SERPL-MCNC: 9.1 MG/DL — SIGNIFICANT CHANGE UP (ref 8.5–10.1)
CHLORIDE SERPL-SCNC: 97 MMOL/L — SIGNIFICANT CHANGE UP (ref 96–108)
CHLORIDE SERPL-SCNC: 97 MMOL/L — SIGNIFICANT CHANGE UP (ref 96–108)
CHOLEST SERPL-MCNC: 120 MG/DL — SIGNIFICANT CHANGE UP
CHOLEST SERPL-MCNC: 120 MG/DL — SIGNIFICANT CHANGE UP
CO2 SERPL-SCNC: 29 MMOL/L — SIGNIFICANT CHANGE UP (ref 22–31)
CO2 SERPL-SCNC: 29 MMOL/L — SIGNIFICANT CHANGE UP (ref 22–31)
CREAT SERPL-MCNC: 1.2 MG/DL — SIGNIFICANT CHANGE UP (ref 0.5–1.3)
CREAT SERPL-MCNC: 1.2 MG/DL — SIGNIFICANT CHANGE UP (ref 0.5–1.3)
EGFR: 63 ML/MIN/1.73M2 — SIGNIFICANT CHANGE UP
EGFR: 63 ML/MIN/1.73M2 — SIGNIFICANT CHANGE UP
EOSINOPHIL # BLD AUTO: 0.34 K/UL — SIGNIFICANT CHANGE UP (ref 0–0.5)
EOSINOPHIL # BLD AUTO: 0.34 K/UL — SIGNIFICANT CHANGE UP (ref 0–0.5)
EOSINOPHIL NFR BLD AUTO: 3.5 % — SIGNIFICANT CHANGE UP (ref 0–6)
EOSINOPHIL NFR BLD AUTO: 3.5 % — SIGNIFICANT CHANGE UP (ref 0–6)
ESTIMATED AVERAGE GLUCOSE: 226 MG/DL — HIGH (ref 68–114)
ESTIMATED AVERAGE GLUCOSE: 226 MG/DL — HIGH (ref 68–114)
GLUCOSE SERPL-MCNC: 182 MG/DL — HIGH (ref 70–99)
GLUCOSE SERPL-MCNC: 182 MG/DL — HIGH (ref 70–99)
GRAM STN FLD: ABNORMAL
HCT VFR BLD CALC: 39.7 % — SIGNIFICANT CHANGE UP (ref 39–50)
HCT VFR BLD CALC: 39.7 % — SIGNIFICANT CHANGE UP (ref 39–50)
HDLC SERPL-MCNC: 37 MG/DL — LOW
HDLC SERPL-MCNC: 37 MG/DL — LOW
HGB BLD-MCNC: 14 G/DL — SIGNIFICANT CHANGE UP (ref 13–17)
HGB BLD-MCNC: 14 G/DL — SIGNIFICANT CHANGE UP (ref 13–17)
IMM GRANULOCYTES NFR BLD AUTO: 0.7 % — SIGNIFICANT CHANGE UP (ref 0–0.9)
IMM GRANULOCYTES NFR BLD AUTO: 0.7 % — SIGNIFICANT CHANGE UP (ref 0–0.9)
INR BLD: 1.04 RATIO — SIGNIFICANT CHANGE UP (ref 0.85–1.18)
INR BLD: 1.04 RATIO — SIGNIFICANT CHANGE UP (ref 0.85–1.18)
LIPID PNL WITH DIRECT LDL SERPL: 62 MG/DL — SIGNIFICANT CHANGE UP
LIPID PNL WITH DIRECT LDL SERPL: 62 MG/DL — SIGNIFICANT CHANGE UP
LYMPHOCYTES # BLD AUTO: 1.72 K/UL — SIGNIFICANT CHANGE UP (ref 1–3.3)
LYMPHOCYTES # BLD AUTO: 1.72 K/UL — SIGNIFICANT CHANGE UP (ref 1–3.3)
LYMPHOCYTES # BLD AUTO: 17.8 % — SIGNIFICANT CHANGE UP (ref 13–44)
LYMPHOCYTES # BLD AUTO: 17.8 % — SIGNIFICANT CHANGE UP (ref 13–44)
MAGNESIUM SERPL-MCNC: 1.7 MG/DL — SIGNIFICANT CHANGE UP (ref 1.6–2.6)
MAGNESIUM SERPL-MCNC: 1.7 MG/DL — SIGNIFICANT CHANGE UP (ref 1.6–2.6)
MCHC RBC-ENTMCNC: 28.3 PG — SIGNIFICANT CHANGE UP (ref 27–34)
MCHC RBC-ENTMCNC: 28.3 PG — SIGNIFICANT CHANGE UP (ref 27–34)
MCHC RBC-ENTMCNC: 35.3 GM/DL — SIGNIFICANT CHANGE UP (ref 32–36)
MCHC RBC-ENTMCNC: 35.3 GM/DL — SIGNIFICANT CHANGE UP (ref 32–36)
MCV RBC AUTO: 80.2 FL — SIGNIFICANT CHANGE UP (ref 80–100)
MCV RBC AUTO: 80.2 FL — SIGNIFICANT CHANGE UP (ref 80–100)
MONOCYTES # BLD AUTO: 0.94 K/UL — HIGH (ref 0–0.9)
MONOCYTES # BLD AUTO: 0.94 K/UL — HIGH (ref 0–0.9)
MONOCYTES NFR BLD AUTO: 9.7 % — SIGNIFICANT CHANGE UP (ref 2–14)
MONOCYTES NFR BLD AUTO: 9.7 % — SIGNIFICANT CHANGE UP (ref 2–14)
MRSA PCR RESULT.: SIGNIFICANT CHANGE UP
MRSA PCR RESULT.: SIGNIFICANT CHANGE UP
NEUTROPHILS # BLD AUTO: 6.49 K/UL — SIGNIFICANT CHANGE UP (ref 1.8–7.4)
NEUTROPHILS # BLD AUTO: 6.49 K/UL — SIGNIFICANT CHANGE UP (ref 1.8–7.4)
NEUTROPHILS NFR BLD AUTO: 67.4 % — SIGNIFICANT CHANGE UP (ref 43–77)
NEUTROPHILS NFR BLD AUTO: 67.4 % — SIGNIFICANT CHANGE UP (ref 43–77)
NON HDL CHOLESTEROL: 82 MG/DL — SIGNIFICANT CHANGE UP
NON HDL CHOLESTEROL: 82 MG/DL — SIGNIFICANT CHANGE UP
NRBC # BLD: 0 /100 WBCS — SIGNIFICANT CHANGE UP (ref 0–0)
NRBC # BLD: 0 /100 WBCS — SIGNIFICANT CHANGE UP (ref 0–0)
PHOSPHATE SERPL-MCNC: 2.8 MG/DL — SIGNIFICANT CHANGE UP (ref 2.5–4.5)
PHOSPHATE SERPL-MCNC: 2.8 MG/DL — SIGNIFICANT CHANGE UP (ref 2.5–4.5)
PLATELET # BLD AUTO: 272 K/UL — SIGNIFICANT CHANGE UP (ref 150–400)
PLATELET # BLD AUTO: 272 K/UL — SIGNIFICANT CHANGE UP (ref 150–400)
POTASSIUM SERPL-MCNC: 4 MMOL/L — SIGNIFICANT CHANGE UP (ref 3.5–5.3)
POTASSIUM SERPL-MCNC: 4 MMOL/L — SIGNIFICANT CHANGE UP (ref 3.5–5.3)
POTASSIUM SERPL-SCNC: 4 MMOL/L — SIGNIFICANT CHANGE UP (ref 3.5–5.3)
POTASSIUM SERPL-SCNC: 4 MMOL/L — SIGNIFICANT CHANGE UP (ref 3.5–5.3)
PREALB SERPL-MCNC: 15 MG/DL — LOW (ref 20–40)
PREALB SERPL-MCNC: 15 MG/DL — LOW (ref 20–40)
PROT SERPL-MCNC: 7.6 G/DL — SIGNIFICANT CHANGE UP (ref 6–8.3)
PROT SERPL-MCNC: 7.6 G/DL — SIGNIFICANT CHANGE UP (ref 6–8.3)
PROTHROM AB SERPL-ACNC: 12.2 SEC — SIGNIFICANT CHANGE UP (ref 9.5–13)
PROTHROM AB SERPL-ACNC: 12.2 SEC — SIGNIFICANT CHANGE UP (ref 9.5–13)
RBC # BLD: 4.95 M/UL — SIGNIFICANT CHANGE UP (ref 4.2–5.8)
RBC # BLD: 4.95 M/UL — SIGNIFICANT CHANGE UP (ref 4.2–5.8)
RBC # FLD: 12.5 % — SIGNIFICANT CHANGE UP (ref 10.3–14.5)
RBC # FLD: 12.5 % — SIGNIFICANT CHANGE UP (ref 10.3–14.5)
S AUREUS DNA NOSE QL NAA+PROBE: DETECTED
S AUREUS DNA NOSE QL NAA+PROBE: DETECTED
SODIUM SERPL-SCNC: 136 MMOL/L — SIGNIFICANT CHANGE UP (ref 135–145)
SODIUM SERPL-SCNC: 136 MMOL/L — SIGNIFICANT CHANGE UP (ref 135–145)
SPECIMEN SOURCE: SIGNIFICANT CHANGE UP
TRIGL SERPL-MCNC: 107 MG/DL — SIGNIFICANT CHANGE UP
TRIGL SERPL-MCNC: 107 MG/DL — SIGNIFICANT CHANGE UP
TSH SERPL-MCNC: 2.01 UIU/ML — SIGNIFICANT CHANGE UP (ref 0.36–3.74)
TSH SERPL-MCNC: 2.01 UIU/ML — SIGNIFICANT CHANGE UP (ref 0.36–3.74)
WBC # BLD: 9.65 K/UL — SIGNIFICANT CHANGE UP (ref 3.8–10.5)
WBC # BLD: 9.65 K/UL — SIGNIFICANT CHANGE UP (ref 3.8–10.5)
WBC # FLD AUTO: 9.65 K/UL — SIGNIFICANT CHANGE UP (ref 3.8–10.5)
WBC # FLD AUTO: 9.65 K/UL — SIGNIFICANT CHANGE UP (ref 3.8–10.5)

## 2023-12-05 PROCEDURE — 88304 TISSUE EXAM BY PATHOLOGIST: CPT | Mod: 26

## 2023-12-05 PROCEDURE — 28112 PART REMOVAL OF METATARSAL: CPT | Mod: T3

## 2023-12-05 PROCEDURE — 99232 SBSQ HOSP IP/OBS MODERATE 35: CPT

## 2023-12-05 PROCEDURE — 88311 DECALCIFY TISSUE: CPT | Mod: 26

## 2023-12-05 PROCEDURE — 73630 X-RAY EXAM OF FOOT: CPT | Mod: 26,LT

## 2023-12-05 RX ORDER — LANOLIN ALCOHOL/MO/W.PET/CERES
3 CREAM (GRAM) TOPICAL AT BEDTIME
Refills: 0 | Status: DISCONTINUED | OUTPATIENT
Start: 2023-12-05 | End: 2023-12-08

## 2023-12-05 RX ORDER — DEXTROSE 50 % IN WATER 50 %
25 SYRINGE (ML) INTRAVENOUS ONCE
Refills: 0 | Status: DISCONTINUED | OUTPATIENT
Start: 2023-12-05 | End: 2023-12-08

## 2023-12-05 RX ORDER — HEPARIN SODIUM 5000 [USP'U]/ML
5000 INJECTION INTRAVENOUS; SUBCUTANEOUS EVERY 8 HOURS
Refills: 0 | Status: DISCONTINUED | OUTPATIENT
Start: 2023-12-06 | End: 2023-12-08

## 2023-12-05 RX ORDER — METOPROLOL TARTRATE 50 MG
25 TABLET ORAL DAILY
Refills: 0 | Status: DISCONTINUED | OUTPATIENT
Start: 2023-12-05 | End: 2023-12-08

## 2023-12-05 RX ORDER — ATORVASTATIN CALCIUM 80 MG/1
10 TABLET, FILM COATED ORAL AT BEDTIME
Refills: 0 | Status: DISCONTINUED | OUTPATIENT
Start: 2023-12-05 | End: 2023-12-08

## 2023-12-05 RX ORDER — ONDANSETRON 8 MG/1
4 TABLET, FILM COATED ORAL ONCE
Refills: 0 | Status: DISCONTINUED | OUTPATIENT
Start: 2023-12-05 | End: 2023-12-05

## 2023-12-05 RX ORDER — DEXTROSE 50 % IN WATER 50 %
12.5 SYRINGE (ML) INTRAVENOUS ONCE
Refills: 0 | Status: DISCONTINUED | OUTPATIENT
Start: 2023-12-05 | End: 2023-12-08

## 2023-12-05 RX ORDER — ACETAMINOPHEN 500 MG
650 TABLET ORAL EVERY 6 HOURS
Refills: 0 | Status: DISCONTINUED | OUTPATIENT
Start: 2023-12-05 | End: 2023-12-08

## 2023-12-05 RX ORDER — GLUCAGON INJECTION, SOLUTION 0.5 MG/.1ML
1 INJECTION, SOLUTION SUBCUTANEOUS ONCE
Refills: 0 | Status: DISCONTINUED | OUTPATIENT
Start: 2023-12-05 | End: 2023-12-08

## 2023-12-05 RX ORDER — INFLUENZA VIRUS VACCINE 15; 15; 15; 15 UG/.5ML; UG/.5ML; UG/.5ML; UG/.5ML
0.7 SUSPENSION INTRAMUSCULAR ONCE
Refills: 0 | Status: DISCONTINUED | OUTPATIENT
Start: 2023-12-05 | End: 2023-12-08

## 2023-12-05 RX ORDER — HYDROMORPHONE HYDROCHLORIDE 2 MG/ML
0.5 INJECTION INTRAMUSCULAR; INTRAVENOUS; SUBCUTANEOUS
Refills: 0 | Status: DISCONTINUED | OUTPATIENT
Start: 2023-12-05 | End: 2023-12-05

## 2023-12-05 RX ORDER — DEXTROSE 50 % IN WATER 50 %
15 SYRINGE (ML) INTRAVENOUS ONCE
Refills: 0 | Status: DISCONTINUED | OUTPATIENT
Start: 2023-12-05 | End: 2023-12-08

## 2023-12-05 RX ORDER — OXYCODONE HYDROCHLORIDE 5 MG/1
5 TABLET ORAL ONCE
Refills: 0 | Status: DISCONTINUED | OUTPATIENT
Start: 2023-12-05 | End: 2023-12-05

## 2023-12-05 RX ORDER — SODIUM CHLORIDE 9 MG/ML
1000 INJECTION, SOLUTION INTRAVENOUS
Refills: 0 | Status: DISCONTINUED | OUTPATIENT
Start: 2023-12-05 | End: 2023-12-05

## 2023-12-05 RX ORDER — INSULIN LISPRO 100/ML
VIAL (ML) SUBCUTANEOUS
Refills: 0 | Status: DISCONTINUED | OUTPATIENT
Start: 2023-12-05 | End: 2023-12-06

## 2023-12-05 RX ORDER — CEFAZOLIN SODIUM 1 G
2000 VIAL (EA) INJECTION EVERY 8 HOURS
Refills: 0 | Status: DISCONTINUED | OUTPATIENT
Start: 2023-12-05 | End: 2023-12-07

## 2023-12-05 RX ORDER — LEVOTHYROXINE SODIUM 125 MCG
50 TABLET ORAL DAILY
Refills: 0 | Status: DISCONTINUED | OUTPATIENT
Start: 2023-12-05 | End: 2023-12-08

## 2023-12-05 RX ORDER — LOSARTAN POTASSIUM 100 MG/1
100 TABLET, FILM COATED ORAL DAILY
Refills: 0 | Status: DISCONTINUED | OUTPATIENT
Start: 2023-12-05 | End: 2023-12-08

## 2023-12-05 RX ADMIN — Medication 100 MILLIGRAM(S): at 05:34

## 2023-12-05 RX ADMIN — Medication 25 MILLIGRAM(S): at 05:33

## 2023-12-05 RX ADMIN — Medication 50 MICROGRAM(S): at 05:33

## 2023-12-05 RX ADMIN — SODIUM CHLORIDE 75 MILLILITER(S): 9 INJECTION, SOLUTION INTRAVENOUS at 12:03

## 2023-12-05 RX ADMIN — ATORVASTATIN CALCIUM 10 MILLIGRAM(S): 80 TABLET, FILM COATED ORAL at 21:12

## 2023-12-05 RX ADMIN — Medication 1: at 07:45

## 2023-12-05 RX ADMIN — HEPARIN SODIUM 5000 UNIT(S): 5000 INJECTION INTRAVENOUS; SUBCUTANEOUS at 05:34

## 2023-12-05 RX ADMIN — Medication 1: at 16:27

## 2023-12-05 RX ADMIN — Medication 100 MILLIGRAM(S): at 21:11

## 2023-12-05 RX ADMIN — LOSARTAN POTASSIUM 100 MILLIGRAM(S): 100 TABLET, FILM COATED ORAL at 05:33

## 2023-12-05 RX ADMIN — Medication 100 MILLIGRAM(S): at 13:12

## 2023-12-05 NOTE — BRIEF OPERATIVE NOTE - NSICDXBRIEFPREOP_GEN_ALL_CORE_FT
PRE-OP DIAGNOSIS:  Osteomyelitis of toe of left foot 05-Dec-2023 11:31:55  Poonam Dobbs  Diabetic ulcer of left foot 05-Dec-2023 11:33:05  Pooanm Dobbs   PRE-OP DIAGNOSIS:  Osteomyelitis of toe of left foot 05-Dec-2023 11:31:55  Poonam Dobbs  Diabetic ulcer of left foot 05-Dec-2023 11:33:05  Poonam Dobbs

## 2023-12-05 NOTE — PATIENT PROFILE ADULT - FALL HARM RISK - UNIVERSAL INTERVENTIONS
Bed in lowest position, wheels locked, appropriate side rails in place/Call bell, personal items and telephone in reach/Instruct patient to call for assistance before getting out of bed or chair/Non-slip footwear when patient is out of bed/[place] to call system/Physically safe environment - no spills, clutter or unnecessary equipment/Purposeful Proactive Rounding/Room/bathroom lighting operational, light cord in reach Bed in lowest position, wheels locked, appropriate side rails in place/Call bell, personal items and telephone in reach/Instruct patient to call for assistance before getting out of bed or chair/Non-slip footwear when patient is out of bed/[place] to call system/Physically safe environment - no spills, clutter or unnecessary equipment/Purposeful Proactive Rounding/Room/bathroom lighting operational, light cord in reach

## 2023-12-05 NOTE — PROGRESS NOTE ADULT - SUBJECTIVE AND OBJECTIVE BOX
INTERVAL HPI/OVERNIGHT EVENTS:  Patient seen and examined at bedside after OR. States procedure went well without complications. States his foot feels well currently, denies any pain. Denies any chest pain, SOB, abd pain.     ROS: All other review of systems is negative unless indicated above.    MEDICATIONS  (STANDING):  atorvastatin 10 milliGRAM(s) Oral at bedtime  ceFAZolin   IVPB 2000 milliGRAM(s) IV Intermittent every 8 hours  dextrose 50% Injectable 12.5 Gram(s) IV Push once  dextrose 50% Injectable 25 Gram(s) IV Push once  dextrose 50% Injectable 25 Gram(s) IV Push once  glucagon  Injectable 1 milliGRAM(s) IntraMuscular once  hydrochlorothiazide 12.5 milliGRAM(s) Oral daily  influenza  Vaccine (HIGH DOSE) 0.7 milliLiter(s) IntraMuscular once  insulin lispro (ADMELOG) corrective regimen sliding scale   SubCutaneous three times a day before meals  levothyroxine 50 MICROGram(s) Oral daily  losartan 100 milliGRAM(s) Oral daily  metoprolol succinate ER 25 milliGRAM(s) Oral daily    MEDICATIONS  (PRN):  acetaminophen     Tablet .. 650 milliGRAM(s) Oral every 6 hours PRN Temp greater or equal to 38C (100.4F), Mild Pain (1 - 3)  dextrose Oral Gel 15 Gram(s) Oral once PRN Blood Glucose LESS THAN 70 milliGRAM(s)/deciliter  melatonin 3 milliGRAM(s) Oral at bedtime PRN Insomnia      Allergies    No Known Allergies    Intolerances        Vital Signs Last 24 Hrs  T(C): 36.3 (05 Dec 2023 12:35), Max: 36.9 (04 Dec 2023 21:00)  T(F): 97.3 (05 Dec 2023 12:35), Max: 98.5 (05 Dec 2023 05:37)  HR: 52 (05 Dec 2023 12:35) (51 - 88)  BP: 160/77 (05 Dec 2023 12:35) (128/77 - 200/86)  BP(mean): --  RR: 14 (05 Dec 2023 12:35) (14 - 19)  SpO2: 98% (05 Dec 2023 12:35) (94% - 100%)    Parameters below as of 05 Dec 2023 11:35  Patient On (Oxygen Delivery Method): room air        Physical Exam:  General: laying comfortably in bed, NAD  Neurology: A&Ox3, nonfocal, MADRID x 4  Respiratory: CTA B/L  CV: RRR, S1S2, no murmurs, rubs or gallops  Abdominal: Soft, NT, ND +BS  Extremities: L foot wrapped in gauze- dressing C/D/I, + peripheral pulses      LABS:                        14.0   9.65  )-----------( 272      ( 05 Dec 2023 08:00 )             39.7     12-05    136  |  97  |  11  ----------------------------<  182<H>  4.0   |  29  |  1.20    Ca    9.1      05 Dec 2023 08:00  Phos  2.8     12-05  Mg     1.7     12-05    TPro  7.6  /  Alb  3.2<L>  /  TBili  0.9  /  DBili  x   /  AST  16  /  ALT  15  /  AlkPhos  122<H>  12-05    PT/INR - ( 05 Dec 2023 08:00 )   PT: 12.2 sec;   INR: 1.04 ratio         PTT - ( 05 Dec 2023 08:00 )  PTT:31.3 sec  Urinalysis Basic - ( 05 Dec 2023 08:00 )    Color: x / Appearance: x / SG: x / pH: x  Gluc: 182 mg/dL / Ketone: x  / Bili: x / Urobili: x   Blood: x / Protein: x / Nitrite: x   Leuk Esterase: x / RBC: x / WBC x   Sq Epi: x / Non Sq Epi: x / Bacteria: x        RADIOLOGY & ADDITIONAL TESTS:

## 2023-12-05 NOTE — BRIEF OPERATIVE NOTE - NSICDXBRIEFPOSTOP_GEN_ALL_CORE_FT
POST-OP DIAGNOSIS:  Toe osteomyelitis, left 05-Dec-2023 11:33:25  Poonam Dobbs  Diabetic ulcer of left foot 05-Dec-2023 11:33:42  Poonam Dobbs

## 2023-12-05 NOTE — PROGRESS NOTE ADULT - SUBJECTIVE AND OBJECTIVE BOX
[facility] -- Roland Khoury Pannella, Patel, Savella, Goodger: Office # [phone]    Follow Up:  Hypertension, post op    Subjective/Observations:     REVIEW OF SYSTEMS: All other review of systems are negative unless indicated above    PAST MEDICAL & SURGICAL HISTORY:  Hypertension      Diabetes mellitus      Hypothyroidism      Hyperlipidemia      No significant past surgical history          MEDICATIONS  (STANDING):  atorvastatin 10 milliGRAM(s) Oral at bedtime  ceFAZolin   IVPB 2000 milliGRAM(s) IV Intermittent every 8 hours  dextrose 50% Injectable 25 Gram(s) IV Push once  dextrose 50% Injectable 12.5 Gram(s) IV Push once  dextrose 50% Injectable 25 Gram(s) IV Push once  glucagon  Injectable 1 milliGRAM(s) IntraMuscular once  hydrochlorothiazide 12.5 milliGRAM(s) Oral daily  influenza  Vaccine (HIGH DOSE) 0.7 milliLiter(s) IntraMuscular once  insulin lispro (ADMELOG) corrective regimen sliding scale   SubCutaneous three times a day before meals  lactated ringers. 1000 milliLiter(s) (75 mL/Hr) IV Continuous <Continuous>  levothyroxine 50 MICROGram(s) Oral daily  losartan 100 milliGRAM(s) Oral daily  metoprolol succinate ER 25 milliGRAM(s) Oral daily    MEDICATIONS  (PRN):  acetaminophen     Tablet .. 650 milliGRAM(s) Oral every 6 hours PRN Temp greater or equal to 38C (100.4F), Mild Pain (1 - 3)  dextrose Oral Gel 15 Gram(s) Oral once PRN Blood Glucose LESS THAN 70 milliGRAM(s)/deciliter  HYDROmorphone  Injectable 0.5 milliGRAM(s) IV Push every 10 minutes PRN Severe Pain (7 - 10)  melatonin 3 milliGRAM(s) Oral at bedtime PRN Insomnia  ondansetron Injectable 4 milliGRAM(s) IV Push once PRN Nausea and/or Vomiting  oxyCODONE    IR 5 milliGRAM(s) Oral once PRN Moderate Pain (4 - 6)    Allergies    No Known Allergies    Intolerances      Vital Signs Last 24 Hrs  T(C): 36.3 (05 Dec 2023 12:35), Max: 36.9 (04 Dec 2023 21:00)  T(F): 97.3 (05 Dec 2023 12:35), Max: 98.5 (05 Dec 2023 05:37)  HR: 52 (05 Dec 2023 12:35) (51 - 88)  BP: 160/77 (05 Dec 2023 12:35) (128/77 - 200/86)  BP(mean): --  RR: 14 (05 Dec 2023 12:35) (14 - 19)  SpO2: 98% (05 Dec 2023 12:35) (94% - 100%)    Parameters below as of 05 Dec 2023 11:35  Patient On (Oxygen Delivery Method): room air      I&O's Summary    Weight (kg): 79.379 (12-05 @ 09:12)    TELE:   PHYSICAL EXAM:  Constitutional: NAD, awake and alert,   HEENT: Moist Mucous Membranes, Anicteric  Pulmonary: Non-labored, breath sounds are clear bilaterally, No wheezing, rales or rhonchi  Cardiovascular: Regular, S1 and S2, No murmurs, No rubs, gallops or clicks  Gastrointestinal:  soft, nontender, nondistended   Lymph: No peripheral edema. No lymphadenopathy.   Skin: No visible rashes or ulcers.  Psych:  Mood & affect appropriate      LABS: All Labs Reviewed:                        14.0   9.65  )-----------( 272      ( 05 Dec 2023 08:00 )             39.7                         14.2   11.96 )-----------( 312      ( 04 Dec 2023 12:57 )             41.4     05 Dec 2023 08:00    136    |  97     |  11     ----------------------------<  182    4.0     |  29     |  1.20   04 Dec 2023 12:57    136    |  98     |  13     ----------------------------<  192    4.1     |  26     |  1.20     Ca    9.1        05 Dec 2023 08:00  Ca    9.5        04 Dec 2023 12:57  Phos  2.8       05 Dec 2023 08:00  Mg     1.7       05 Dec 2023 08:00    TPro  7.6    /  Alb  3.2    /  TBili  0.9    /  DBili  x      /  AST  16     /  ALT  15     /  AlkPhos  122    05 Dec 2023 08:00  TPro  8.5    /  Alb  3.6    /  TBili  0.7    /  DBili  x      /  AST  19     /  ALT  21     /  AlkPhos  132    04 Dec 2023 12:57   LIVER FUNCTIONS - ( 05 Dec 2023 08:00 )  Alb: 3.2 g/dL / Pro: 7.6 g/dL / ALK PHOS: 122 U/L / ALT: 15 U/L / AST: 16 U/L / GGT: x           PT/INR - ( 05 Dec 2023 08:00 )   PT: 12.2 sec;   INR: 1.04 ratio         PTT - ( 05 Dec 2023 08:00 )  PTT:31.3 sec     [facility] -- Roland Khoury Pannella, Patel, Savella, Goodger: Office # [phone]    Follow Up:  Hypertension, post op    Subjective/Observations:     REVIEW OF SYSTEMS: All other review of systems are negative unless indicated above    PAST MEDICAL & SURGICAL HISTORY:  Hypertension      Diabetes mellitus      Hypothyroidism      Hyperlipidemia      No significant past surgical history          MEDICATIONS  (STANDING):  atorvastatin 10 milliGRAM(s) Oral at bedtime  ceFAZolin   IVPB 2000 milliGRAM(s) IV Intermittent every 8 hours  dextrose 50% Injectable 25 Gram(s) IV Push once  dextrose 50% Injectable 12.5 Gram(s) IV Push once  dextrose 50% Injectable 25 Gram(s) IV Push once  glucagon  Injectable 1 milliGRAM(s) IntraMuscular once  hydrochlorothiazide 12.5 milliGRAM(s) Oral daily  influenza  Vaccine (HIGH DOSE) 0.7 milliLiter(s) IntraMuscular once  insulin lispro (ADMELOG) corrective regimen sliding scale   SubCutaneous three times a day before meals  lactated ringers. 1000 milliLiter(s) (75 mL/Hr) IV Continuous <Continuous>  levothyroxine 50 MICROGram(s) Oral daily  losartan 100 milliGRAM(s) Oral daily  metoprolol succinate ER 25 milliGRAM(s) Oral daily    MEDICATIONS  (PRN):  acetaminophen     Tablet .. 650 milliGRAM(s) Oral every 6 hours PRN Temp greater or equal to 38C (100.4F), Mild Pain (1 - 3)  dextrose Oral Gel 15 Gram(s) Oral once PRN Blood Glucose LESS THAN 70 milliGRAM(s)/deciliter  HYDROmorphone  Injectable 0.5 milliGRAM(s) IV Push every 10 minutes PRN Severe Pain (7 - 10)  melatonin 3 milliGRAM(s) Oral at bedtime PRN Insomnia  ondansetron Injectable 4 milliGRAM(s) IV Push once PRN Nausea and/or Vomiting  oxyCODONE    IR 5 milliGRAM(s) Oral once PRN Moderate Pain (4 - 6)    Allergies    No Known Allergies    Intolerances      Vital Signs Last 24 Hrs  T(C): 36.3 (05 Dec 2023 12:35), Max: 36.9 (04 Dec 2023 21:00)  T(F): 97.3 (05 Dec 2023 12:35), Max: 98.5 (05 Dec 2023 05:37)  HR: 52 (05 Dec 2023 12:35) (51 - 88)  BP: 160/77 (05 Dec 2023 12:35) (128/77 - 200/86)  BP(mean): --  RR: 14 (05 Dec 2023 12:35) (14 - 19)  SpO2: 98% (05 Dec 2023 12:35) (94% - 100%)    Parameters below as of 05 Dec 2023 11:35  Patient On (Oxygen Delivery Method): room air      I&O's Summary    Weight (kg): 79.379 (12-05 @ 09:12)    TELE:   PHYSICAL EXAM:  Constitutional: NAD, awake and alert,   HEENT: Moist Mucous Membranes, Anicteric  Pulmonary: Non-labored, breath sounds are clear bilaterally, No wheezing, rales or rhonchi  Cardiovascular: Regular, S1 and S2, No murmurs, No rubs, gallops or clicks  Gastrointestinal:  soft, nontender, nondistended   Lymph: No peripheral edema. No lymphadenopathy.   Skin: No visible rashes or ulcers.  Psych:  Mood & affect appropriate      LABS: All Labs Reviewed:                        14.0   9.65  )-----------( 272      ( 05 Dec 2023 08:00 )             39.7                         14.2   11.96 )-----------( 312      ( 04 Dec 2023 12:57 )             41.4     05 Dec 2023 08:00    136    |  97     |  11     ----------------------------<  182    4.0     |  29     |  1.20   04 Dec 2023 12:57    136    |  98     |  13     ----------------------------<  192    4.1     |  26     |  1.20     Ca    9.1        05 Dec 2023 08:00  Ca    9.5        04 Dec 2023 12:57  Phos  2.8       05 Dec 2023 08:00  Mg     1.7       05 Dec 2023 08:00    TPro  7.6    /  Alb  3.2    /  TBili  0.9    /  DBili  x      /  AST  16     /  ALT  15     /  AlkPhos  122    05 Dec 2023 08:00  TPro  8.5    /  Alb  3.6    /  TBili  0.7    /  DBili  x      /  AST  19     /  ALT  21     /  AlkPhos  132    04 Dec 2023 12:57   LIVER FUNCTIONS - ( 05 Dec 2023 08:00 )  Alb: 3.2 g/dL / Pro: 7.6 g/dL / ALK PHOS: 122 U/L / ALT: 15 U/L / AST: 16 U/L / GGT: x           PT/INR - ( 05 Dec 2023 08:00 )   PT: 12.2 sec;   INR: 1.04 ratio         PTT - ( 05 Dec 2023 08:00 )  PTT:31.3 sec     [facility] -- Roland Khoury Pannella, Patel, Dominguez Goff: Office # [phone]    Follow Up:  Hypertension, post op    Subjective/Observations: Patient seen and examined. Patient alert awake, Denies chest pain palpitation dizziness, denies difficulty breathing , no orthopnea or PND noted. Tolerating room air      REVIEW OF SYSTEMS: All other review of systems are negative unless indicated above    PAST MEDICAL & SURGICAL HISTORY:  Hypertension      Diabetes mellitus      Hypothyroidism      Hyperlipidemia      No significant past surgical history          MEDICATIONS  (STANDING):  atorvastatin 10 milliGRAM(s) Oral at bedtime  ceFAZolin   IVPB 2000 milliGRAM(s) IV Intermittent every 8 hours  dextrose 50% Injectable 25 Gram(s) IV Push once  dextrose 50% Injectable 12.5 Gram(s) IV Push once  dextrose 50% Injectable 25 Gram(s) IV Push once  glucagon  Injectable 1 milliGRAM(s) IntraMuscular once  hydrochlorothiazide 12.5 milliGRAM(s) Oral daily  influenza  Vaccine (HIGH DOSE) 0.7 milliLiter(s) IntraMuscular once  insulin lispro (ADMELOG) corrective regimen sliding scale   SubCutaneous three times a day before meals  lactated ringers. 1000 milliLiter(s) (75 mL/Hr) IV Continuous <Continuous>  levothyroxine 50 MICROGram(s) Oral daily  losartan 100 milliGRAM(s) Oral daily  metoprolol succinate ER 25 milliGRAM(s) Oral daily    MEDICATIONS  (PRN):  acetaminophen     Tablet .. 650 milliGRAM(s) Oral every 6 hours PRN Temp greater or equal to 38C (100.4F), Mild Pain (1 - 3)  dextrose Oral Gel 15 Gram(s) Oral once PRN Blood Glucose LESS THAN 70 milliGRAM(s)/deciliter  HYDROmorphone  Injectable 0.5 milliGRAM(s) IV Push every 10 minutes PRN Severe Pain (7 - 10)  melatonin 3 milliGRAM(s) Oral at bedtime PRN Insomnia  ondansetron Injectable 4 milliGRAM(s) IV Push once PRN Nausea and/or Vomiting  oxyCODONE    IR 5 milliGRAM(s) Oral once PRN Moderate Pain (4 - 6)    Allergies    No Known Allergies    Intolerances      Vital Signs Last 24 Hrs  T(C): 36.3 (05 Dec 2023 12:35), Max: 36.9 (04 Dec 2023 21:00)  T(F): 97.3 (05 Dec 2023 12:35), Max: 98.5 (05 Dec 2023 05:37)  HR: 52 (05 Dec 2023 12:35) (51 - 88)  BP: 160/77 (05 Dec 2023 12:35) (128/77 - 200/86)  BP(mean): --  RR: 14 (05 Dec 2023 12:35) (14 - 19)  SpO2: 98% (05 Dec 2023 12:35) (94% - 100%)    Parameters below as of 05 Dec 2023 11:35  Patient On (Oxygen Delivery Method): room air      I&O's Summary    Weight (kg): 79.379 (12-05 @ 09:12)    TELE:   PHYSICAL EXAM:  Constitutional: NAD, awake and alert,   HEENT: Moist Mucous Membranes, Anicteric  Pulmonary: Non-labored, breath sounds are clear bilaterally, No wheezing, rales or rhonchi  Cardiovascular: Regular, S1 and S2, No murmurs, No rubs, gallops or clicks  Gastrointestinal:  soft, nontender, nondistended   Lymph: No peripheral edema. No lymphadenopathy.   Skin: No visible rashes or ulcers.  Psych:  Mood & affect appropriate      LABS: All Labs Reviewed:                        14.0   9.65  )-----------( 272      ( 05 Dec 2023 08:00 )             39.7                         14.2   11.96 )-----------( 312      ( 04 Dec 2023 12:57 )             41.4     05 Dec 2023 08:00    136    |  97     |  11     ----------------------------<  182    4.0     |  29     |  1.20   04 Dec 2023 12:57    136    |  98     |  13     ----------------------------<  192    4.1     |  26     |  1.20     Ca    9.1        05 Dec 2023 08:00  Ca    9.5        04 Dec 2023 12:57  Phos  2.8       05 Dec 2023 08:00  Mg     1.7       05 Dec 2023 08:00    TPro  7.6    /  Alb  3.2    /  TBili  0.9    /  DBili  x      /  AST  16     /  ALT  15     /  AlkPhos  122    05 Dec 2023 08:00  TPro  8.5    /  Alb  3.6    /  TBili  0.7    /  DBili  x      /  AST  19     /  ALT  21     /  AlkPhos  132    04 Dec 2023 12:57   LIVER FUNCTIONS - ( 05 Dec 2023 08:00 )  Alb: 3.2 g/dL / Pro: 7.6 g/dL / ALK PHOS: 122 U/L / ALT: 15 U/L / AST: 16 U/L / GGT: x           PT/INR - ( 05 Dec 2023 08:00 )   PT: 12.2 sec;   INR: 1.04 ratio         PTT - ( 05 Dec 2023 08:00 )  PTT:31.3 sec     [facility] -- Roland Khoury Pannella, Patel, Dominguez Goff: Office # [phone]    Follow Up:  Hypertension, post op    Subjective/Observations: Patient seen and examined. Patient alert awake, Denies chest pain palpitation dizziness, denies difficulty breathing , no orthopnea or PND noted. Tolerating room air      REVIEW OF SYSTEMS: All other review of systems are negative unless indicated above    PAST MEDICAL & SURGICAL HISTORY:  Hypertension      Diabetes mellitus      Hypothyroidism      Hyperlipidemia      No significant past surgical history          MEDICATIONS  (STANDING):  atorvastatin 10 milliGRAM(s) Oral at bedtime  ceFAZolin   IVPB 2000 milliGRAM(s) IV Intermittent every 8 hours  dextrose 50% Injectable 25 Gram(s) IV Push once  dextrose 50% Injectable 12.5 Gram(s) IV Push once  dextrose 50% Injectable 25 Gram(s) IV Push once  glucagon  Injectable 1 milliGRAM(s) IntraMuscular once  hydrochlorothiazide 12.5 milliGRAM(s) Oral daily  influenza  Vaccine (HIGH DOSE) 0.7 milliLiter(s) IntraMuscular once  insulin lispro (ADMELOG) corrective regimen sliding scale   SubCutaneous three times a day before meals  lactated ringers. 1000 milliLiter(s) (75 mL/Hr) IV Continuous <Continuous>  levothyroxine 50 MICROGram(s) Oral daily  losartan 100 milliGRAM(s) Oral daily  metoprolol succinate ER 25 milliGRAM(s) Oral daily    MEDICATIONS  (PRN):  acetaminophen     Tablet .. 650 milliGRAM(s) Oral every 6 hours PRN Temp greater or equal to 38C (100.4F), Mild Pain (1 - 3)  dextrose Oral Gel 15 Gram(s) Oral once PRN Blood Glucose LESS THAN 70 milliGRAM(s)/deciliter  HYDROmorphone  Injectable 0.5 milliGRAM(s) IV Push every 10 minutes PRN Severe Pain (7 - 10)  melatonin 3 milliGRAM(s) Oral at bedtime PRN Insomnia  ondansetron Injectable 4 milliGRAM(s) IV Push once PRN Nausea and/or Vomiting  oxyCODONE    IR 5 milliGRAM(s) Oral once PRN Moderate Pain (4 - 6)    Allergies    No Known Allergies    Intolerances      Vital Signs Last 24 Hrs  T(C): 36.3 (05 Dec 2023 12:35), Max: 36.9 (04 Dec 2023 21:00)  T(F): 97.3 (05 Dec 2023 12:35), Max: 98.5 (05 Dec 2023 05:37)  HR: 52 (05 Dec 2023 12:35) (51 - 88)  BP: 160/77 (05 Dec 2023 12:35) (128/77 - 200/86)  BP(mean): --  RR: 14 (05 Dec 2023 12:35) (14 - 19)  SpO2: 98% (05 Dec 2023 12:35) (94% - 100%)    Parameters below as of 05 Dec 2023 11:35  Patient On (Oxygen Delivery Method): room air      I&O's Summary    Weight (kg): 79.379 (12-05 @ 09:12)    TELE:   PHYSICAL EXAM:  Constitutional: NAD, awake and alert,   HEENT: Moist Mucous Membranes, Anicteric  Pulmonary: Non-labored, breath sounds are clear bilaterally, No wheezing, rales or rhonchi  Cardiovascular: Regular, S1 and S2, No murmurs, No rubs, gallops or clicks  Gastrointestinal:  soft, nontender, nondistended   Lymph: No peripheral edema. No lymphadenopathy.   Skin: No visible rashes or ulcers.  Psych:  Mood & affect appropriate      LABS: All Labs Reviewed:                        14.0   9.65  )-----------( 272      ( 05 Dec 2023 08:00 )             39.7                         14.2   11.96 )-----------( 312      ( 04 Dec 2023 12:57 )             41.4     05 Dec 2023 08:00    136    |  97     |  11     ----------------------------<  182    4.0     |  29     |  1.20   04 Dec 2023 12:57    136    |  98     |  13     ----------------------------<  192    4.1     |  26     |  1.20     Ca    9.1        05 Dec 2023 08:00  Ca    9.5        04 Dec 2023 12:57  Phos  2.8       05 Dec 2023 08:00  Mg     1.7       05 Dec 2023 08:00    TPro  7.6    /  Alb  3.2    /  TBili  0.9    /  DBili  x      /  AST  16     /  ALT  15     /  AlkPhos  122    05 Dec 2023 08:00  TPro  8.5    /  Alb  3.6    /  TBili  0.7    /  DBili  x      /  AST  19     /  ALT  21     /  AlkPhos  132    04 Dec 2023 12:57   LIVER FUNCTIONS - ( 05 Dec 2023 08:00 )  Alb: 3.2 g/dL / Pro: 7.6 g/dL / ALK PHOS: 122 U/L / ALT: 15 U/L / AST: 16 U/L / GGT: x           PT/INR - ( 05 Dec 2023 08:00 )   PT: 12.2 sec;   INR: 1.04 ratio         PTT - ( 05 Dec 2023 08:00 )  PTT:31.3 sec

## 2023-12-05 NOTE — PROGRESS NOTE ADULT - ASSESSMENT
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 trace LE edema  - No prior TTE available    - Pt hypertensive, BP uncontrolled , monitor routine hemodynamics.  - Continue home medications metoprolol succinate 25 mg, HCTZ 12.5 mg, and losartan 100 mg    - Monitor and replete lytes, keep K>4, Mg>2.  - Will continue to follow.    Marcos Cavanaugh, MS ANP, [facility]  Nurse Practitioner- Cardiology   Spectra #[phone] /[phone] 73 yoM with PMH of HTN, DM type 2 (on insulin), Hypothyroidism, and hyperlipidemia presents to the ED by wound care for evaluation of left 4th toe wound, now s/p S/P amputation of 4th toe and metatarsal head on 12/5    Patient denies chest pain at rest or exertion  - No clear evidence of acute ischemia  - EKG sinus bradycardia, rate 55  - no prior EKG or cardiac records available  - continue home atorvastatin 10 mg    - No meaningful evidence of volume overload. trace LE edema  - No prior TTE available    - Pt hypertensive, BP uncontrolled , monitor routine hemodynamics.  - Continue home medications metoprolol succinate 25 mg, HCTZ 12.5 mg, and losartan 100 mg    - Monitor and replete lytes, keep K>4, Mg>2.  - Will continue to follow.    Marcos Cavanaugh, MS ANP, [facility]  Nurse Practitioner- Cardiology   Spectra #[phone] /[phone] 73 yoM with PMH of HTN, DM type 2 (on insulin), Hypothyroidism, and hyperlipidemia presents to the ED by wound care for evaluation of left 4th toe wound, now s/p S/P amputation of 4th toe and metatarsal head on 12/5    Patient denies chest pain at rest or exertion  - No clear evidence of acute ischemia  - EKG sinus bradycardia, rate 55  - no prior EKG or cardiac records available  - continue home atorvastatin 10 mg    - No meaningful evidence of volume overload. trace LE edema  - No prior TTE available    - Pt hypertensive, SBP  190s in PCU, now 128  - monitor routine hemodynamics.  - Continue home medications metoprolol succinate 25 mg, HCTZ 12.5 mg, and losartan 100 mg      - Monitor and replete lytes, keep K>4, Mg>2.  - Will continue to follow.    MS ZAKIA Ross, [facility]  Nurse Practitioner- Cardiology   Spectra #[phone] /[phone] 73 yoM with PMH of HTN, DM type 2 (on insulin), Hypothyroidism, and hyperlipidemia presents to the ED by wound care for evaluation of left 4th toe wound, now s/p S/P amputation of 4th toe and metatarsal head on 12/5    Patient denies chest pain at rest or exertion  - No clear evidence of acute ischemia  - EKG sinus bradycardia, rate 55  - no prior EKG or cardiac records available  - continue home atorvastatin 10 mg    - No meaningful evidence of volume overload. trace LE edema  - No prior TTE available    - Pt hypertensive, SBP  190s in PCU, now 128  - monitor routine hemodynamics.  - Continue home medications metoprolol succinate 25 mg, HCTZ 12.5 mg, and losartan 100 mg      - Monitor and replete lytes, keep K>4, Mg>2.  - Will continue to follow.    MS ZAKIA Ross, [facility]  Nurse Practitioner- Cardiology   Spectra #[phone] /[phone]

## 2023-12-05 NOTE — BRIEF OPERATIVE NOTE - SPECIMENS
Left fourth toe bone culture and bone pathology and fourth metatarsal head bone culture and proximal margin

## 2023-12-05 NOTE — PROGRESS NOTE ADULT - ASSESSMENT
73 yoM with PMH of HTN, DM, Hypothyroidism, and hyperlipidemia presents to the ED by wound care for evaluation of infected left 4th toe.

## 2023-12-05 NOTE — BRIEF OPERATIVE NOTE - NSICDXBRIEFPROCEDURE_GEN_ALL_CORE_FT
PROCEDURES:  Amputation of toe and metatarsal bone of left foot 05-Dec-2023 11:30:29  Poonam Dobbs

## 2023-12-05 NOTE — PATIENT PROFILE ADULT - NSPROHMSYMPCOND_GEN_A_NUR
cardiovascular/diabetes

## 2023-12-05 NOTE — PROGRESS NOTE ADULT - SUBJECTIVE AND OBJECTIVE BOX
[facility]  INFECTIOUS DISEASES   [address] [place] [address]  Tel: [phone]     Fax: [phone]  ========================================================  MD Cedrick Sanz Kaushal, MD Cho, Michelle, MD Sunjit, Jaspal, MD  ========================================================    [id number]  VALENTIN CERON     Follow up: Left Foot wound     No new changes, no fever, for surgery today.     PAST MEDICAL & SURGICAL HISTORY:  Hypertension  Diabetes mellitus  Hypothyroidism  Hyperlipidemia  No significant past surgical history    Social Hx: No smoking, EtOH or drugs     FAMILY HISTORY:  No pertinent family history in first degree relatives    Allergies  No Known Allergies    Antibiotics:  MEDICATIONS  (STANDING):  atorvastatin 10 milliGRAM(s) Oral at bedtime  ceFAZolin   IVPB 2000 milliGRAM(s) IV Intermittent every 8 hours  dextrose 5%. 1000 milliLiter(s) (50 mL/Hr) IV Continuous <Continuous>  dextrose 5%. 1000 milliLiter(s) (100 mL/Hr) IV Continuous <Continuous>  dextrose 50% Injectable 12.5 Gram(s) IV Push once  dextrose 50% Injectable 25 Gram(s) IV Push once  dextrose 50% Injectable 25 Gram(s) IV Push once  glucagon  Injectable 1 milliGRAM(s) IntraMuscular once  heparin   Injectable 5000 Unit(s) SubCutaneous every 8 hours  hydrochlorothiazide 12.5 milliGRAM(s) Oral daily  insulin lispro (ADMELOG) corrective regimen sliding scale   SubCutaneous three times a day before meals  levothyroxine 50 MICROGram(s) Oral daily  losartan 100 milliGRAM(s) Oral daily  metoprolol succinate ER 25 milliGRAM(s) Oral daily    MEDICATIONS  (PRN):  acetaminophen     Tablet .. 650 milliGRAM(s) Oral every 6 hours PRN Temp greater or equal to 38C (100.4F), Mild Pain (1 - 3)  dextrose Oral Gel 15 Gram(s) Oral once PRN Blood Glucose LESS THAN 70 milliGRAM(s)/deciliter  melatonin 3 milliGRAM(s) Oral at bedtime PRN Insomnia     REVIEW OF SYSTEMS:  CONSTITUTIONAL:  No Fever or chills  HEENT:  No diplopia or blurred vision.  No sore throat or runny nose.  CARDIOVASCULAR:  No chest pain or SOB.  RESPIRATORY:  No cough, shortness of breath, PND or orthopnea.  GASTROINTESTINAL:  No nausea, vomiting or diarrhea.  GENITOURINARY:  No dysuria, frequency or urgency. No Blood in urine  MUSCULOSKELETAL:  no joint aches, no muscle pain  SKIN:  foot ulcer   NEUROLOGIC:  No paresthesias or weakness.  PSYCHIATRIC:  No disorder of thought or mood.  ENDOCRINE:  No heat or cold intolerance, polyuria or polydipsia.  HEMATOLOGICAL:  No easy bruising or bleeding.     Physical Exam:  Vital Signs Last 24 Hrs  T(C): 36.3 (05 Dec 2023 12:35), Max: 36.9 (04 Dec 2023 21:00)  T(F): 97.3 (05 Dec 2023 12:35), Max: 98.5 (05 Dec 2023 05:37)  HR: 52 (05 Dec 2023 12:35) (51 - 88)  BP: 160/77 (05 Dec 2023 12:35) (128/77 - 200/86)  BP(mean): --  RR: 14 (05 Dec 2023 12:35) (14 - 19)  SpO2: 98% (05 Dec 2023 12:35) (94% - 100%)  Parameters below as of 05 Dec 2023 11:35  Patient On (Oxygen Delivery Method): room air  GEN: NAD  HEENT: normocephalic and atraumatic. EOMI. PERRL.    NECK: Supple.  No lymphadenopathy   LUNGS: Clear to auscultation.  HEART: Regular rate and rhythm without murmur.  ABDOMEN: Soft, nontender, and nondistended.  Positive bowel sounds.    : No CVA tenderness  EXTREMITIES: Without edema.  NEUROLOGIC: grossly intact.  PSYCHIATRIC: Appropriate affect .  SKIN: lateral side of left 4th toe with an open ulcer and priwound erythema with some   serosanguinous discharge     Labs:                        14.0   9.65  )-----------( 272      ( 05 Dec 2023 08:00 )             39.7     12-05    136  |  97  |  11  ----------------------------<  182<H>  4.0   |  29  |  1.20    Ca    9.1      05 Dec 2023 08:00  Phos  2.8     12-05  Mg     1.7     12-05    TPro  7.6  /  Alb  3.2<L>  /  TBili  0.9  /  DBili  x   /  AST  16  /  ALT  15  /  AlkPhos  122<H>  12-05    WBC Count: 9.65 K/uL (12-05-23 @ 08:00)  WBC Count: 11.96 K/uL (12-04-23 @ 12:57)    Creatinine: 1.20 mg/dL (12-05-23 @ 08:00)  Creatinine: 1.20 mg/dL (12-04-23 @ 12:57)    C-Reactive Protein, Serum: 38 mg/L (12-04-23 @ 12:57)    Sedimentation Rate, Erythrocyte: 74 mm/hr (12-04-23 @ 12:57)    All imaging and other data have been reviewed.  < from: Xray Chest 2 Views PA/Lat (12.04.23 @ 13:45) >  IMPRESSION:  No acute infiltrate.    < from: MR Foot w/wo IV Cont, Left (10.09.23 @ 10:29) >  IMPRESSION:  1.  Plantar soft tissue wound is present at the level of the 1st   metatarsophalangeal joint without underlying osteomyelitis.  2.  Changes of the 4th proximal and middle phalanges are concerning for   osteomyelitis in the appropriate setting.  3.  Confluent edema involving the plantar subcutaneous tissues of the 4th   toe may reflect early phlegmon formation without discrete abscess.      Assessment and Plan:   74 yo man with PMH of HTN, DM2, and Hypothyroidism was admitted with an ulcer in left 4th toe. He has chronic pain to the left foot that started 2-3 months ago and has progressively worsened. Pt states that he could see the bone, follows with wound care who recommended MRI and hospitalization.     # Left 4th toe ulcer   - Will follow cultures   - The old culture with MSSA  - Continue cefazolin for now   - S/P amputation of 4th toe and metatarsal head this morning  - Based on culture and pathology will decide about the treatment regimen and length    Thank you for courtesy of this consult.     Will follow.  Discussed with the primary team.     Roverto Rincon MD  Division of Infectious Diseases   Please call ID service at [phone] with any question.    75 minutes spent on total encounter assessing patient, examination, chart review, counseling and coordinating care by the attending physician/nurse/care manager.   [facility]  INFECTIOUS DISEASES   [address] [place] [address]  Tel: [phone]     Fax: [phone]  ========================================================  MD Cedrick Sanz Kaushal, MD Cho, Michelle, MD Sunjit, Jaspal, MD  ========================================================    [id number]  VALENTIN CERON     Follow up: Left Foot wound     No new changes, no fever, for surgery today.     PAST MEDICAL & SURGICAL HISTORY:  Hypertension  Diabetes mellitus  Hypothyroidism  Hyperlipidemia  No significant past surgical history    Social Hx: No smoking, EtOH or drugs     FAMILY HISTORY:  No pertinent family history in first degree relatives    Allergies  No Known Allergies    Antibiotics:  MEDICATIONS  (STANDING):  atorvastatin 10 milliGRAM(s) Oral at bedtime  ceFAZolin   IVPB 2000 milliGRAM(s) IV Intermittent every 8 hours  dextrose 5%. 1000 milliLiter(s) (50 mL/Hr) IV Continuous <Continuous>  dextrose 5%. 1000 milliLiter(s) (100 mL/Hr) IV Continuous <Continuous>  dextrose 50% Injectable 12.5 Gram(s) IV Push once  dextrose 50% Injectable 25 Gram(s) IV Push once  dextrose 50% Injectable 25 Gram(s) IV Push once  glucagon  Injectable 1 milliGRAM(s) IntraMuscular once  heparin   Injectable 5000 Unit(s) SubCutaneous every 8 hours  hydrochlorothiazide 12.5 milliGRAM(s) Oral daily  insulin lispro (ADMELOG) corrective regimen sliding scale   SubCutaneous three times a day before meals  levothyroxine 50 MICROGram(s) Oral daily  losartan 100 milliGRAM(s) Oral daily  metoprolol succinate ER 25 milliGRAM(s) Oral daily    MEDICATIONS  (PRN):  acetaminophen     Tablet .. 650 milliGRAM(s) Oral every 6 hours PRN Temp greater or equal to 38C (100.4F), Mild Pain (1 - 3)  dextrose Oral Gel 15 Gram(s) Oral once PRN Blood Glucose LESS THAN 70 milliGRAM(s)/deciliter  melatonin 3 milliGRAM(s) Oral at bedtime PRN Insomnia     REVIEW OF SYSTEMS:  CONSTITUTIONAL:  No Fever or chills  HEENT:  No diplopia or blurred vision.  No sore throat or runny nose.  CARDIOVASCULAR:  No chest pain or SOB.  RESPIRATORY:  No cough, shortness of breath, PND or orthopnea.  GASTROINTESTINAL:  No nausea, vomiting or diarrhea.  GENITOURINARY:  No dysuria, frequency or urgency. No Blood in urine  MUSCULOSKELETAL:  no joint aches, no muscle pain  SKIN:  foot ulcer   NEUROLOGIC:  No paresthesias or weakness.  PSYCHIATRIC:  No disorder of thought or mood.  ENDOCRINE:  No heat or cold intolerance, polyuria or polydipsia.  HEMATOLOGICAL:  No easy bruising or bleeding.     Physical Exam:  Vital Signs Last 24 Hrs  T(C): 36.3 (05 Dec 2023 12:35), Max: 36.9 (04 Dec 2023 21:00)  T(F): 97.3 (05 Dec 2023 12:35), Max: 98.5 (05 Dec 2023 05:37)  HR: 52 (05 Dec 2023 12:35) (51 - 88)  BP: 160/77 (05 Dec 2023 12:35) (128/77 - 200/86)  BP(mean): --  RR: 14 (05 Dec 2023 12:35) (14 - 19)  SpO2: 98% (05 Dec 2023 12:35) (94% - 100%)  Parameters below as of 05 Dec 2023 11:35  Patient On (Oxygen Delivery Method): room air  GEN: NAD  HEENT: normocephalic and atraumatic. EOMI. PERRL.    NECK: Supple.  No lymphadenopathy   LUNGS: Clear to auscultation.  HEART: Regular rate and rhythm without murmur.  ABDOMEN: Soft, nontender, and nondistended.  Positive bowel sounds.    : No CVA tenderness  EXTREMITIES: Without edema.  NEUROLOGIC: grossly intact.  PSYCHIATRIC: Appropriate affect .  SKIN: lateral side of left 4th toe with an open ulcer and priwound erythema with some   serosanguinous discharge     Labs:                        14.0   9.65  )-----------( 272      ( 05 Dec 2023 08:00 )             39.7     12-05    136  |  97  |  11  ----------------------------<  182<H>  4.0   |  29  |  1.20    Ca    9.1      05 Dec 2023 08:00  Phos  2.8     12-05  Mg     1.7     12-05    TPro  7.6  /  Alb  3.2<L>  /  TBili  0.9  /  DBili  x   /  AST  16  /  ALT  15  /  AlkPhos  122<H>  12-05    WBC Count: 9.65 K/uL (12-05-23 @ 08:00)  WBC Count: 11.96 K/uL (12-04-23 @ 12:57)    Creatinine: 1.20 mg/dL (12-05-23 @ 08:00)  Creatinine: 1.20 mg/dL (12-04-23 @ 12:57)    C-Reactive Protein, Serum: 38 mg/L (12-04-23 @ 12:57)    Sedimentation Rate, Erythrocyte: 74 mm/hr (12-04-23 @ 12:57)    All imaging and other data have been reviewed.  < from: Xray Chest 2 Views PA/Lat (12.04.23 @ 13:45) >  IMPRESSION:  No acute infiltrate.    < from: MR Foot w/wo IV Cont, Left (10.09.23 @ 10:29) >  IMPRESSION:  1.  Plantar soft tissue wound is present at the level of the 1st   metatarsophalangeal joint without underlying osteomyelitis.  2.  Changes of the 4th proximal and middle phalanges are concerning for   osteomyelitis in the appropriate setting.  3.  Confluent edema involving the plantar subcutaneous tissues of the 4th   toe may reflect early phlegmon formation without discrete abscess.      Assessment and Plan:   74 yo man with PMH of HTN, DM2, and Hypothyroidism was admitted with an ulcer in left 4th toe. He has chronic pain to the left foot that started 2-3 months ago and has progressively worsened. Pt states that he could see the bone, follows with wound care who recommended MRI and hospitalization.     # Left 4th toe ulcer   - Will follow cultures   - The old culture with MSSA  - Continue cefazolin for now   - S/P amputation of 4th toe and metatarsal head this morning  - Based on culture and pathology will decide about the treatment regimen and length    Thank you for courtesy of this consult.     Will follow.  Discussed with the primary team.     Roverto Rincon MD  Division of Infectious Diseases   Please call ID service at [phone] with any question.    75 minutes spent on total encounter assessing patient, examination, chart review, counseling and coordinating care by the attending physician/nurse/care manager.

## 2023-12-05 NOTE — PROGRESS NOTE ADULT - PROBLEM SELECTOR PLAN 1
-sent in by wound care for evaluation for worsening left 4th toe wound  -WBC 11.96, afebrile, does not meet sepsis criteria at this time  -MRI: Plantar soft tissue wound present at the level of the first metatarsal phalangeal joint without underlying osteomyelitis   -s/p cefazolin 1000mg x1, Acetominophen 650mg  -podiatry consulted, f/u recommendations  -continue IV cefazolin  -ID consulted- Dr Rincon  -POD#0 s/p amputation of L fourth toe and metatarsal head

## 2023-12-06 DIAGNOSIS — M86.9 OSTEOMYELITIS, UNSPECIFIED: ICD-10-CM

## 2023-12-06 DIAGNOSIS — E11.9 TYPE 2 DIABETES MELLITUS WITHOUT COMPLICATIONS: ICD-10-CM

## 2023-12-06 DIAGNOSIS — E11.621 TYPE 2 DIABETES MELLITUS WITH FOOT ULCER: ICD-10-CM

## 2023-12-06 LAB
ALBUMIN SERPL ELPH-MCNC: 3 G/DL — LOW (ref 3.3–5)
ALBUMIN SERPL ELPH-MCNC: 3 G/DL — LOW (ref 3.3–5)
ALP SERPL-CCNC: 115 U/L — SIGNIFICANT CHANGE UP (ref 40–120)
ALP SERPL-CCNC: 115 U/L — SIGNIFICANT CHANGE UP (ref 40–120)
ALT FLD-CCNC: 12 U/L — SIGNIFICANT CHANGE UP (ref 12–78)
ALT FLD-CCNC: 12 U/L — SIGNIFICANT CHANGE UP (ref 12–78)
ANION GAP SERPL CALC-SCNC: 9 MMOL/L — SIGNIFICANT CHANGE UP (ref 5–17)
ANION GAP SERPL CALC-SCNC: 9 MMOL/L — SIGNIFICANT CHANGE UP (ref 5–17)
AST SERPL-CCNC: 10 U/L — LOW (ref 15–37)
AST SERPL-CCNC: 10 U/L — LOW (ref 15–37)
BASOPHILS # BLD AUTO: 0.06 K/UL — SIGNIFICANT CHANGE UP (ref 0–0.2)
BASOPHILS # BLD AUTO: 0.06 K/UL — SIGNIFICANT CHANGE UP (ref 0–0.2)
BASOPHILS NFR BLD AUTO: 0.7 % — SIGNIFICANT CHANGE UP (ref 0–2)
BASOPHILS NFR BLD AUTO: 0.7 % — SIGNIFICANT CHANGE UP (ref 0–2)
BILIRUB SERPL-MCNC: 0.7 MG/DL — SIGNIFICANT CHANGE UP (ref 0.2–1.2)
BILIRUB SERPL-MCNC: 0.7 MG/DL — SIGNIFICANT CHANGE UP (ref 0.2–1.2)
BUN SERPL-MCNC: 10 MG/DL — SIGNIFICANT CHANGE UP (ref 7–23)
BUN SERPL-MCNC: 10 MG/DL — SIGNIFICANT CHANGE UP (ref 7–23)
CALCIUM SERPL-MCNC: 9.2 MG/DL — SIGNIFICANT CHANGE UP (ref 8.5–10.1)
CALCIUM SERPL-MCNC: 9.2 MG/DL — SIGNIFICANT CHANGE UP (ref 8.5–10.1)
CHLORIDE SERPL-SCNC: 94 MMOL/L — LOW (ref 96–108)
CHLORIDE SERPL-SCNC: 94 MMOL/L — LOW (ref 96–108)
CO2 SERPL-SCNC: 30 MMOL/L — SIGNIFICANT CHANGE UP (ref 22–31)
CO2 SERPL-SCNC: 30 MMOL/L — SIGNIFICANT CHANGE UP (ref 22–31)
CREAT SERPL-MCNC: 1.2 MG/DL — SIGNIFICANT CHANGE UP (ref 0.5–1.3)
CREAT SERPL-MCNC: 1.2 MG/DL — SIGNIFICANT CHANGE UP (ref 0.5–1.3)
EGFR: 63 ML/MIN/1.73M2 — SIGNIFICANT CHANGE UP
EGFR: 63 ML/MIN/1.73M2 — SIGNIFICANT CHANGE UP
EOSINOPHIL # BLD AUTO: 0.2 K/UL — SIGNIFICANT CHANGE UP (ref 0–0.5)
EOSINOPHIL # BLD AUTO: 0.2 K/UL — SIGNIFICANT CHANGE UP (ref 0–0.5)
EOSINOPHIL NFR BLD AUTO: 2.2 % — SIGNIFICANT CHANGE UP (ref 0–6)
EOSINOPHIL NFR BLD AUTO: 2.2 % — SIGNIFICANT CHANGE UP (ref 0–6)
GLUCOSE SERPL-MCNC: 252 MG/DL — HIGH (ref 70–99)
GLUCOSE SERPL-MCNC: 252 MG/DL — HIGH (ref 70–99)
HCT VFR BLD CALC: 37.7 % — LOW (ref 39–50)
HCT VFR BLD CALC: 37.7 % — LOW (ref 39–50)
HCV AB S/CO SERPL IA: 0.07 S/CO — SIGNIFICANT CHANGE UP (ref 0–0.99)
HCV AB S/CO SERPL IA: 0.07 S/CO — SIGNIFICANT CHANGE UP (ref 0–0.99)
HCV AB SERPL-IMP: SIGNIFICANT CHANGE UP
HCV AB SERPL-IMP: SIGNIFICANT CHANGE UP
HGB BLD-MCNC: 12.9 G/DL — LOW (ref 13–17)
HGB BLD-MCNC: 12.9 G/DL — LOW (ref 13–17)
IMM GRANULOCYTES NFR BLD AUTO: 0.3 % — SIGNIFICANT CHANGE UP (ref 0–0.9)
IMM GRANULOCYTES NFR BLD AUTO: 0.3 % — SIGNIFICANT CHANGE UP (ref 0–0.9)
LYMPHOCYTES # BLD AUTO: 1.48 K/UL — SIGNIFICANT CHANGE UP (ref 1–3.3)
LYMPHOCYTES # BLD AUTO: 1.48 K/UL — SIGNIFICANT CHANGE UP (ref 1–3.3)
LYMPHOCYTES # BLD AUTO: 16.3 % — SIGNIFICANT CHANGE UP (ref 13–44)
LYMPHOCYTES # BLD AUTO: 16.3 % — SIGNIFICANT CHANGE UP (ref 13–44)
MCHC RBC-ENTMCNC: 27.6 PG — SIGNIFICANT CHANGE UP (ref 27–34)
MCHC RBC-ENTMCNC: 27.6 PG — SIGNIFICANT CHANGE UP (ref 27–34)
MCHC RBC-ENTMCNC: 34.2 GM/DL — SIGNIFICANT CHANGE UP (ref 32–36)
MCHC RBC-ENTMCNC: 34.2 GM/DL — SIGNIFICANT CHANGE UP (ref 32–36)
MCV RBC AUTO: 80.7 FL — SIGNIFICANT CHANGE UP (ref 80–100)
MCV RBC AUTO: 80.7 FL — SIGNIFICANT CHANGE UP (ref 80–100)
MONOCYTES # BLD AUTO: 1.01 K/UL — HIGH (ref 0–0.9)
MONOCYTES # BLD AUTO: 1.01 K/UL — HIGH (ref 0–0.9)
MONOCYTES NFR BLD AUTO: 11.1 % — SIGNIFICANT CHANGE UP (ref 2–14)
MONOCYTES NFR BLD AUTO: 11.1 % — SIGNIFICANT CHANGE UP (ref 2–14)
NEUTROPHILS # BLD AUTO: 6.29 K/UL — SIGNIFICANT CHANGE UP (ref 1.8–7.4)
NEUTROPHILS # BLD AUTO: 6.29 K/UL — SIGNIFICANT CHANGE UP (ref 1.8–7.4)
NEUTROPHILS NFR BLD AUTO: 69.4 % — SIGNIFICANT CHANGE UP (ref 43–77)
NEUTROPHILS NFR BLD AUTO: 69.4 % — SIGNIFICANT CHANGE UP (ref 43–77)
NRBC # BLD: 0 /100 WBCS — SIGNIFICANT CHANGE UP (ref 0–0)
NRBC # BLD: 0 /100 WBCS — SIGNIFICANT CHANGE UP (ref 0–0)
PLATELET # BLD AUTO: 290 K/UL — SIGNIFICANT CHANGE UP (ref 150–400)
PLATELET # BLD AUTO: 290 K/UL — SIGNIFICANT CHANGE UP (ref 150–400)
POTASSIUM SERPL-MCNC: 3.8 MMOL/L — SIGNIFICANT CHANGE UP (ref 3.5–5.3)
POTASSIUM SERPL-MCNC: 3.8 MMOL/L — SIGNIFICANT CHANGE UP (ref 3.5–5.3)
POTASSIUM SERPL-SCNC: 3.8 MMOL/L — SIGNIFICANT CHANGE UP (ref 3.5–5.3)
POTASSIUM SERPL-SCNC: 3.8 MMOL/L — SIGNIFICANT CHANGE UP (ref 3.5–5.3)
PROT SERPL-MCNC: 7.5 G/DL — SIGNIFICANT CHANGE UP (ref 6–8.3)
PROT SERPL-MCNC: 7.5 G/DL — SIGNIFICANT CHANGE UP (ref 6–8.3)
RBC # BLD: 4.67 M/UL — SIGNIFICANT CHANGE UP (ref 4.2–5.8)
RBC # BLD: 4.67 M/UL — SIGNIFICANT CHANGE UP (ref 4.2–5.8)
RBC # FLD: 12.6 % — SIGNIFICANT CHANGE UP (ref 10.3–14.5)
RBC # FLD: 12.6 % — SIGNIFICANT CHANGE UP (ref 10.3–14.5)
SODIUM SERPL-SCNC: 133 MMOL/L — LOW (ref 135–145)
SODIUM SERPL-SCNC: 133 MMOL/L — LOW (ref 135–145)
SURGICAL PATHOLOGY STUDY: SIGNIFICANT CHANGE UP
SURGICAL PATHOLOGY STUDY: SIGNIFICANT CHANGE UP
WBC # BLD: 9.07 K/UL — SIGNIFICANT CHANGE UP (ref 3.8–10.5)
WBC # BLD: 9.07 K/UL — SIGNIFICANT CHANGE UP (ref 3.8–10.5)
WBC # FLD AUTO: 9.07 K/UL — SIGNIFICANT CHANGE UP (ref 3.8–10.5)
WBC # FLD AUTO: 9.07 K/UL — SIGNIFICANT CHANGE UP (ref 3.8–10.5)

## 2023-12-06 PROCEDURE — 99232 SBSQ HOSP IP/OBS MODERATE 35: CPT

## 2023-12-06 PROCEDURE — 99024 POSTOP FOLLOW-UP VISIT: CPT

## 2023-12-06 PROCEDURE — 99254 IP/OBS CNSLTJ NEW/EST MOD 60: CPT

## 2023-12-06 RX ORDER — INSULIN LISPRO 100/ML
VIAL (ML) SUBCUTANEOUS AT BEDTIME
Refills: 0 | Status: DISCONTINUED | OUTPATIENT
Start: 2023-12-06 | End: 2023-12-08

## 2023-12-06 RX ORDER — INSULIN LISPRO 100/ML
VIAL (ML) SUBCUTANEOUS
Refills: 0 | Status: DISCONTINUED | OUTPATIENT
Start: 2023-12-06 | End: 2023-12-08

## 2023-12-06 RX ORDER — MUPIROCIN 20 MG/G
1 OINTMENT TOPICAL
Refills: 0 | Status: DISCONTINUED | OUTPATIENT
Start: 2023-12-06 | End: 2023-12-08

## 2023-12-06 RX ADMIN — HEPARIN SODIUM 5000 UNIT(S): 5000 INJECTION INTRAVENOUS; SUBCUTANEOUS at 22:01

## 2023-12-06 RX ADMIN — HEPARIN SODIUM 5000 UNIT(S): 5000 INJECTION INTRAVENOUS; SUBCUTANEOUS at 13:12

## 2023-12-06 RX ADMIN — LOSARTAN POTASSIUM 100 MILLIGRAM(S): 100 TABLET, FILM COATED ORAL at 05:34

## 2023-12-06 RX ADMIN — Medication 100 MILLIGRAM(S): at 13:12

## 2023-12-06 RX ADMIN — MUPIROCIN 1 APPLICATION(S): 20 OINTMENT TOPICAL at 17:01

## 2023-12-06 RX ADMIN — Medication 6: at 16:59

## 2023-12-06 RX ADMIN — Medication 50 MICROGRAM(S): at 05:34

## 2023-12-06 RX ADMIN — Medication 100 MILLIGRAM(S): at 05:34

## 2023-12-06 RX ADMIN — HEPARIN SODIUM 5000 UNIT(S): 5000 INJECTION INTRAVENOUS; SUBCUTANEOUS at 05:33

## 2023-12-06 RX ADMIN — Medication 25 MILLIGRAM(S): at 05:34

## 2023-12-06 RX ADMIN — Medication 1: at 07:45

## 2023-12-06 RX ADMIN — Medication 100 MILLIGRAM(S): at 22:01

## 2023-12-06 RX ADMIN — ATORVASTATIN CALCIUM 10 MILLIGRAM(S): 80 TABLET, FILM COATED ORAL at 22:01

## 2023-12-06 NOTE — CONSULT NOTE ADULT - SUBJECTIVE AND OBJECTIVE BOX
Patient is a 73y old  Male who presents with a chief complaint of Foot wound (06 Dec 2023 11:38)      Reason For Consult: dm2 uncontrolled    HPI:  73 yoM with PMH of HTN, DM type 2, Hypothyroidism, and hyperlipidemia presents to the ED by wound care for evaluation of infected left 4th toe. Pt states he has chronic pain to the left foot that started 2-3 months ago and has progressively worsened. Pt states that he could see the bone, but follows [facility] wound care who recommended ED evaluation. Pt endorses mild pain, but maintains ability to ambulate and sensation of the foot. Denies fever, chills, chest pain, palpitations, SOB, cough, abdominal pain, nausea, vomiting, diarrhea, constipation, urinary frequency, urgency, or dysuria, headaches, numbness, tingling. He recently travelled to the [facility] (returned 2 weeks ago). Denies any recent antibiotic use, or sick contacts. No other complaints at this time.    ED Course:   Vitals: BP: 192/77 , HR: 54 , Temp: 98.1, RR: 18 , SpO2: 99% on RA  Labs:  WBC 11.96 Glucose 192 Alkphos 132  CXR: no acute infiltrate  EKG: Sinus Bradycardia, 54 bpm  MRI: Plantar soft tissue wound present at the level of the first metatarsal phalangeal joint without underlying osteomyelitis   Received Cefazolin 1000 mg x1, Acetominophen 650 mg  (04 Dec 2023 15:13)      PAST MEDICAL & SURGICAL HISTORY:  Hypertension      Diabetes mellitus      Hypothyroidism      Hyperlipidemia      No significant past surgical history          FAMILY HISTORY:  No pertinent family history in first degree relatives          Social History:    MEDICATIONS  (STANDING):  atorvastatin 10 milliGRAM(s) Oral at bedtime  ceFAZolin   IVPB 2000 milliGRAM(s) IV Intermittent every 8 hours  dextrose 50% Injectable 25 Gram(s) IV Push once  dextrose 50% Injectable 12.5 Gram(s) IV Push once  dextrose 50% Injectable 25 Gram(s) IV Push once  glucagon  Injectable 1 milliGRAM(s) IntraMuscular once  heparin   Injectable 5000 Unit(s) SubCutaneous every 8 hours  hydrochlorothiazide 12.5 milliGRAM(s) Oral daily  influenza  Vaccine (HIGH DOSE) 0.7 milliLiter(s) IntraMuscular once  insulin lispro (ADMELOG) corrective regimen sliding scale   SubCutaneous three times a day before meals  insulin lispro (ADMELOG) corrective regimen sliding scale   SubCutaneous at bedtime  levothyroxine 50 MICROGram(s) Oral daily  losartan 100 milliGRAM(s) Oral daily  metoprolol succinate ER 25 milliGRAM(s) Oral daily    MEDICATIONS  (PRN):  acetaminophen     Tablet .. 650 milliGRAM(s) Oral every 6 hours PRN Temp greater or equal to 38C (100.4F), Mild Pain (1 - 3)  dextrose Oral Gel 15 Gram(s) Oral once PRN Blood Glucose LESS THAN 70 milliGRAM(s)/deciliter  melatonin 3 milliGRAM(s) Oral at bedtime PRN Insomnia        T(C): 36.6 (12-06-23 @ 12:29), Max: 36.9 (12-06-23 @ 04:57)  HR: 76 (12-06-23 @ 12:29) (48 - 76)  BP: 112/67 (12-06-23 @ 12:29) (112/67 - 175/82)  RR: 18 (12-06-23 @ 12:29) (18 - 19)  SpO2: 96% (12-06-23 @ 12:29) (95% - 98%)  Wt(kg): --    PHYSICAL EXAM:  GENERAL: NAD, well-groomed, well-developed  HEAD:  Atraumatic, Normocephalic  NECK: Supple, No JVD, Normal thyroid  CHEST/LUNG: Clear to percussion bilaterally; No rales, rhonchi, wheezing, or rubs  HEART: Regular rate and rhythm; No murmurs, rubs, or gallops  ABDOMEN: Soft, Nontender, Nondistended; Bowel sounds present  EXTREMITIES:  left foot dsg intact    CAPILLARY BLOOD GLUCOSE      POCT Blood Glucose.: 170 mg/dL (06 Dec 2023 12:18)  POCT Blood Glucose.: 179 mg/dL (06 Dec 2023 07:39)  POCT Blood Glucose.: 195 mg/dL (05 Dec 2023 21:25)  POCT Blood Glucose.: 186 mg/dL (05 Dec 2023 16:24)                            12.9   9.07  )-----------( 290      ( 06 Dec 2023 09:25 )             37.7       CMP:  12-06 @ 09:25  SGPT 12  Albumin 3.0   Alk Phos 115   Anion Gap 9   SGOT 10   Total Bili 0.7   BUN 10   Calcium Total 9.2   CO2 30   Chloride 94   Creatinine 1.20   eGFR if AA --   eGFR if non AA --   Glucose 252   Potassium 3.8   Protein 7.5   Sodium 133      Thyroid Function Tests:  12-05 @ 08:00 TSH 2.01 FreeT4 -- T3 -- Anti TPO -- Anti Thyroglobulin Ab -- TSI --      Diabetes Tests:       Radiology:                  Patient is a 73y old  Male who presents with a chief complaint of Foot wound (06 Dec 2023 11:38)      Reason For Consult: dm2 uncontrolled    HPI:  73 yoM with PMH of HTN, DM type 2, Hypothyroidism, and hyperlipidemia presents to the ED by wound care for evaluation of infected left 4th toe. Pt states he has chronic pain to the left foot that started 2-3 months ago and has progressively worsened. Pt states that he could see the bone, but follows [facility] wound care who recommended ED evaluation. Pt endorses mild pain, but maintains ability to ambulate and sensation of the foot. Denies fever, chills, chest pain, palpitations, SOB, cough, abdominal pain, nausea, vomiting, diarrhea, constipation, urinary frequency, urgency, or dysuria, headaches, numbness, tingling. He recently travelled to the [facility] (returned 2 weeks ago). Denies any recent antibiotic use, or sick contacts. No other complaints at this time.    ED Course:   Vitals: BP: 192/77 , HR: 54 , Temp: 98.1, RR: 18 , SpO2: 99% on RA  Labs:  WBC 11.96 Glucose 192 Alkphos 132  CXR: no acute infiltrate  EKG: Sinus Bradycardia, 54 bpm  MRI: Plantar soft tissue wound present at the level of the first metatarsal phalangeal joint without underlying osteomyelitis   Received Cefazolin 1000 mg x1, Acetominophen 650 mg  (04 Dec 2023 15:13)      PAST MEDICAL & SURGICAL HISTORY:  Hypertension      Diabetes mellitus      Hypothyroidism      Hyperlipidemia      No significant past surgical history          FAMILY HISTORY:  No pertinent family history in first degree relatives          Social History:    MEDICATIONS  (STANDING):  atorvastatin 10 milliGRAM(s) Oral at bedtime  ceFAZolin   IVPB 2000 milliGRAM(s) IV Intermittent every 8 hours  dextrose 50% Injectable 25 Gram(s) IV Push once  dextrose 50% Injectable 12.5 Gram(s) IV Push once  dextrose 50% Injectable 25 Gram(s) IV Push once  glucagon  Injectable 1 milliGRAM(s) IntraMuscular once  heparin   Injectable 5000 Unit(s) SubCutaneous every 8 hours  hydrochlorothiazide 12.5 milliGRAM(s) Oral daily  influenza  Vaccine (HIGH DOSE) 0.7 milliLiter(s) IntraMuscular once  insulin lispro (ADMELOG) corrective regimen sliding scale   SubCutaneous three times a day before meals  insulin lispro (ADMELOG) corrective regimen sliding scale   SubCutaneous at bedtime  levothyroxine 50 MICROGram(s) Oral daily  losartan 100 milliGRAM(s) Oral daily  metoprolol succinate ER 25 milliGRAM(s) Oral daily    MEDICATIONS  (PRN):  acetaminophen     Tablet .. 650 milliGRAM(s) Oral every 6 hours PRN Temp greater or equal to 38C (100.4F), Mild Pain (1 - 3)  dextrose Oral Gel 15 Gram(s) Oral once PRN Blood Glucose LESS THAN 70 milliGRAM(s)/deciliter  melatonin 3 milliGRAM(s) Oral at bedtime PRN Insomnia        T(C): 36.6 (12-06-23 @ 12:29), Max: 36.9 (12-06-23 @ 04:57)  HR: 76 (12-06-23 @ 12:29) (48 - 76)  BP: 112/67 (12-06-23 @ 12:29) (112/67 - 175/82)  RR: 18 (12-06-23 @ 12:29) (18 - 19)  SpO2: 96% (12-06-23 @ 12:29) (95% - 98%)  Wt(kg): --    PHYSICAL EXAM:  GENERAL: NAD, well-groomed, well-developed  HEAD:  Atraumatic, Normocephalic  NECK: Supple, No JVD, Normal thyroid  CHEST/LUNG: Clear to percussion bilaterally; No rales, rhonchi, wheezing, or rubs  HEART: Regular rate and rhythm; No murmurs, rubs, or gallops  ABDOMEN: Soft, Nontender, Nondistended; Bowel sounds present  EXTREMITIES:  left foot dsg intact    CAPILLARY BLOOD GLUCOSE      POCT Blood Glucose.: 170 mg/dL (06 Dec 2023 12:18)  POCT Blood Glucose.: 179 mg/dL (06 Dec 2023 07:39)  POCT Blood Glucose.: 195 mg/dL (05 Dec 2023 21:25)  POCT Blood Glucose.: 186 mg/dL (05 Dec 2023 16:24)                            12.9   9.07  )-----------( 290      ( 06 Dec 2023 09:25 )             37.7       CMP:  12-06 @ 09:25  SGPT 12  Albumin 3.0   Alk Phos 115   Anion Gap 9   SGOT 10   Total Bili 0.7   BUN 10   Calcium Total 9.2   CO2 30   Chloride 94   Creatinine 1.20   eGFR if AA --   eGFR if non AA --   Glucose 252   Potassium 3.8   Protein 7.5   Sodium 133      Thyroid Function Tests:  12-05 @ 08:00 TSH 2.01 FreeT4 -- T3 -- Anti TPO -- Anti Thyroglobulin Ab -- TSI --      Diabetes Tests:       Radiology:

## 2023-12-06 NOTE — CONSULT NOTE ADULT - SUBJECTIVE AND OBJECTIVE BOX
Patient is a 73y old  Male who presents with a chief complaint of Foot wound (06 Dec 2023 10:04)    Type: DX year known complications Endocrine Last seen Rx home Hx DKA/HHS, Glucometer checks, needs, weight, diet, exercise  diabetes education provided  Hx ASCVD, CKD, HF    HPI:  73 yoM with PMH of HTN, DM type 2, Hypothyroidism, and hyperlipidemia presents to the ED by wound care for evaluation of infected left 4th toe. Pt states he has chronic pain to the left foot that started 2-3 months ago and has progressively worsened. Pt states that he could see the bone, but follows [facility] wound care who recommended ED evaluation. Pt endorses mild pain, but maintains ability to ambulate and sensation of the foot. Denies fever, chills, chest pain, palpitations, SOB, cough, abdominal pain, nausea, vomiting, diarrhea, constipation, urinary frequency, urgency, or dysuria, headaches, numbness, tingling. He recently travelled to the [facility] (returned 2 weeks ago). Denies any recent antibiotic use, or sick contacts. No other complaints at this time.    ED Course:   Vitals: BP: 192/77 , HR: 54 , Temp: 98.1, RR: 18 , SpO2: 99% on RA  Labs:  WBC 11.96 Glucose 192 Alkphos 132  CXR: no acute infiltrate  EKG: Sinus Bradycardia, 54 bpm  MRI: Plantar soft tissue wound present at the level of the first metatarsal phalangeal joint without underlying osteomyelitis   Received Cefazolin 1000 mg x1, Acetominophen 650 mg  (04 Dec 2023 15:13)      PAST MEDICAL & SURGICAL HISTORY:  Hypertension      Diabetes mellitus      Hypothyroidism      Hyperlipidemia      No significant past surgical history          Allergies    No Known Allergies    Intolerances        MEDICATIONS  (STANDING):  atorvastatin 10 milliGRAM(s) Oral at bedtime  ceFAZolin   IVPB 2000 milliGRAM(s) IV Intermittent every 8 hours  dextrose 50% Injectable 12.5 Gram(s) IV Push once  dextrose 50% Injectable 25 Gram(s) IV Push once  dextrose 50% Injectable 25 Gram(s) IV Push once  glucagon  Injectable 1 milliGRAM(s) IntraMuscular once  heparin   Injectable 5000 Unit(s) SubCutaneous every 8 hours  hydrochlorothiazide 12.5 milliGRAM(s) Oral daily  influenza  Vaccine (HIGH DOSE) 0.7 milliLiter(s) IntraMuscular once  insulin lispro (ADMELOG) corrective regimen sliding scale   SubCutaneous three times a day before meals  levothyroxine 50 MICROGram(s) Oral daily  losartan 100 milliGRAM(s) Oral daily  metoprolol succinate ER 25 milliGRAM(s) Oral daily       Patient is a 73y old  Male who presents with a chief complaint of Foot wound (06 Dec 2023 10:04)    Type: DX year known complications Endocrine Last seen Rx home Hx DKA/HHS, Glucometer checks, needs, weight, diet, exercise  diabetes education provided  Hx ASCVD, CKD, HF    HPI:  73 yoM with PMH of HTN, DM type 2, Hypothyroidism, and hyperlipidemia presents to the ED by wound care for evaluation of infected left 4th toe. Pt states he has chronic pain to the left foot that started 2-3 months ago and has progressively worsened. Pt states that he could see the bone, but follows [facility] wound care who recommended ED evaluation. Pt endorses mild pain, but maintains ability to ambulate and sensation of the foot. Denies fever, chills, chest pain, palpitations, SOB, cough, abdominal pain, nausea, vomiting, diarrhea, constipation, urinary frequency, urgency, or dysuria, headaches, numbness, tingling. He recently travelled to the [facility] (returned 2 weeks ago). Denies any recent antibiotic use, or sick contacts. No other complaints at this time.    ED Course:   Vitals: BP: 192/77 , HR: 54 , Temp: 98.1, RR: 18 , SpO2: 99% on RA  Labs:  WBC 11.96 Glucose 192 Alkphos 132  CXR: no acute infiltrate  EKG: Sinus Bradycardia, 54 bpm  MRI: Plantar soft tissue wound present at the level of the first metatarsal phalangeal joint without underlying osteomyelitis   Received Cefazolin 1000 mg x1, Acetominophen 650 mg  (04 Dec 2023 15:13)      PAST MEDICAL & SURGICAL HISTORY:  Hypertension      Diabetes mellitus      Hypothyroidism      Hyperlipidemia      No significant past surgical history          Allergies    No Known Allergies    Intolerances        MEDICATIONS  (STANDING):  atorvastatin 10 milliGRAM(s) Oral at bedtime  ceFAZolin   IVPB 2000 milliGRAM(s) IV Intermittent every 8 hours  dextrose 50% Injectable 12.5 Gram(s) IV Push once  dextrose 50% Injectable 25 Gram(s) IV Push once  dextrose 50% Injectable 25 Gram(s) IV Push once  glucagon  Injectable 1 milliGRAM(s) IntraMuscular once  heparin   Injectable 5000 Unit(s) SubCutaneous every 8 hours  hydrochlorothiazide 12.5 milliGRAM(s) Oral daily  influenza  Vaccine (HIGH DOSE) 0.7 milliLiter(s) IntraMuscular once  insulin lispro (ADMELOG) corrective regimen sliding scale   SubCutaneous three times a day before meals  levothyroxine 50 MICROGram(s) Oral daily  losartan 100 milliGRAM(s) Oral daily  metoprolol succinate ER 25 milliGRAM(s) Oral daily       Patient is a 73y old  Male who presents with a chief complaint of Foot wound (06 Dec 2023 10:04)    Type:2 DX x3 years ago. a1c 9.5% states last 2weeks ago 7.8%; feels the result here is an error- has PCP would not give her name/ and would not allow me to speak with her. States i have seen him before- no record of assessment in EMR by me. known complications: DFU.  No Endocrine- not interested in seeing one- has PCP to handle his diabetes- refused to try a sensor "will check with PCP". limited diabetes education provided- tried but not receptive- knows what to do. will try to follow up tomorrow.       HPI:  73 yoM with PMH of HTN, DM type 2, Hypothyroidism, and hyperlipidemia presents to the ED by wound care for evaluation of infected left 4th toe. Pt states he has chronic pain to the left foot that started 2-3 months ago and has progressively worsened. Pt states that he could see the bone, but follows [facility] wound care who recommended ED evaluation. Pt endorses mild pain, but maintains ability to ambulate and sensation of the foot. Denies fever, chills, chest pain, palpitations, SOB, cough, abdominal pain, nausea, vomiting, diarrhea, constipation, urinary frequency, urgency, or dysuria, headaches, numbness, tingling. He recently travelled to the [facility] (returned 2 weeks ago). Denies any recent antibiotic use, or sick contacts. No other complaints at this time.    ED Course:   Vitals: BP: 192/77 , HR: 54 , Temp: 98.1, RR: 18 , SpO2: 99% on RA  Labs:  WBC 11.96 Glucose 192 Alkphos 132  CXR: no acute infiltrate  EKG: Sinus Bradycardia, 54 bpm  MRI: Plantar soft tissue wound present at the level of the first metatarsal phalangeal joint without underlying osteomyelitis   Received Cefazolin 1000 mg x1, Acetominophen 650 mg  (04 Dec 2023 15:13)      PAST MEDICAL & SURGICAL HISTORY:  Hypertension      Diabetes mellitus      Hypothyroidism      Hyperlipidemia      No significant past surgical history          Allergies    No Known Allergies    Intolerances        MEDICATIONS  (STANDING):  atorvastatin 10 milliGRAM(s) Oral at bedtime  ceFAZolin   IVPB 2000 milliGRAM(s) IV Intermittent every 8 hours  dextrose 50% Injectable 12.5 Gram(s) IV Push once  dextrose 50% Injectable 25 Gram(s) IV Push once  dextrose 50% Injectable 25 Gram(s) IV Push once  glucagon  Injectable 1 milliGRAM(s) IntraMuscular once  heparin   Injectable 5000 Unit(s) SubCutaneous every 8 hours  hydrochlorothiazide 12.5 milliGRAM(s) Oral daily  influenza  Vaccine (HIGH DOSE) 0.7 milliLiter(s) IntraMuscular once  insulin lispro (ADMELOG) corrective regimen sliding scale   SubCutaneous three times a day before meals  levothyroxine 50 MICROGram(s) Oral daily  losartan 100 milliGRAM(s) Oral daily  metoprolol succinate ER 25 milliGRAM(s) Oral daily       Patient is a 73y old  Male who presents with a chief complaint of Foot wound (06 Dec 2023 10:04)    Type:2 DX x3 years ago. a1c 9.5% states last 2weeks ago 7.8%; feels the result here is an error- has PCP would not give her name/ and would not allow me to speak with her. States i have seen him before- no record of assessment in EMR by me. known complications: DFU.  No Endocrine- not interested in seeing one- has PCP to handle his diabetes- refused to try a sensor "will check with PCP". limited diabetes education provided- tried but not receptive- knows what to do. will try to follow up tomorrow.       HPI:  73 yoM with PMH of HTN, DM type 2, Hypothyroidism, and hyperlipidemia presents to the ED by wound care for evaluation of infected left 4th toe. Pt states he has chronic pain to the left foot that started 2-3 months ago and has progressively worsened. Pt states that he could see the bone, but follows [facility] wound care who recommended ED evaluation. Pt endorses mild pain, but maintains ability to ambulate and sensation of the foot. Denies fever, chills, chest pain, palpitations, SOB, cough, abdominal pain, nausea, vomiting, diarrhea, constipation, urinary frequency, urgency, or dysuria, headaches, numbness, tingling. He recently travelled to the [facility] (returned 2 weeks ago). Denies any recent antibiotic use, or sick contacts. No other complaints at this time.    ED Course:   Vitals: BP: 192/77 , HR: 54 , Temp: 98.1, RR: 18 , SpO2: 99% on RA  Labs:  WBC 11.96 Glucose 192 Alkphos 132  CXR: no acute infiltrate  EKG: Sinus Bradycardia, 54 bpm  MRI: Plantar soft tissue wound present at the level of the first metatarsal phalangeal joint without underlying osteomyelitis   Received Cefazolin 1000 mg x1, Acetominophen 650 mg  (04 Dec 2023 15:13)      PAST MEDICAL & SURGICAL HISTORY:  Hypertension      Diabetes mellitus      Hypothyroidism      Hyperlipidemia      No significant past surgical history          Allergies    No Known Allergies    Intolerances        MEDICATIONS  (STANDING):  atorvastatin 10 milliGRAM(s) Oral at bedtime  ceFAZolin   IVPB 2000 milliGRAM(s) IV Intermittent every 8 hours  dextrose 50% Injectable 12.5 Gram(s) IV Push once  dextrose 50% Injectable 25 Gram(s) IV Push once  dextrose 50% Injectable 25 Gram(s) IV Push once  glucagon  Injectable 1 milliGRAM(s) IntraMuscular once  heparin   Injectable 5000 Unit(s) SubCutaneous every 8 hours  hydrochlorothiazide 12.5 milliGRAM(s) Oral daily  influenza  Vaccine (HIGH DOSE) 0.7 milliLiter(s) IntraMuscular once  insulin lispro (ADMELOG) corrective regimen sliding scale   SubCutaneous three times a day before meals  levothyroxine 50 MICROGram(s) Oral daily  losartan 100 milliGRAM(s) Oral daily  metoprolol succinate ER 25 milliGRAM(s) Oral daily

## 2023-12-06 NOTE — CONSULT NOTE ADULT - ASSESSMENT
Physical Exam:   Vital Signs Last 24 Hrs  T(C): 36.9 (06 Dec 2023 04:57), Max: 36.9 (06 Dec 2023 04:57)  T(F): 98.4 (06 Dec 2023 04:57), Max: 98.4 (06 Dec 2023 04:57)  HR: 69 (06 Dec 2023 04:57) (48 - 69)  BP: 175/82 (06 Dec 2023 04:57) (134/69 - 175/82)  BP(mean): --  RR: 19 (06 Dec 2023 04:57) (14 - 19)  SpO2: 95% (06 Dec 2023 04:57) (95% - 99%)    Parameters below as of 06 Dec 2023 04:57  Patient On (Oxygen Delivery Method): room air             CAPILLARY BLOOD GLUCOSE      POCT Blood Glucose.: 179 mg/dL (06 Dec 2023 07:39)  POCT Blood Glucose.: 195 mg/dL (05 Dec 2023 21:25)  POCT Blood Glucose.: 186 mg/dL (05 Dec 2023 16:24)  POCT Blood Glucose.: 171 mg/dL (05 Dec 2023 11:25)      Cholesterol, Serum: 113 mg/dL (05.19.21 @ 08:36)     HDL Cholesterol, Serum: 22 mg/dL (05.19.21 @ 08:36)     LDL Cholesterol Calculated: 66 mg/dL (05.19.21 @ 08:36)     DIET: CC  >50%        
73 yoM with PMH of HTN, DM type 2 (on insulin), Hypothyroidism, and hyperlipidemia presents to the ED by wound care for evaluation of left 4th toe wound.  Cardiology consulted for cardiac clearance for debridement surgery on 12/5 or 12/6  Outpatient Cardiologist: unknown    - Patient denies chest pain at rest or exertion  - No clear evidence of acute ischemia  - EKG sinus bradycardia, rate 55  - no prior EKG or cardiac records available  - continue home atorvastatin 10 mg    - No meaningful evidence of volume overload. trace LE edema  - No prior TTE available    - Pt hypertensive, /77, with dizziness, monitor routine hemodynamics.  - Continue home medications metoprolol succinate 25 mg, HCTZ 12.5 mg, and losartan 100 mg  - Once blood pressure is controlled, then pt will be cleared for surgical procedure.    - Monitor and replete lytes, keep K>4, Mg>2.  - Strict I/Os, daily weights.    - RCRI score 1 point, class II risk, 6.0%  30 day risk of death, MI, or cardiac arrest.   - METS >4  - No prior hx of using anesthesia  - Pt has no active ischemia, decompensated heart failure, unstable arrythmia, or severe stenotic valvular disease, and has hypertension as cardiac risk factor. Once hypertension is controlled, then pt will be cleared for surgical procedure. In the setting of low risk debridement surgery, he is optimized from cardiovascular standpoint to proceed with planned procedure with routine hemodynamic monitoring.   - Other cardiovascular workup will depend on clinical course.  - All other workup per primary team.  - Will continue to follow.

## 2023-12-06 NOTE — PATIENT CHOICE NOTE. - NSPTCHOICESTATE_GEN_ALL_CORE
I have met with the patient and/or caregiver to discuss discharge goals and treatment plan. Patient and/or caregiver also provided with instructions on accessing the CMS Compare websites for additional information related to Post Acute Provider quality and resource use measures to assist them in evaluation of the providers and in selecting their post-acute provider of choice. Patient and caregiver were informed of the facilities that are owned and/or operated by [facility]. I have discussed with the patient the availability of in-network facilities and providers. Patient and caregiver provided with a list of post-acute providers whose services are appropriate to the discharge plans and patient needs.     For patient requiring durable medical equipment, patient and/or caregiver were informed that they have the right to request who provides the required equipment. I have met with the patient and/or caregiver to discuss discharge goals and treatment plan. Patient and/or caregiver also provided with instructions on accessing the CMS Compare websites for additional information related to Post Acute Provider quality and resource use measures to assist them in evaluation of the providers and in selecting their post-acute provider of choice. Patient and caregiver were informed of the facilities that are owned and/or operated by [facility]. I have discussed with the patient the availability of in-network facilities and providers. Patient and caregiver provided with a list of post-acute providers whose services are appropriate to the discharge plans and patient needs.     For patient requiring durable medical equipment, patient and/or caregiver were informed that they have the right to request who provides the required equipment.

## 2023-12-06 NOTE — CONSULT NOTE ADULT - PROBLEM SELECTOR RECOMMENDATION 9
Type  A1c % adm  Recommend endocrine-Perlman onconsult  FU appt: TBA  DSC recommendations: return to home regimen and glucose monitoring  diabetes education provided  Diabetes support info and cell # [phone] given   Goal 100-180 mg/dL; 140-180 mg/dL in critical care areas Type  A1c % adm  Recommend endocrine-Perlman onconsult  FU appt: TBA  DSC recommendations: return to home regimen and glucose monitoring  diabetes education provided  Diabetes support info and cell # [phone] given   Goal 100-180 mg/dL; 140-180 mg/dL in critical care areas Type 2 A1c 9.5% adm  Recommend endocrine-Perlman on consult  FU appt: TBA  DSC recommendations: return to home with insulin regimen and glucose monitoring  diabetes education provided  Diabetes support info and cell # [phone] given   Goal 100-180 mg/dL; 140-180 mg/dL in critical care areas Type 2 A1c 9.5% adm  Recommend endocrine-Perlman on consult  FU appt: TBA  DSC recommendations: return to home with insulin regimen and glucose monitoring  diabetes education provided  Diabetes support info and cell # [phone] given   Goal 100-180 mg/dL; 140-180 mg/dL in critical care areas Type 2 A1c 9.5% adm  Recommend endocrine-Perlman on consult  moderate scale AC and mod HS  FU appt: TBA  DSC recommendations: return to home with insulin regimen and glucose monitoring  diabetes education provided  Diabetes support info and cell # [phone] given   Goal 100-180 mg/dL; 140-180 mg/dL in critical care areas Type 2 A1c 9.5% adm  Recommend endocrine-Perlman on consult  moderate scale AC and mod HS  FU appt: TBA  DSC recommendations: return to home with insulin regimen and glucose monitoring  diabetes education provided  Diabetes support info and cell # [phone] given   Goal 100-180 mg/dL; 140-180 mg/dL in critical care areas

## 2023-12-06 NOTE — PROGRESS NOTE ADULT - PROBLEM SELECTOR PLAN 1
Patient examined and evaluated.  Surgical site dressed with  Aquacel and dry, sterile dressing.  Patient is to be partial weight bearing to the left heel  in a surgical shoe.  Surgical pathology and cultures pending at this time.  Antibiotics as per ID recommendations.  Patient will need home nursing for wound  care     Wound Care Instructions:  -Keep dressing clean, dry, and intact to the left  foot  -Change dressing to the left foot every other day  - Apply silver alginate and cover with sterile gauze, abd pad and neil   -Patient must remain partial weightbearing to the right heel in surgical shoe   -Monitor for any signs of infection including redness, swelling in the leg above the bandage, nausea/vomiting/fever/chills/chest pain/shortness of breath, if any are present patient must report to the emergency department immediately  -Patient is to follow up with Dr. Staton/Dr. Paz/ Dr. Dobbs  within 5 days after discharge at [facility] [place]. Please call to make an appointment [phone] Patient examined and evaluated.  Surgical site dressed with  Aquacel and dry, sterile dressing.  Patient is to be partial weight bearing to the left heel  in a surgical shoe.  Surgical pathology and cultures pending at this time.  Antibiotics as per ID recommendations.  Patient will need home nursing for wound  care     Wound Care Instructions:  -Keep dressing clean, dry, and intact to the left  foot  -Change dressing to the left foot every other day  - Apply silver alginate and cover with sterile gauze, abd pad and neil   -Patient must remain partial weightbearing to the right heel in surgical shoe   -Monitor for any signs of infection including redness, swelling in the leg above the bandage, nausea/vomiting/fever/chills/chest pain/shortness of breath, if any are present patient must report to the emergency department immediately  -Patient is to follow up with Dr. Staton/Dr. Paz/ Dr. Dobbs  within 5 days after discharge at [facility] [place]. Please call to make an appointment [phone]

## 2023-12-06 NOTE — PHYSICAL THERAPY INITIAL EVALUATION ADULT - PERTINENT HX OF CURRENT PROBLEM, REHAB EVAL
73 yoM with PMH of HTN, DM type 2 (on insulin), Hypothyroidism, and hyperlipidemia presents to the ED by wound care for evaluation of left 4th toe wound. He has chronic pain to the left foot that started 2-3 months ago and has progressively worsened. s/p amputation of 4th toe and metatarsal bone of left foot (12/5)

## 2023-12-06 NOTE — CONSULT NOTE ADULT - CONSULT REASON
Foot infection
dm2 uncontrolled
Left foot 4th digit wound with osteomyelitis
cardiac clearance
73y A1C with Estimated Average Glucose Result: 9.5 % (12-05-23 @ 08:00)  A1C with Estimated Average Glucose Result: 9.5 % (12-04-23 @ 12:57)   diabetes mellitus uncontrolled type 2

## 2023-12-06 NOTE — PROGRESS NOTE ADULT - PROBLEM SELECTOR PLAN 1
-sent in by wound care for evaluation for worsening left 4th toe wound  -WBC 11.96, afebrile, does not meet sepsis criteria at this time  -MRI: Plantar soft tissue wound present at the level of the first metatarsal phalangeal joint without underlying osteomyelitis   -s/p cefazolin 1000mg x1, Acetominophen 650mg  -podiatry consulted, f/u recommendations  -continue IV cefazolin  -ID consulted- Dr Rincon  -POD#1 s/p amputation of L fourth toe and metatarsal head, f/u path results

## 2023-12-06 NOTE — PROVIDER CONTACT NOTE (CRITICAL VALUE NOTIFICATION) - SITUATION
lab called RN to notify about 2 positive tissue cultures: 1. rare polymorphonuclear leukocytes per low power field and few gram + cocci in pairs per oil power field 2. rare polymorphonuclear leukocytes per low power field, numerous gram negative rods per oil power field, numerous gram positive cocci in pairs per oil power field and few gram positive rods per oil power field

## 2023-12-06 NOTE — PROGRESS NOTE ADULT - SUBJECTIVE AND OBJECTIVE BOX
[facility]   INFECTIOUS DISEASES   [address] [place] [address]  Tel: [phone]     Fax: [phone]  =========================================================  MD Cedrick Sanz Kaushal, MD Cho, Michelle, MD Sunjit, Jaspal, MD  =========================================================    VALENTIN CERON [id number]    Follow up: Left Foot wound     No new changes, no fever, no acute incidents overnight.  Pt states he is feeling very good and denies pain    Allergies:  No Known Allergies      Antibiotics:  acetaminophen     Tablet .. 650 milliGRAM(s) Oral every 6 hours PRN  atorvastatin 10 milliGRAM(s) Oral at bedtime  ceFAZolin   IVPB 2000 milliGRAM(s) IV Intermittent every 8 hours  dextrose 50% Injectable 12.5 Gram(s) IV Push once  dextrose 50% Injectable 25 Gram(s) IV Push once  dextrose 50% Injectable 25 Gram(s) IV Push once  dextrose Oral Gel 15 Gram(s) Oral once PRN  glucagon  Injectable 1 milliGRAM(s) IntraMuscular once  heparin   Injectable 5000 Unit(s) SubCutaneous every 8 hours  hydrochlorothiazide 12.5 milliGRAM(s) Oral daily  influenza  Vaccine (HIGH DOSE) 0.7 milliLiter(s) IntraMuscular once  insulin lispro (ADMELOG) corrective regimen sliding scale   SubCutaneous three times a day before meals  levothyroxine 50 MICROGram(s) Oral daily  losartan 100 milliGRAM(s) Oral daily  melatonin 3 milliGRAM(s) Oral at bedtime PRN  metoprolol succinate ER 25 milliGRAM(s) Oral daily       REVIEW OF SYSTEMS:  CONSTITUTIONAL:  No Fever or chills  HEENT:  No diplopia or blurred vision.  No sore throat or runny nose.  CARDIOVASCULAR:  No chest pain or SOB.  RESPIRATORY:  No cough, shortness of breath, PND or orthopnea.  GASTROINTESTINAL:  No nausea, vomiting or diarrhea.  GENITOURINARY:  No dysuria, frequency or urgency. No Blood in urine  MUSCULOSKELETAL:  no joint aches, no muscle pain  SKIN:  foot ulcer   NEUROLOGIC:  No paresthesias or weakness.  PSYCHIATRIC:  No disorder of thought or mood.  ENDOCRINE:  No heat or cold intolerance, polyuria or polydipsia.  HEMATOLOGICAL:  No easy bruising or bleeding.      Physical Exam:  ICU Vital Signs Last 24 Hrs  T(C): 36.9 (06 Dec 2023 04:57), Max: 36.9 (06 Dec 2023 04:57)  T(F): 98.4 (06 Dec 2023 04:57), Max: 98.4 (06 Dec 2023 04:57)  HR: 69 (06 Dec 2023 04:57) (48 - 69)  BP: 175/82 (06 Dec 2023 04:57) (134/69 - 175/82)  BP(mean): --  ABP: --  ABP(mean): --  RR: 19 (06 Dec 2023 04:57) (14 - 19)  SpO2: 95% (06 Dec 2023 04:57) (95% - 99%)    O2 Parameters below as of 06 Dec 2023 04:57  Patient On (Oxygen Delivery Method): room air           GEN: NAD  HEENT: normocephalic and atraumatic. EOMI. PERRL.    NECK: Supple.  No lymphadenopathy   LUNGS: Clear to auscultation.  HEART: Regular rate and rhythm without murmur.  ABDOMEN: Soft, nontender, and nondistended.  Positive bowel sounds.    : No CVA tenderness  EXTREMITIES: Without edema.  NEUROLOGIC: grossly intact.  PSYCHIATRIC: Appropriate affect .  SKIN: lateral side of left 4th toe with an open ulcer and priwound erythema with some   serosanguinous discharge     Labs:  12-05    136  |  97  |  11  ----------------------------<  182<H>  4.0   |  29  |  1.20    Ca    9.1      05 Dec 2023 08:00  Phos  2.8     12-05  Mg     1.7     12-05    TPro  7.6  /  Alb  3.2<L>  /  TBili  0.9  /  DBili  x   /  AST  16  /  ALT  15  /  AlkPhos  122<H>  12-05                          14.0   9.65  )-----------( 272      ( 05 Dec 2023 08:00 )             39.7     PT/INR - ( 05 Dec 2023 08:00 )   PT: 12.2 sec;   INR: 1.04 ratio         PTT - ( 05 Dec 2023 08:00 )  PTT:31.3 sec  Urinalysis Basic - ( 05 Dec 2023 08:00 )    Color: x / Appearance: x / SG: x / pH: x  Gluc: 182 mg/dL / Ketone: x  / Bili: x / Urobili: x   Blood: x / Protein: x / Nitrite: x   Leuk Esterase: x / RBC: x / WBC x   Sq Epi: x / Non Sq Epi: x / Bacteria: x      LIVER FUNCTIONS - ( 05 Dec 2023 08:00 )  Alb: 3.2 g/dL / Pro: 7.6 g/dL / ALK PHOS: 122 U/L / ALT: 15 U/L / AST: 16 U/L / GGT: x             RECENT CULTURES:  12-05 @ 11:00 .Tissue Other, LEFT FOOT 4TH TOE BONE CULTURE       Rare polymorphonuclear leukocytes per low power field  Numerous Gram Negative Rods per oil power field  Numerous Gram positive cocci in pairs per oil power field  Few Gram Positive Rods per oil power field      12-04 @ 13:00 .Blood Blood-Peripheral     No growth at 24 hours        12-04 @ 12:41 .Blood Blood-Peripheral     No growth at 24 hours              All imaging and data are reviewed.     Assessment and Plan:   72 yo man with PMH of HTN, DM2, and Hypothyroidism was admitted with an ulcer in left 4th toe. He has chronic pain to the left foot that started 2-3 months ago and has progressively worsened. Pt states that he could see the bone, follows with wound care who recommended MRI and hospitalization.     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  Discussed with the primary team.         Roverto Rincon MD  Division of Infectious Diseases   Please call ID service at [phone] with any question.      50 minutes spent on total encounter assessing patient, examination, chart review, counseling and coordinating care by the attending physician/nurse/care manager.   [facility]   INFECTIOUS DISEASES   [address] [place] [address]  Tel: [phone]     Fax: [phone]  =========================================================  MD Cedrick Sanz Kaushal, MD Cho, Michelle, MD Sunjit, Jaspal, MD  =========================================================    VALENTIN CERON [id number]    Follow up: Left Foot wound     No new changes, no fever, no acute incidents overnight.  Pt states he is feeling very good and denies pain    Allergies:  No Known Allergies      Antibiotics:  acetaminophen     Tablet .. 650 milliGRAM(s) Oral every 6 hours PRN  atorvastatin 10 milliGRAM(s) Oral at bedtime  ceFAZolin   IVPB 2000 milliGRAM(s) IV Intermittent every 8 hours  dextrose 50% Injectable 12.5 Gram(s) IV Push once  dextrose 50% Injectable 25 Gram(s) IV Push once  dextrose 50% Injectable 25 Gram(s) IV Push once  dextrose Oral Gel 15 Gram(s) Oral once PRN  glucagon  Injectable 1 milliGRAM(s) IntraMuscular once  heparin   Injectable 5000 Unit(s) SubCutaneous every 8 hours  hydrochlorothiazide 12.5 milliGRAM(s) Oral daily  influenza  Vaccine (HIGH DOSE) 0.7 milliLiter(s) IntraMuscular once  insulin lispro (ADMELOG) corrective regimen sliding scale   SubCutaneous three times a day before meals  levothyroxine 50 MICROGram(s) Oral daily  losartan 100 milliGRAM(s) Oral daily  melatonin 3 milliGRAM(s) Oral at bedtime PRN  metoprolol succinate ER 25 milliGRAM(s) Oral daily       REVIEW OF SYSTEMS:  CONSTITUTIONAL:  No Fever or chills  HEENT:  No diplopia or blurred vision.  No sore throat or runny nose.  CARDIOVASCULAR:  No chest pain or SOB.  RESPIRATORY:  No cough, shortness of breath, PND or orthopnea.  GASTROINTESTINAL:  No nausea, vomiting or diarrhea.  GENITOURINARY:  No dysuria, frequency or urgency. No Blood in urine  MUSCULOSKELETAL:  no joint aches, no muscle pain  SKIN:  foot ulcer   NEUROLOGIC:  No paresthesias or weakness.  PSYCHIATRIC:  No disorder of thought or mood.  ENDOCRINE:  No heat or cold intolerance, polyuria or polydipsia.  HEMATOLOGICAL:  No easy bruising or bleeding.      Physical Exam:  ICU Vital Signs Last 24 Hrs  T(C): 36.9 (06 Dec 2023 04:57), Max: 36.9 (06 Dec 2023 04:57)  T(F): 98.4 (06 Dec 2023 04:57), Max: 98.4 (06 Dec 2023 04:57)  HR: 69 (06 Dec 2023 04:57) (48 - 69)  BP: 175/82 (06 Dec 2023 04:57) (134/69 - 175/82)  BP(mean): --  ABP: --  ABP(mean): --  RR: 19 (06 Dec 2023 04:57) (14 - 19)  SpO2: 95% (06 Dec 2023 04:57) (95% - 99%)    O2 Parameters below as of 06 Dec 2023 04:57  Patient On (Oxygen Delivery Method): room air           GEN: NAD  HEENT: normocephalic and atraumatic. EOMI. PERRL.    NECK: Supple.  No lymphadenopathy   LUNGS: Clear to auscultation.  HEART: Regular rate and rhythm without murmur.  ABDOMEN: Soft, nontender, and nondistended.  Positive bowel sounds.    : No CVA tenderness  EXTREMITIES: Without edema.  NEUROLOGIC: grossly intact.  PSYCHIATRIC: Appropriate affect .  SKIN: lateral side of left 4th toe with an open ulcer and priwound erythema with some   serosanguinous discharge     Labs:  12-05    136  |  97  |  11  ----------------------------<  182<H>  4.0   |  29  |  1.20    Ca    9.1      05 Dec 2023 08:00  Phos  2.8     12-05  Mg     1.7     12-05    TPro  7.6  /  Alb  3.2<L>  /  TBili  0.9  /  DBili  x   /  AST  16  /  ALT  15  /  AlkPhos  122<H>  12-05                          14.0   9.65  )-----------( 272      ( 05 Dec 2023 08:00 )             39.7     PT/INR - ( 05 Dec 2023 08:00 )   PT: 12.2 sec;   INR: 1.04 ratio         PTT - ( 05 Dec 2023 08:00 )  PTT:31.3 sec  Urinalysis Basic - ( 05 Dec 2023 08:00 )    Color: x / Appearance: x / SG: x / pH: x  Gluc: 182 mg/dL / Ketone: x  / Bili: x / Urobili: x   Blood: x / Protein: x / Nitrite: x   Leuk Esterase: x / RBC: x / WBC x   Sq Epi: x / Non Sq Epi: x / Bacteria: x      LIVER FUNCTIONS - ( 05 Dec 2023 08:00 )  Alb: 3.2 g/dL / Pro: 7.6 g/dL / ALK PHOS: 122 U/L / ALT: 15 U/L / AST: 16 U/L / GGT: x             RECENT CULTURES:  12-05 @ 11:00 .Tissue Other, LEFT FOOT 4TH TOE BONE CULTURE       Rare polymorphonuclear leukocytes per low power field  Numerous Gram Negative Rods per oil power field  Numerous Gram positive cocci in pairs per oil power field  Few Gram Positive Rods per oil power field      12-04 @ 13:00 .Blood Blood-Peripheral     No growth at 24 hours        12-04 @ 12:41 .Blood Blood-Peripheral     No growth at 24 hours              All imaging and data are reviewed.     Assessment and Plan:   72 yo man with PMH of HTN, DM2, and Hypothyroidism was admitted with an ulcer in left 4th toe. He has chronic pain to the left foot that started 2-3 months ago and has progressively worsened. Pt states that he could see the bone, follows with wound care who recommended MRI and hospitalization.     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  Discussed with the primary team.         Roverto Rincon MD  Division of Infectious Diseases   Please call ID service at [phone] with any question.      50 minutes spent on total encounter assessing patient, examination, chart review, counseling and coordinating care by the attending physician/nurse/care manager.   [facility]   INFECTIOUS DISEASES   [address] [place] [address]  Tel: [phone]     Fax: [phone]  =========================================================  MD Cedrick Sanz Kaushal, MD Cho, Michelle, MD Sunjit, Jaspal, MD  =========================================================    VALENTIN CERON [id number]    Follow up: Left Foot wound     No new changes, no fever, no acute incidents overnight.  Pt states he is feeling very good and denies pain    Allergies:  No Known Allergies      Antibiotics:  acetaminophen     Tablet .. 650 milliGRAM(s) Oral every 6 hours PRN  atorvastatin 10 milliGRAM(s) Oral at bedtime  ceFAZolin   IVPB 2000 milliGRAM(s) IV Intermittent every 8 hours  dextrose 50% Injectable 12.5 Gram(s) IV Push once  dextrose 50% Injectable 25 Gram(s) IV Push once  dextrose 50% Injectable 25 Gram(s) IV Push once  dextrose Oral Gel 15 Gram(s) Oral once PRN  glucagon  Injectable 1 milliGRAM(s) IntraMuscular once  heparin   Injectable 5000 Unit(s) SubCutaneous every 8 hours  hydrochlorothiazide 12.5 milliGRAM(s) Oral daily  influenza  Vaccine (HIGH DOSE) 0.7 milliLiter(s) IntraMuscular once  insulin lispro (ADMELOG) corrective regimen sliding scale   SubCutaneous three times a day before meals  levothyroxine 50 MICROGram(s) Oral daily  losartan 100 milliGRAM(s) Oral daily  melatonin 3 milliGRAM(s) Oral at bedtime PRN  metoprolol succinate ER 25 milliGRAM(s) Oral daily       REVIEW OF SYSTEMS:  CONSTITUTIONAL:  No Fever or chills  HEENT:  No diplopia or blurred vision.  No sore throat or runny nose.  CARDIOVASCULAR:  No chest pain or SOB.  RESPIRATORY:  No cough, shortness of breath, PND or orthopnea.  GASTROINTESTINAL:  No nausea, vomiting or diarrhea.  GENITOURINARY:  No dysuria, frequency or urgency. No Blood in urine  MUSCULOSKELETAL:  no joint aches, no muscle pain  SKIN:  foot ulcer   NEUROLOGIC:  No paresthesias or weakness.  PSYCHIATRIC:  No disorder of thought or mood.  ENDOCRINE:  No heat or cold intolerance, polyuria or polydipsia.  HEMATOLOGICAL:  No easy bruising or bleeding.      Physical Exam:  ICU Vital Signs Last 24 Hrs  T(C): 36.9 (06 Dec 2023 04:57), Max: 36.9 (06 Dec 2023 04:57)  T(F): 98.4 (06 Dec 2023 04:57), Max: 98.4 (06 Dec 2023 04:57)  HR: 69 (06 Dec 2023 04:57) (48 - 69)  BP: 175/82 (06 Dec 2023 04:57) (134/69 - 175/82)  BP(mean): --  ABP: --  ABP(mean): --  RR: 19 (06 Dec 2023 04:57) (14 - 19)  SpO2: 95% (06 Dec 2023 04:57) (95% - 99%)    O2 Parameters below as of 06 Dec 2023 04:57  Patient On (Oxygen Delivery Method): room air           GEN: NAD  HEENT: normocephalic and atraumatic. EOMI. PERRL.    NECK: Supple.  No lymphadenopathy   LUNGS: Clear to auscultation.  HEART: Regular rate and rhythm without murmur.  ABDOMEN: Soft, nontender, and nondistended.  Positive bowel sounds.    : No CVA tenderness  EXTREMITIES: Without edema.  NEUROLOGIC: grossly intact.  PSYCHIATRIC: Appropriate affect .  SKIN: lateral side of left 4th toe with an open ulcer and priwound erythema with some   serosanguinous discharge     Labs:  12-05    136  |  97  |  11  ----------------------------<  182<H>  4.0   |  29  |  1.20    Ca    9.1      05 Dec 2023 08:00  Phos  2.8     12-05  Mg     1.7     12-05    TPro  7.6  /  Alb  3.2<L>  /  TBili  0.9  /  DBili  x   /  AST  16  /  ALT  15  /  AlkPhos  122<H>  12-05                          14.0   9.65  )-----------( 272      ( 05 Dec 2023 08:00 )             39.7     PT/INR - ( 05 Dec 2023 08:00 )   PT: 12.2 sec;   INR: 1.04 ratio         PTT - ( 05 Dec 2023 08:00 )  PTT:31.3 sec  Urinalysis Basic - ( 05 Dec 2023 08:00 )    Color: x / Appearance: x / SG: x / pH: x  Gluc: 182 mg/dL / Ketone: x  / Bili: x / Urobili: x   Blood: x / Protein: x / Nitrite: x   Leuk Esterase: x / RBC: x / WBC x   Sq Epi: x / Non Sq Epi: x / Bacteria: x      LIVER FUNCTIONS - ( 05 Dec 2023 08:00 )  Alb: 3.2 g/dL / Pro: 7.6 g/dL / ALK PHOS: 122 U/L / ALT: 15 U/L / AST: 16 U/L / GGT: x             RECENT CULTURES:  12-05 @ 11:00 .Tissue Other, LEFT FOOT 4TH TOE BONE CULTURE       Rare polymorphonuclear leukocytes per low power field  Numerous Gram Negative Rods per oil power field  Numerous Gram positive cocci in pairs per oil power field  Few Gram Positive Rods per oil power field      12-04 @ 13:00 .Blood Blood-Peripheral     No growth at 24 hours        12-04 @ 12:41 .Blood Blood-Peripheral     No growth at 24 hours              All imaging and data are reviewed.     Assessment and Plan:   72 yo man with PMH of HTN, DM2, and Hypothyroidism was admitted with an ulcer in left 4th toe. He has chronic pain to the left foot that started 2-3 months ago and has progressively worsened. Pt states that he could see the bone, follows with wound care who recommended MRI and hospitalization.     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  Discussed with the primary team.         Roverto Rincon MD  Division of Infectious Diseases   Please call ID service at [phone] with any question.      50 minutes spent on total encounter assessing patient, examination, chart review, counseling and coordinating care by the attending physician/nurse/care manager.   [facility]   INFECTIOUS DISEASES   [address] [place] [address]  Tel: [phone]     Fax: [phone]  =========================================================  MD Cedrick Sanz Kaushal, MD Cho, Michelle, MD Sunjit, Jaspal, MD  =========================================================    VALENTIN CERON [id number]    Follow up: Left Foot wound     No new changes, no fever, no acute incidents overnight.  Pt states he is feeling very good and denies pain    Allergies:  No Known Allergies      Antibiotics:  acetaminophen     Tablet .. 650 milliGRAM(s) Oral every 6 hours PRN  atorvastatin 10 milliGRAM(s) Oral at bedtime  ceFAZolin   IVPB 2000 milliGRAM(s) IV Intermittent every 8 hours  dextrose 50% Injectable 12.5 Gram(s) IV Push once  dextrose 50% Injectable 25 Gram(s) IV Push once  dextrose 50% Injectable 25 Gram(s) IV Push once  dextrose Oral Gel 15 Gram(s) Oral once PRN  glucagon  Injectable 1 milliGRAM(s) IntraMuscular once  heparin   Injectable 5000 Unit(s) SubCutaneous every 8 hours  hydrochlorothiazide 12.5 milliGRAM(s) Oral daily  influenza  Vaccine (HIGH DOSE) 0.7 milliLiter(s) IntraMuscular once  insulin lispro (ADMELOG) corrective regimen sliding scale   SubCutaneous three times a day before meals  levothyroxine 50 MICROGram(s) Oral daily  losartan 100 milliGRAM(s) Oral daily  melatonin 3 milliGRAM(s) Oral at bedtime PRN  metoprolol succinate ER 25 milliGRAM(s) Oral daily       REVIEW OF SYSTEMS:  CONSTITUTIONAL:  No Fever or chills  HEENT:  No diplopia or blurred vision.  No sore throat or runny nose.  CARDIOVASCULAR:  No chest pain or SOB.  RESPIRATORY:  No cough, shortness of breath, PND or orthopnea.  GASTROINTESTINAL:  No nausea, vomiting or diarrhea.  GENITOURINARY:  No dysuria, frequency or urgency. No Blood in urine  MUSCULOSKELETAL:  no joint aches, no muscle pain  SKIN:  foot ulcer   NEUROLOGIC:  No paresthesias or weakness.  PSYCHIATRIC:  No disorder of thought or mood.  ENDOCRINE:  No heat or cold intolerance, polyuria or polydipsia.  HEMATOLOGICAL:  No easy bruising or bleeding.      Physical Exam:  ICU Vital Signs Last 24 Hrs  T(C): 36.9 (06 Dec 2023 04:57), Max: 36.9 (06 Dec 2023 04:57)  T(F): 98.4 (06 Dec 2023 04:57), Max: 98.4 (06 Dec 2023 04:57)  HR: 69 (06 Dec 2023 04:57) (48 - 69)  BP: 175/82 (06 Dec 2023 04:57) (134/69 - 175/82)  BP(mean): --  ABP: --  ABP(mean): --  RR: 19 (06 Dec 2023 04:57) (14 - 19)  SpO2: 95% (06 Dec 2023 04:57) (95% - 99%)    O2 Parameters below as of 06 Dec 2023 04:57  Patient On (Oxygen Delivery Method): room air           GEN: NAD  HEENT: normocephalic and atraumatic. EOMI. PERRL.    NECK: Supple.  No lymphadenopathy   LUNGS: Clear to auscultation.  HEART: Regular rate and rhythm without murmur.  ABDOMEN: Soft, nontender, and nondistended.  Positive bowel sounds.    : No CVA tenderness  EXTREMITIES: Without edema.  NEUROLOGIC: grossly intact.  PSYCHIATRIC: Appropriate affect .  SKIN: lateral side of left 4th toe with an open ulcer and priwound erythema with some   serosanguinous discharge     Labs:  12-05    136  |  97  |  11  ----------------------------<  182<H>  4.0   |  29  |  1.20    Ca    9.1      05 Dec 2023 08:00  Phos  2.8     12-05  Mg     1.7     12-05    TPro  7.6  /  Alb  3.2<L>  /  TBili  0.9  /  DBili  x   /  AST  16  /  ALT  15  /  AlkPhos  122<H>  12-05                          14.0   9.65  )-----------( 272      ( 05 Dec 2023 08:00 )             39.7     PT/INR - ( 05 Dec 2023 08:00 )   PT: 12.2 sec;   INR: 1.04 ratio         PTT - ( 05 Dec 2023 08:00 )  PTT:31.3 sec  Urinalysis Basic - ( 05 Dec 2023 08:00 )    Color: x / Appearance: x / SG: x / pH: x  Gluc: 182 mg/dL / Ketone: x  / Bili: x / Urobili: x   Blood: x / Protein: x / Nitrite: x   Leuk Esterase: x / RBC: x / WBC x   Sq Epi: x / Non Sq Epi: x / Bacteria: x      LIVER FUNCTIONS - ( 05 Dec 2023 08:00 )  Alb: 3.2 g/dL / Pro: 7.6 g/dL / ALK PHOS: 122 U/L / ALT: 15 U/L / AST: 16 U/L / GGT: x             RECENT CULTURES:  12-05 @ 11:00 .Tissue Other, LEFT FOOT 4TH TOE BONE CULTURE       Rare polymorphonuclear leukocytes per low power field  Numerous Gram Negative Rods per oil power field  Numerous Gram positive cocci in pairs per oil power field  Few Gram Positive Rods per oil power field      12-04 @ 13:00 .Blood Blood-Peripheral     No growth at 24 hours        12-04 @ 12:41 .Blood Blood-Peripheral     No growth at 24 hours              All imaging and data are reviewed.     Assessment and Plan:   72 yo man with PMH of HTN, DM2, and Hypothyroidism was admitted with an ulcer in left 4th toe. He has chronic pain to the left foot that started 2-3 months ago and has progressively worsened. Pt states that he could see the bone, follows with wound care who recommended MRI and hospitalization.     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  Discussed with the primary team.         Roverto Rincon MD  Division of Infectious Diseases   Please call ID service at [phone] with any question.      50 minutes spent on total encounter assessing patient, examination, chart review, counseling and coordinating care by the attending physician/nurse/care manager.   [facility]   INFECTIOUS DISEASES   [address] [place] [address]  Tel: [phone]     Fax: [phone]  =========================================================  MD Cedrick Sanz Kaushal, MD Cho, Michelle, MD Sunjit, Jaspal, MD  =========================================================    VALENTIN CERON [id number]    Follow up: Left Foot wound     No new changes, no fever, no acute incidents overnight.  Pt states he is feeling very good and denies pain    Allergies:  No Known Allergies      Antibiotics:  acetaminophen     Tablet .. 650 milliGRAM(s) Oral every 6 hours PRN  atorvastatin 10 milliGRAM(s) Oral at bedtime  ceFAZolin   IVPB 2000 milliGRAM(s) IV Intermittent every 8 hours  dextrose 50% Injectable 12.5 Gram(s) IV Push once  dextrose 50% Injectable 25 Gram(s) IV Push once  dextrose 50% Injectable 25 Gram(s) IV Push once  dextrose Oral Gel 15 Gram(s) Oral once PRN  glucagon  Injectable 1 milliGRAM(s) IntraMuscular once  heparin   Injectable 5000 Unit(s) SubCutaneous every 8 hours  hydrochlorothiazide 12.5 milliGRAM(s) Oral daily  influenza  Vaccine (HIGH DOSE) 0.7 milliLiter(s) IntraMuscular once  insulin lispro (ADMELOG) corrective regimen sliding scale   SubCutaneous three times a day before meals  levothyroxine 50 MICROGram(s) Oral daily  losartan 100 milliGRAM(s) Oral daily  melatonin 3 milliGRAM(s) Oral at bedtime PRN  metoprolol succinate ER 25 milliGRAM(s) Oral daily       REVIEW OF SYSTEMS:  CONSTITUTIONAL:  No Fever or chills  HEENT:  No diplopia or blurred vision.  No sore throat or runny nose.  CARDIOVASCULAR:  No chest pain or SOB.  RESPIRATORY:  No cough, shortness of breath, PND or orthopnea.  GASTROINTESTINAL:  No nausea, vomiting or diarrhea.  GENITOURINARY:  No dysuria, frequency or urgency. No Blood in urine  MUSCULOSKELETAL:  no joint aches, no muscle pain  SKIN:  foot ulcer   NEUROLOGIC:  No paresthesias or weakness.  PSYCHIATRIC:  No disorder of thought or mood.  ENDOCRINE:  No heat or cold intolerance, polyuria or polydipsia.  HEMATOLOGICAL:  No easy bruising or bleeding.      Physical Exam:  ICU Vital Signs Last 24 Hrs  T(C): 36.9 (06 Dec 2023 04:57), Max: 36.9 (06 Dec 2023 04:57)  T(F): 98.4 (06 Dec 2023 04:57), Max: 98.4 (06 Dec 2023 04:57)  HR: 69 (06 Dec 2023 04:57) (48 - 69)  BP: 175/82 (06 Dec 2023 04:57) (134/69 - 175/82)  BP(mean): --  ABP: --  ABP(mean): --  RR: 19 (06 Dec 2023 04:57) (14 - 19)  SpO2: 95% (06 Dec 2023 04:57) (95% - 99%)    O2 Parameters below as of 06 Dec 2023 04:57  Patient On (Oxygen Delivery Method): room air           GEN: NAD  HEENT: normocephalic and atraumatic. EOMI. PERRL.    NECK: Supple.  No lymphadenopathy   LUNGS: Clear to auscultation.  HEART: Regular rate and rhythm without murmur.  ABDOMEN: Soft, nontender, and nondistended.  Positive bowel sounds.    : No CVA tenderness  EXTREMITIES: Without edema.  NEUROLOGIC: grossly intact.  PSYCHIATRIC: Appropriate affect .  SKIN: lateral side of left 4th toe with an open ulcer and priwound erythema with some   serosanguinous discharge     Labs:  12-05    136  |  97  |  11  ----------------------------<  182<H>  4.0   |  29  |  1.20    Ca    9.1      05 Dec 2023 08:00  Phos  2.8     12-05  Mg     1.7     12-05    TPro  7.6  /  Alb  3.2<L>  /  TBili  0.9  /  DBili  x   /  AST  16  /  ALT  15  /  AlkPhos  122<H>  12-05                          14.0   9.65  )-----------( 272      ( 05 Dec 2023 08:00 )             39.7     PT/INR - ( 05 Dec 2023 08:00 )   PT: 12.2 sec;   INR: 1.04 ratio         PTT - ( 05 Dec 2023 08:00 )  PTT:31.3 sec  Urinalysis Basic - ( 05 Dec 2023 08:00 )    Color: x / Appearance: x / SG: x / pH: x  Gluc: 182 mg/dL / Ketone: x  / Bili: x / Urobili: x   Blood: x / Protein: x / Nitrite: x   Leuk Esterase: x / RBC: x / WBC x   Sq Epi: x / Non Sq Epi: x / Bacteria: x      LIVER FUNCTIONS - ( 05 Dec 2023 08:00 )  Alb: 3.2 g/dL / Pro: 7.6 g/dL / ALK PHOS: 122 U/L / ALT: 15 U/L / AST: 16 U/L / GGT: x             RECENT CULTURES:  12-05 @ 11:00 .Tissue Other, LEFT FOOT 4TH TOE BONE CULTURE       Rare polymorphonuclear leukocytes per low power field  Numerous Gram Negative Rods per oil power field  Numerous Gram positive cocci in pairs per oil power field  Few Gram Positive Rods per oil power field      12-04 @ 13:00 .Blood Blood-Peripheral     No growth at 24 hours        12-04 @ 12:41 .Blood Blood-Peripheral     No growth at 24 hours              All imaging and data are reviewed.     Assessment and Plan:   72 yo man with PMH of HTN, DM2, and Hypothyroidism was admitted with an ulcer in left 4th toe. He has chronic pain to the left foot that started 2-3 months ago and has progressively worsened. Pt states that he could see the bone, follows with wound care who recommended MRI and hospitalization.     # Left 4th toe ulcer   - Awaiting surgical pathology  - Bone and soft tissue culture reveal:        Rare polymorphonuclear leukocytes, numerous gram negative rods, numerous gram positive cocci, few gram positive rods  - The old culture with MRSA and latest one with MSSA,   - Continue cefazolin for now   - S/P amputation of 4th toe and metatarsal head 12/5/23  - Based on culture and pathology will decide about the treatment regimen and length    Will follow.    Roverto Rincon MD  Division of Infectious Diseases   Please call ID service at [phone] with any question.      50 minutes spent on total encounter assessing patient, examination, chart review, counseling and coordinating care by the attending physician/nurse/care manager.   [facility]   INFECTIOUS DISEASES   [address] [place] [address]  Tel: [phone]     Fax: [phone]  =========================================================  MD Cedrick Sanz Kaushal, MD Cho, Michelle, MD Sunjit, Jaspal, MD  =========================================================    VALENTIN CERON [id number]    Follow up: Left Foot wound     No new changes, no fever, no acute incidents overnight.  Pt states he is feeling very good and denies pain    Allergies:  No Known Allergies      Antibiotics:  acetaminophen     Tablet .. 650 milliGRAM(s) Oral every 6 hours PRN  atorvastatin 10 milliGRAM(s) Oral at bedtime  ceFAZolin   IVPB 2000 milliGRAM(s) IV Intermittent every 8 hours  dextrose 50% Injectable 12.5 Gram(s) IV Push once  dextrose 50% Injectable 25 Gram(s) IV Push once  dextrose 50% Injectable 25 Gram(s) IV Push once  dextrose Oral Gel 15 Gram(s) Oral once PRN  glucagon  Injectable 1 milliGRAM(s) IntraMuscular once  heparin   Injectable 5000 Unit(s) SubCutaneous every 8 hours  hydrochlorothiazide 12.5 milliGRAM(s) Oral daily  influenza  Vaccine (HIGH DOSE) 0.7 milliLiter(s) IntraMuscular once  insulin lispro (ADMELOG) corrective regimen sliding scale   SubCutaneous three times a day before meals  levothyroxine 50 MICROGram(s) Oral daily  losartan 100 milliGRAM(s) Oral daily  melatonin 3 milliGRAM(s) Oral at bedtime PRN  metoprolol succinate ER 25 milliGRAM(s) Oral daily       REVIEW OF SYSTEMS:  CONSTITUTIONAL:  No Fever or chills  HEENT:  No diplopia or blurred vision.  No sore throat or runny nose.  CARDIOVASCULAR:  No chest pain or SOB.  RESPIRATORY:  No cough, shortness of breath, PND or orthopnea.  GASTROINTESTINAL:  No nausea, vomiting or diarrhea.  GENITOURINARY:  No dysuria, frequency or urgency. No Blood in urine  MUSCULOSKELETAL:  no joint aches, no muscle pain  SKIN:  foot ulcer   NEUROLOGIC:  No paresthesias or weakness.  PSYCHIATRIC:  No disorder of thought or mood.  ENDOCRINE:  No heat or cold intolerance, polyuria or polydipsia.  HEMATOLOGICAL:  No easy bruising or bleeding.      Physical Exam:  ICU Vital Signs Last 24 Hrs  T(C): 36.9 (06 Dec 2023 04:57), Max: 36.9 (06 Dec 2023 04:57)  T(F): 98.4 (06 Dec 2023 04:57), Max: 98.4 (06 Dec 2023 04:57)  HR: 69 (06 Dec 2023 04:57) (48 - 69)  BP: 175/82 (06 Dec 2023 04:57) (134/69 - 175/82)  BP(mean): --  ABP: --  ABP(mean): --  RR: 19 (06 Dec 2023 04:57) (14 - 19)  SpO2: 95% (06 Dec 2023 04:57) (95% - 99%)    O2 Parameters below as of 06 Dec 2023 04:57  Patient On (Oxygen Delivery Method): room air           GEN: NAD  HEENT: normocephalic and atraumatic. EOMI. PERRL.    NECK: Supple.  No lymphadenopathy   LUNGS: Clear to auscultation.  HEART: Regular rate and rhythm without murmur.  ABDOMEN: Soft, nontender, and nondistended.  Positive bowel sounds.    : No CVA tenderness  EXTREMITIES: Without edema.  NEUROLOGIC: grossly intact.  PSYCHIATRIC: Appropriate affect .  SKIN: lateral side of left 4th toe with an open ulcer and priwound erythema with some   serosanguinous discharge     Labs:  12-05    136  |  97  |  11  ----------------------------<  182<H>  4.0   |  29  |  1.20    Ca    9.1      05 Dec 2023 08:00  Phos  2.8     12-05  Mg     1.7     12-05    TPro  7.6  /  Alb  3.2<L>  /  TBili  0.9  /  DBili  x   /  AST  16  /  ALT  15  /  AlkPhos  122<H>  12-05                          14.0   9.65  )-----------( 272      ( 05 Dec 2023 08:00 )             39.7     PT/INR - ( 05 Dec 2023 08:00 )   PT: 12.2 sec;   INR: 1.04 ratio         PTT - ( 05 Dec 2023 08:00 )  PTT:31.3 sec  Urinalysis Basic - ( 05 Dec 2023 08:00 )    Color: x / Appearance: x / SG: x / pH: x  Gluc: 182 mg/dL / Ketone: x  / Bili: x / Urobili: x   Blood: x / Protein: x / Nitrite: x   Leuk Esterase: x / RBC: x / WBC x   Sq Epi: x / Non Sq Epi: x / Bacteria: x      LIVER FUNCTIONS - ( 05 Dec 2023 08:00 )  Alb: 3.2 g/dL / Pro: 7.6 g/dL / ALK PHOS: 122 U/L / ALT: 15 U/L / AST: 16 U/L / GGT: x             RECENT CULTURES:  12-05 @ 11:00 .Tissue Other, LEFT FOOT 4TH TOE BONE CULTURE       Rare polymorphonuclear leukocytes per low power field  Numerous Gram Negative Rods per oil power field  Numerous Gram positive cocci in pairs per oil power field  Few Gram Positive Rods per oil power field      12-04 @ 13:00 .Blood Blood-Peripheral     No growth at 24 hours        12-04 @ 12:41 .Blood Blood-Peripheral     No growth at 24 hours              All imaging and data are reviewed.     Assessment and Plan:   72 yo man with PMH of HTN, DM2, and Hypothyroidism was admitted with an ulcer in left 4th toe. He has chronic pain to the left foot that started 2-3 months ago and has progressively worsened. Pt states that he could see the bone, follows with wound care who recommended MRI and hospitalization.     # Left 4th toe ulcer   - Awaiting surgical pathology  - Bone and soft tissue culture reveal:        Rare polymorphonuclear leukocytes, numerous gram negative rods, numerous gram positive cocci, few gram positive rods  - The old culture with MRSA and latest one with MSSA,   - Continue cefazolin for now   - S/P amputation of 4th toe and metatarsal head 12/5/23  - Based on culture and pathology will decide about the treatment regimen and length    Will follow.    Roverto Rincon MD  Division of Infectious Diseases   Please call ID service at [phone] with any question.      50 minutes spent on total encounter assessing patient, examination, chart review, counseling and coordinating care by the attending physician/nurse/care manager.

## 2023-12-06 NOTE — CONSULT NOTE ADULT - PROBLEM SELECTOR RECOMMENDATION 9
cont mod dose admelog corrective scale coverage qac/qhs  cont cons cho diet  pioglitazone on hold  goal bg 100-180 in hosp setting

## 2023-12-06 NOTE — CARE COORDINATION ASSESSMENT. - PRO ARRIVE FROM
Per pt  lives with spouse and was independent w ADL, ambulation, driving and med management prior to admission. Pt drives to appointments, has 1 stairs to enter home, 0 steps inside stated everything is on same level, denies community services, denies DME or need for usage. CM verified: PCP: DR. Cain Pharmacy: [facility]. .: stated no. Pt stated his son will pick him up when medically safe for dc/home Per pt  lives with spouse and was independent w ADL, ambulation, driving and med management prior to admission. Pt drives to appointments, has 1 stairs to enter home, 0 steps inside stated everything is on same level, denies community services, denies DME or need for usage. CM verified: PCP: DR. Cain Pharmacy: [facility]. .: stated no. Pt stated his son will pick him up when medically safe for dc/home

## 2023-12-06 NOTE — PROGRESS NOTE ADULT - ASSESSMENT
73 yoM with PMH of HTN, DM type 2 (on insulin), Hypothyroidism, and hyperlipidemia presents to the ED by wound care for evaluation of left 4th toe wound    pre/post optimization, HTN   - s/p amputation of 4th toe and metatarsal bone of left foot (12/5), ortho following    - BP mostly uncontrolled 160-190's per flow sheet, likely reactive, SR 60's on tele   - Continue metoprolol succinate 25 mg daily   - continue HCTZ 12.5 mg PO daily   - continue Losartan 100 mg PO daily   - would start hydralazine 25 mg PO TID, uptitrate PRN for better BP control   - continue to monitor routine hemodynamics  - pain control per primary     - EKG sinus bradycardia, rate 55  - no prior EKG or cardiac records available  - No clear evidence of acute ischemia  - continue home atorvastatin 10 mg  - no anginal complaints   - Monitor and replete Lytes. Keep K > 4 and Mg > 2    - No meaningful evidence of volume overload. trace LE edema  - can obtain TTE while admitted, No prior TTE available    - Will continue to follow.    Domi Biggs [facility]  Nurse Practitioner - Cardiology   call TEAMS   73 yoM with PMH of HTN, DM type 2 (on insulin), Hypothyroidism, and hyperlipidemia presents to the ED by wound care for evaluation of left 4th toe wound    pre/post optimization, HTN   - s/p amputation of 4th toe and metatarsal bone of left foot (12/5), ortho following    - BP mostly uncontrolled 160-190's per flow sheet, likely reactive, SR 60's on tele   - Continue metoprolol succinate 25 mg daily   - continue HCTZ 12.5 mg PO daily   - continue Losartan 100 mg PO daily   - would start hydralazine 25 mg PO TID, uptitrate PRN for better BP control   - continue to monitor routine hemodynamics  - pain control per primary     - EKG sinus bradycardia, rate 55  - no prior EKG or cardiac records available  - No clear evidence of acute ischemia  - continue home atorvastatin 10 mg  - no anginal complaints   - Monitor and replete Lytes. Keep K > 4 and Mg > 2    - No meaningful evidence of volume overload. trace LE edema  - can obtain TTE while admitted, No prior TTE available    - Will continue to follow.    Domi Biggs [employer]  Nurse Practitioner - Cardiology   call TEAMS

## 2023-12-06 NOTE — PROVIDER CONTACT NOTE (CRITICAL VALUE NOTIFICATION) - BACKGROUND
patient admitted for open wound/diabetic ulcer of left foot and had left 4th toe metatarsal resection performed on 12/5/23

## 2023-12-06 NOTE — CARE COORDINATION ASSESSMENT. - OTHER PERTINENT DISCHARGE PLANNING INFORMATION:
Met patient at bedside.  Explained role of CM, verbalized understanding. Pt was made aware a CM will remain available through hospitalization.  Contact information given in discharge/ transitions resource folder. Per pt  lives with spouse and was independent w ADL, ambulation, driving and med management prior to admission. Pt drives to appointments, has 1 stairs to enter home, 0 steps inside stated everything is on same level, denies community services, denies DME or need for usage. CM verified: PCP: DR. Cain Pharmacy: [facility]. .[place]: stated no. Pt stated his son will pick him up when medically safe for discharge Met patient at bedside.  Explained role of CM, verbalized understanding. Pt was made aware a CM will remain available through hospitalization.  Contact information given in discharge/ transitions resource folder. Per pt  lives with spouse and was independent w ADL, ambulation, driving and med management prior to admission. Pt drives to appointments, has 1 stairs to enter home, 0 steps inside stated everything is on same level, denies community services, denies DME or need for usage. CM verified: PCP: DR. Cain Pharmacy: [facility]. .[place]: stated no. Pt stated his son will pick him up when medically safe for discharge

## 2023-12-06 NOTE — CARE COORDINATION ASSESSMENT. - NSPASTMEDSURGHISTORY_GEN_ALL_CORE_FT
PAST MEDICAL & SURGICAL HISTORY:  Hyperlipidemia      Hypothyroidism      Diabetes mellitus      Hypertension      No significant past surgical history

## 2023-12-06 NOTE — CARE COORDINATION ASSESSMENT. - NSCAREPROVIDERS_GEN_ALL_CORE_FT
CARE PROVIDERS:  Accepting Physician: Fish Philip  Access Services: Lara Cerna  Administration: Jerardo Denise  Administration: Justice Randall  Administration: Simon Card  Admitting: Fish Philip  Attending: Gavin Estevez  Case Management: Kamilla Harding  Case Management: Luis Miguel Gilman  Consultant: Domi Biggs  Consultant: José Watkins  Consultant: Marcos Cavanaugh  Consultant: Nabil Goff  Consultant: Weil, Patricia  Consultant: Poonam Dobbs  Consultant: Alice Pollock  Consultant: Roverto Rincon  Consultant: Thania Kyle  Consultant: Adam Sanabria  ED ACP: Tova Mckeon  ED Attending: Rand Philip ED Nurse: Sharda Dela Cruz  Infection Control: Jenny Manzano  Nurse: Lion Palmer  Nurse: Ysabel Burnett  Ordered: ADM, User  Ordered: Doctor, Unknown  Override: Lorrie Stone  PCA/Nursing Assistant: Vane Lu  Physical Therapy: [facility]  Primary Team: Santi Colbert  Primary Team: Bon Kumar  Primary Team: Gavin Estevez  Primary Team: Fish Philip  Primary Team: Tevin Miles  Registered Dietitian: Andressa Castaneda  Respiratory Therapy: Tiara Schuler  Team: [facility], Team  UR// Supp. Assoc.: Cassidy Polo   CARE PROVIDERS:  Accepting Physician: Fish Philip  Access Services: Lara Cerna  Administration: Jerardo Denise  Administration: Justice Randall  Administration: Simon Card  Admitting: Fish Philip  Attending: Gavin Estevez  Case Management: Kamilla Harding  Case Management: Luis Miguel Gilman  Consultant: Domi Biggs  Consultant: José Watkins  Consultant: Marcos Cavanaugh  Consultant: Nabil Goff  Consultant: Weil, Patricia  Consultant: Poonam Dobbs  Consultant: Alice Pollock  Consultant: Roverto Rincon  Consultant: Thania Kyle  Consultant: Adam Sanabria  ED ACP: Tova Mckeon  ED Attending: Rand Philip ED Nurse: Sharda Dela Cruz  Infection Control: Jenny Manzano  Nurse: Lion Palemr  Nurse: Ysabel Burnett  Ordered: ADM, User  Ordered: Doctor, Unknown  Override: Lorrie Stone  PCA/Nursing Assistant: Vane Lu  Physical Therapy: Pillo De Leon  Primary Team: Santi Colbert  Primary Team: Bon Kumar  Primary Team: Gavin Estevez  Primary Team: Fish Philip  Primary Team: Tevin Miles  Registered Dietitian: Andressa Castaneda  Respiratory Therapy: Tiara Schuler  Team: [facility], Team  UR// Supp. Assoc.: Cassidy Polo

## 2023-12-06 NOTE — PROGRESS NOTE ADULT - ATTENDING COMMENTS
No new changes, no pain, doing very well.   Cultures from OR with GNR and GPC, will follow ID and sensitivity.  If pathology with clean margines can switch to oral antibiotics.   He has MRSA few months ago but latest culture is MSSA. Currently on cefazolin.

## 2023-12-06 NOTE — PROGRESS NOTE ADULT - SUBJECTIVE AND OBJECTIVE BOX
INTERVAL HPI/OVERNIGHT EVENTS:  Patient seen and examined at bedside. States he is feeling well. Denies any pain in his L foot, states he would like to work with PT. Awaiting pathology results.    ROS:  All other review of systems is negative unless indicated above.    MEDICATIONS  (STANDING):  atorvastatin 10 milliGRAM(s) Oral at bedtime  ceFAZolin   IVPB 2000 milliGRAM(s) IV Intermittent every 8 hours  dextrose 50% Injectable 12.5 Gram(s) IV Push once  dextrose 50% Injectable 25 Gram(s) IV Push once  dextrose 50% Injectable 25 Gram(s) IV Push once  glucagon  Injectable 1 milliGRAM(s) IntraMuscular once  heparin   Injectable 5000 Unit(s) SubCutaneous every 8 hours  hydrochlorothiazide 12.5 milliGRAM(s) Oral daily  influenza  Vaccine (HIGH DOSE) 0.7 milliLiter(s) IntraMuscular once  insulin lispro (ADMELOG) corrective regimen sliding scale   SubCutaneous three times a day before meals  insulin lispro (ADMELOG) corrective regimen sliding scale   SubCutaneous at bedtime  levothyroxine 50 MICROGram(s) Oral daily  losartan 100 milliGRAM(s) Oral daily  metoprolol succinate ER 25 milliGRAM(s) Oral daily  mupirocin 2% Nasal 1 Application(s) Both Nostrils two times a day    MEDICATIONS  (PRN):  acetaminophen     Tablet .. 650 milliGRAM(s) Oral every 6 hours PRN Temp greater or equal to 38C (100.4F), Mild Pain (1 - 3)  dextrose Oral Gel 15 Gram(s) Oral once PRN Blood Glucose LESS THAN 70 milliGRAM(s)/deciliter  melatonin 3 milliGRAM(s) Oral at bedtime PRN Insomnia      Allergies    No Known Allergies    Intolerances          Vital Signs Last 24 Hrs  T(C): 36.3 (06 Dec 2023 20:38), Max: 36.9 (06 Dec 2023 04:57)  T(F): 97.4 (06 Dec 2023 20:38), Max: 98.4 (06 Dec 2023 04:57)  HR: 61 (06 Dec 2023 20:38) (61 - 76)  BP: 137/66 (06 Dec 2023 20:38) (112/67 - 175/82)  BP(mean): --  RR: 18 (06 Dec 2023 20:38) (18 - 19)  SpO2: 95% (06 Dec 2023 20:38) (95% - 96%)    Parameters below as of 06 Dec 2023 20:38  Patient On (Oxygen Delivery Method): room air        12-05 @ 07:01  -  12-06 @ 07:00  --------------------------------------------------------  IN: 100 mL / OUT: 0 mL / NET: 100 mL      Physical Exam:  General: laying comfortably in bed, NAD  Neurology: A&Ox3, nonfocal, MADRID x 4  Respiratory: CTA B/L  CV: RRR, S1S2, no murmurs, rubs or gallops  Abdominal: Soft, NT, ND +BS  Extremities: L foot wrapped in gauze- dressing C/D/I, + peripheral pulses      LABS:                        12.9   9.07  )-----------( 290      ( 06 Dec 2023 09:25 )             37.7     12-06    133<L>  |  94<L>  |  10  ----------------------------<  252<H>  3.8   |  30  |  1.20    Ca    9.2      06 Dec 2023 09:25  Phos  2.8     12-05  Mg     1.7     12-05    TPro  7.5  /  Alb  3.0<L>  /  TBili  0.7  /  DBili  x   /  AST  10<L>  /  ALT  12  /  AlkPhos  115  12-06    PT/INR - ( 05 Dec 2023 08:00 )   PT: 12.2 sec;   INR: 1.04 ratio         PTT - ( 05 Dec 2023 08:00 )  PTT:31.3 sec  Urinalysis Basic - ( 06 Dec 2023 09:25 )    Color: x / Appearance: x / SG: x / pH: x  Gluc: 252 mg/dL / Ketone: x  / Bili: x / Urobili: x   Blood: x / Protein: x / Nitrite: x   Leuk Esterase: x / RBC: x / WBC x   Sq Epi: x / Non Sq Epi: x / Bacteria: x        RADIOLOGY & ADDITIONAL TESTS:

## 2023-12-06 NOTE — PROVIDER CONTACT NOTE (CRITICAL VALUE NOTIFICATION) - TEST AND RESULT REPORTED:
2 tissue cultures: 1. rare polymorphnuclear leukocytes and few gram + cocci in pairs, 2. rare polymorphnuclear leukocytes, numerous gram - rods, numerous gram + cocci in pairs and few gram + rods per oil power field

## 2023-12-06 NOTE — PROGRESS NOTE ADULT - SUBJECTIVE AND OBJECTIVE BOX
[facility] -- Roland Khoury Pannella, Patel, Savella, Goodger, Cohen  Office # [phone]    Follow Up:  HTN post op    Subjective/Observations: seen and examined, awake, alert, resting comfortably in bed, denies chest pain, dyspnea, palpitations or dizziness, orthopnea and PND. Tolerating room air.     REVIEW OF SYSTEMS: All other review of systems is negative unless indicated above  PAST MEDICAL & SURGICAL HISTORY:  Hypertension      Diabetes mellitus      Hypothyroidism      Hyperlipidemia      No significant past surgical history        MEDICATIONS  (STANDING):  atorvastatin 10 milliGRAM(s) Oral at bedtime  ceFAZolin   IVPB 2000 milliGRAM(s) IV Intermittent every 8 hours  dextrose 50% Injectable 12.5 Gram(s) IV Push once  dextrose 50% Injectable 25 Gram(s) IV Push once  dextrose 50% Injectable 25 Gram(s) IV Push once  glucagon  Injectable 1 milliGRAM(s) IntraMuscular once  heparin   Injectable 5000 Unit(s) SubCutaneous every 8 hours  hydrochlorothiazide 12.5 milliGRAM(s) Oral daily  influenza  Vaccine (HIGH DOSE) 0.7 milliLiter(s) IntraMuscular once  insulin lispro (ADMELOG) corrective regimen sliding scale   SubCutaneous three times a day before meals  levothyroxine 50 MICROGram(s) Oral daily  losartan 100 milliGRAM(s) Oral daily  metoprolol succinate ER 25 milliGRAM(s) Oral daily    MEDICATIONS  (PRN):  acetaminophen     Tablet .. 650 milliGRAM(s) Oral every 6 hours PRN Temp greater or equal to 38C (100.4F), Mild Pain (1 - 3)  dextrose Oral Gel 15 Gram(s) Oral once PRN Blood Glucose LESS THAN 70 milliGRAM(s)/deciliter  melatonin 3 milliGRAM(s) Oral at bedtime PRN Insomnia    Allergies    No Known Allergies    Intolerances      Vital Signs Last 24 Hrs  T(C): 36.9 (06 Dec 2023 04:57), Max: 36.9 (06 Dec 2023 04:57)  T(F): 98.4 (06 Dec 2023 04:57), Max: 98.4 (06 Dec 2023 04:57)  HR: 69 (06 Dec 2023 04:57) (48 - 69)  BP: 175/82 (06 Dec 2023 04:57) (142/59 - 175/82)  BP(mean): --  RR: 19 (06 Dec 2023 04:57) (14 - 19)  SpO2: 95% (06 Dec 2023 04:57) (95% - 99%)    Parameters below as of 06 Dec 2023 04:57  Patient On (Oxygen Delivery Method): room air      I&O's Summary    05 Dec 2023 07:01  -  06 Dec 2023 07:00  --------------------------------------------------------  IN: 100 mL / OUT: 0 mL / NET: 100 mL          TELE: SR 60's   PHYSICAL EXAM:  Constitutional: NAD, awake and alert  HEENT: Moist Mucous Membranes, Anicteric  Pulmonary: Non-labored, breath sounds are clear bilaterally, No wheezing, rales or rhonchi  Cardiovascular: Regular, S1 and S2, No murmurs, rubs, gallops or clicks  Gastrointestinal: Bowel Sounds present, soft, nontender.   Lymph: trace peripheral edema. No lymphadenopathy.  Skin: No visible rashes or ulcers.  Psych:  Mood & affect appropriate  LABS: All Labs Reviewed:                        12.9   9.07  )-----------( 290      ( 06 Dec 2023 09:25 )             37.7                         14.0   9.65  )-----------( 272      ( 05 Dec 2023 08:00 )             39.7                         14.2   11.96 )-----------( 312      ( 04 Dec 2023 12:57 )             41.4     06 Dec 2023 09:25    133    |  94     |  10     ----------------------------<  252    3.8     |  30     |  1.20   05 Dec 2023 08:00    136    |  97     |  11     ----------------------------<  182    4.0     |  29     |  1.20   04 Dec 2023 12:57    136    |  98     |  13     ----------------------------<  192    4.1     |  26     |  1.20     Ca    9.2        06 Dec 2023 09:25  Ca    9.1        05 Dec 2023 08:00  Ca    9.5        04 Dec 2023 12:57  Phos  2.8       05 Dec 2023 08:00  Mg     1.7       05 Dec 2023 08:00    TPro  7.5    /  Alb  3.0    /  TBili  0.7    /  DBili  x      /  AST  10     /  ALT  12     /  AlkPhos  115    06 Dec 2023 09:25  TPro  7.6    /  Alb  3.2    /  TBili  0.9    /  DBili  x      /  AST  16     /  ALT  15     /  AlkPhos  122    05 Dec 2023 08:00  TPro  8.5    /  Alb  3.6    /  TBili  0.7    /  DBili  x      /  AST  19     /  ALT  21     /  AlkPhos  132    04 Dec 2023 12:57    PT/INR - ( 05 Dec 2023 08:00 )   PT: 12.2 sec;   INR: 1.04 ratio         PTT - ( 05 Dec 2023 08:00 )  PTT:31.3 sec      12 Lead ECG:   Ventricular Rate 55 BPM    Atrial Rate 55 BPM    P-R Interval 150 ms    QRS Duration 90 ms    Q-T Interval 434 ms    QTC Calculation(Bazett) 415 ms    P Axis 59 degrees    R Axis -17 degrees    T Axis 20 degrees    Diagnosis Line Sinus bradycardia  Otherwise normal ECG  No previous ECGs available  Confirmed by deena Goff ([id number]) on 12/5/2023 1:27:39 PM (12-04-23 @ 14:12)        [facility] -- Roland Khoury Pannella, Patel, Savella, Goodger, Cohen  Office # [phone]    Follow Up:  HTN post op    Subjective/Observations: seen and examined, awake, alert, resting comfortably in bed, denies chest pain, dyspnea, palpitations or dizziness, orthopnea and PND. Tolerating room air.     REVIEW OF SYSTEMS: All other review of systems is negative unless indicated above  PAST MEDICAL & SURGICAL HISTORY:  Hypertension      Diabetes mellitus      Hypothyroidism      Hyperlipidemia      No significant past surgical history        MEDICATIONS  (STANDING):  atorvastatin 10 milliGRAM(s) Oral at bedtime  ceFAZolin   IVPB 2000 milliGRAM(s) IV Intermittent every 8 hours  dextrose 50% Injectable 12.5 Gram(s) IV Push once  dextrose 50% Injectable 25 Gram(s) IV Push once  dextrose 50% Injectable 25 Gram(s) IV Push once  glucagon  Injectable 1 milliGRAM(s) IntraMuscular once  heparin   Injectable 5000 Unit(s) SubCutaneous every 8 hours  hydrochlorothiazide 12.5 milliGRAM(s) Oral daily  influenza  Vaccine (HIGH DOSE) 0.7 milliLiter(s) IntraMuscular once  insulin lispro (ADMELOG) corrective regimen sliding scale   SubCutaneous three times a day before meals  levothyroxine 50 MICROGram(s) Oral daily  losartan 100 milliGRAM(s) Oral daily  metoprolol succinate ER 25 milliGRAM(s) Oral daily    MEDICATIONS  (PRN):  acetaminophen     Tablet .. 650 milliGRAM(s) Oral every 6 hours PRN Temp greater or equal to 38C (100.4F), Mild Pain (1 - 3)  dextrose Oral Gel 15 Gram(s) Oral once PRN Blood Glucose LESS THAN 70 milliGRAM(s)/deciliter  melatonin 3 milliGRAM(s) Oral at bedtime PRN Insomnia    Allergies    No Known Allergies    Intolerances      Vital Signs Last 24 Hrs  T(C): 36.9 (06 Dec 2023 04:57), Max: 36.9 (06 Dec 2023 04:57)  T(F): 98.4 (06 Dec 2023 04:57), Max: 98.4 (06 Dec 2023 04:57)  HR: 69 (06 Dec 2023 04:57) (48 - 69)  BP: 175/82 (06 Dec 2023 04:57) (142/59 - 175/82)  BP(mean): --  RR: 19 (06 Dec 2023 04:57) (14 - 19)  SpO2: 95% (06 Dec 2023 04:57) (95% - 99%)    Parameters below as of 06 Dec 2023 04:57  Patient On (Oxygen Delivery Method): room air      I&O's Summary    05 Dec 2023 07:01  -  06 Dec 2023 07:00  --------------------------------------------------------  IN: 100 mL / OUT: 0 mL / NET: 100 mL          TELE: SR 60's   PHYSICAL EXAM:  Constitutional: NAD, awake and alert  HEENT: Moist Mucous Membranes, Anicteric  Pulmonary: Non-labored, breath sounds are clear bilaterally, No wheezing, rales or rhonchi  Cardiovascular: Regular, S1 and S2, No murmurs, rubs, gallops or clicks  Gastrointestinal: Bowel Sounds present, soft, nontender.   Lymph: trace peripheral edema. No lymphadenopathy.  Skin: No visible rashes or ulcers.  Psych:  Mood & affect appropriate  LABS: All Labs Reviewed:                        12.9   9.07  )-----------( 290      ( 06 Dec 2023 09:25 )             37.7                         14.0   9.65  )-----------( 272      ( 05 Dec 2023 08:00 )             39.7                         14.2   11.96 )-----------( 312      ( 04 Dec 2023 12:57 )             41.4     06 Dec 2023 09:25    133    |  94     |  10     ----------------------------<  252    3.8     |  30     |  1.20   05 Dec 2023 08:00    136    |  97     |  11     ----------------------------<  182    4.0     |  29     |  1.20   04 Dec 2023 12:57    136    |  98     |  13     ----------------------------<  192    4.1     |  26     |  1.20     Ca    9.2        06 Dec 2023 09:25  Ca    9.1        05 Dec 2023 08:00  Ca    9.5        04 Dec 2023 12:57  Phos  2.8       05 Dec 2023 08:00  Mg     1.7       05 Dec 2023 08:00    TPro  7.5    /  Alb  3.0    /  TBili  0.7    /  DBili  x      /  AST  10     /  ALT  12     /  AlkPhos  115    06 Dec 2023 09:25  TPro  7.6    /  Alb  3.2    /  TBili  0.9    /  DBili  x      /  AST  16     /  ALT  15     /  AlkPhos  122    05 Dec 2023 08:00  TPro  8.5    /  Alb  3.6    /  TBili  0.7    /  DBili  x      /  AST  19     /  ALT  21     /  AlkPhos  132    04 Dec 2023 12:57    PT/INR - ( 05 Dec 2023 08:00 )   PT: 12.2 sec;   INR: 1.04 ratio         PTT - ( 05 Dec 2023 08:00 )  PTT:31.3 sec      12 Lead ECG:   Ventricular Rate 55 BPM    Atrial Rate 55 BPM    P-R Interval 150 ms    QRS Duration 90 ms    Q-T Interval 434 ms    QTC Calculation(Bazett) 415 ms    P Axis 59 degrees    R Axis -17 degrees    T Axis 20 degrees    Diagnosis Line Sinus bradycardia  Otherwise normal ECG  No previous ECGs available  Confirmed by deena Goff ([id number]) on 12/5/2023 1:27:39 PM (12-04-23 @ 14:12)

## 2023-12-06 NOTE — PROGRESS NOTE ADULT - SUBJECTIVE AND OBJECTIVE BOX
73y year old Male seen at [facility] 2NOR 239 D1 s/p left 4th digit and 4th metatarsal head resection DOS 12/5/23. Patient relates no overnight events and states that they are doing well at this time.  Denies any fever, chills, nausea, vomiting, chest pain, shortness of breath, or calf pain at this time.    Allergies    No Known Allergies    Intolerances        MEDICATIONS  (STANDING):  atorvastatin 10 milliGRAM(s) Oral at bedtime  ceFAZolin   IVPB 2000 milliGRAM(s) IV Intermittent every 8 hours  dextrose 50% Injectable 12.5 Gram(s) IV Push once  dextrose 50% Injectable 25 Gram(s) IV Push once  dextrose 50% Injectable 25 Gram(s) IV Push once  glucagon  Injectable 1 milliGRAM(s) IntraMuscular once  heparin   Injectable 5000 Unit(s) SubCutaneous every 8 hours  hydrochlorothiazide 12.5 milliGRAM(s) Oral daily  influenza  Vaccine (HIGH DOSE) 0.7 milliLiter(s) IntraMuscular once  insulin lispro (ADMELOG) corrective regimen sliding scale   SubCutaneous three times a day before meals  insulin lispro (ADMELOG) corrective regimen sliding scale   SubCutaneous at bedtime  levothyroxine 50 MICROGram(s) Oral daily  losartan 100 milliGRAM(s) Oral daily  metoprolol succinate ER 25 milliGRAM(s) Oral daily  mupirocin 2% Nasal 1 Application(s) Both Nostrils two times a day    MEDICATIONS  (PRN):  acetaminophen     Tablet .. 650 milliGRAM(s) Oral every 6 hours PRN Temp greater or equal to 38C (100.4F), Mild Pain (1 - 3)  dextrose Oral Gel 15 Gram(s) Oral once PRN Blood Glucose LESS THAN 70 milliGRAM(s)/deciliter  melatonin 3 milliGRAM(s) Oral at bedtime PRN Insomnia      Vital Signs Last 24 Hrs  T(C): 36.6 (06 Dec 2023 12:29), Max: 36.9 (06 Dec 2023 04:57)  T(F): 97.9 (06 Dec 2023 12:29), Max: 98.4 (06 Dec 2023 04:57)  HR: 76 (06 Dec 2023 12:29) (48 - 76)  BP: 112/67 (06 Dec 2023 12:29) (112/67 - 175/82)  BP(mean): --  RR: 18 (06 Dec 2023 12:29) (18 - 19)  SpO2: 96% (06 Dec 2023 12:29) (95% - 98%)    Parameters below as of 06 Dec 2023 12:29  Patient On (Oxygen Delivery Method): room air      PHYSICAL EXAM:  Vascular: DP & PT 1/4  palpable bilaterally, Capillary refill 3 seconds, Digital hair absent bilaterally  Neurological: Light touch sensation intact bilaterally  Musculoskeletal: 5/5 strength in all quadrants bilaterally, AJ & STJ ROM intact, s/p left 4th digit and 4th metatarsal head resection   Dermatological: Incision site of the LEFT foot noted with intact sutures, maceration to the central surgical site, no dehiscence, mild thea-incision erythema, no proximal streaking, no fluctuance, no malodor, no signs of infection at this time.    CBC Full  -  ( 06 Dec 2023 09:25 )  WBC Count : 9.07 K/uL  RBC Count : 4.67 M/uL  Hemoglobin : 12.9 g/dL  Hematocrit : 37.7 %  Platelet Count - Automated : 290 K/uL  Mean Cell Volume : 80.7 fl  Mean Cell Hemoglobin : 27.6 pg  Mean Cell Hemoglobin Concentration : 34.2 gm/dL  Auto Neutrophil # : 6.29 K/uL  Auto Lymphocyte # : 1.48 K/uL  Auto Monocyte # : 1.01 K/uL  Auto Eosinophil # : 0.20 K/uL  Auto Basophil # : 0.06 K/uL  Auto Neutrophil % : 69.4 %  Auto Lymphocyte % : 16.3 %  Auto Monocyte % : 11.1 %  Auto Eosinophil % : 2.2 %  Auto Basophil % : 0.7 %      12-06    133<L>  |  94<L>  |  10  ----------------------------<  252<H>  3.8   |  30  |  1.20    Ca    9.2      06 Dec 2023 09:25  Phos  2.8     12-05  Mg     1.7     12-05    TPro  7.5  /  Alb  3.0<L>  /  TBili  0.7  /  DBili  x   /  AST  10<L>  /  ALT  12  /  AlkPhos  115  12-06                          12.9   9.07  )-----------( 290      ( 06 Dec 2023 09:25 )             37.7     PT/INR - ( 05 Dec 2023 08:00 )   PT: 12.2 sec;   INR: 1.04 ratio         PTT - ( 05 Dec 2023 08:00 )  PTT:31.3 sec      Culture - Tissue with Gram Stain (collected 05 Dec 2023 11:00)  Source: .Tissue Other, LEFT FOOT 4TH METATARSO HEAD  Gram Stain (05 Dec 2023 22:43):    Rare polymorphonuclear leukocytes per low power field    Few Gram positive cocci in pairs per oil power field  Preliminary Report (06 Dec 2023 14:23):    Rare Klebsiella pneumoniae    Few Streptococcus mitis/oralis group "Susceptibilities not performed"    Culture - Tissue with Gram Stain (collected 05 Dec 2023 11:00)  Source: .Tissue Other, LEFT FOOT 4TH TOE BONE CULTURE  Gram Stain (05 Dec 2023 22:43):    Rare polymorphonuclear leukocytes per low power field    Numerous Gram Negative Rods per oil power field    Numerous Gram positive cocci in pairs per oil power field    Few Gram Positive Rods per oil power field  Preliminary Report (06 Dec 2023 14:23):    Few Klebsiella pneumoniae    Moderate Streptococcus mitis/oralis group "Susceptibilities not performed"    Culture - Blood (collected 04 Dec 2023 13:00)  Source: .Blood Blood-Peripheral  Preliminary Report (05 Dec 2023 19:01):    No growth at 24 hours    Culture - Blood (collected 04 Dec 2023 12:41)  Source: .Blood Blood-Peripheral  Preliminary Report (05 Dec 2023 19:01):    No growth at 24 hours        Imaging: ----------  s/p left 4th digit and 4th metatarsal head resection  73y year old Male seen at [facility] 2NOR 239 D1 s/p left 4th digit and 4th metatarsal head resection DOS 12/5/23. Patient relates no overnight events and states that they are doing well at this time.  Denies any fever, chills, nausea, vomiting, chest pain, shortness of breath, or calf pain at this time.    Allergies    No Known Allergies    Intolerances        MEDICATIONS  (STANDING):  atorvastatin 10 milliGRAM(s) Oral at bedtime  ceFAZolin   IVPB 2000 milliGRAM(s) IV Intermittent every 8 hours  dextrose 50% Injectable 12.5 Gram(s) IV Push once  dextrose 50% Injectable 25 Gram(s) IV Push once  dextrose 50% Injectable 25 Gram(s) IV Push once  glucagon  Injectable 1 milliGRAM(s) IntraMuscular once  heparin   Injectable 5000 Unit(s) SubCutaneous every 8 hours  hydrochlorothiazide 12.5 milliGRAM(s) Oral daily  influenza  Vaccine (HIGH DOSE) 0.7 milliLiter(s) IntraMuscular once  insulin lispro (ADMELOG) corrective regimen sliding scale   SubCutaneous three times a day before meals  insulin lispro (ADMELOG) corrective regimen sliding scale   SubCutaneous at bedtime  levothyroxine 50 MICROGram(s) Oral daily  losartan 100 milliGRAM(s) Oral daily  metoprolol succinate ER 25 milliGRAM(s) Oral daily  mupirocin 2% Nasal 1 Application(s) Both Nostrils two times a day    MEDICATIONS  (PRN):  acetaminophen     Tablet .. 650 milliGRAM(s) Oral every 6 hours PRN Temp greater or equal to 38C (100.4F), Mild Pain (1 - 3)  dextrose Oral Gel 15 Gram(s) Oral once PRN Blood Glucose LESS THAN 70 milliGRAM(s)/deciliter  melatonin 3 milliGRAM(s) Oral at bedtime PRN Insomnia      Vital Signs Last 24 Hrs  T(C): 36.6 (06 Dec 2023 12:29), Max: 36.9 (06 Dec 2023 04:57)  T(F): 97.9 (06 Dec 2023 12:29), Max: 98.4 (06 Dec 2023 04:57)  HR: 76 (06 Dec 2023 12:29) (48 - 76)  BP: 112/67 (06 Dec 2023 12:29) (112/67 - 175/82)  BP(mean): --  RR: 18 (06 Dec 2023 12:29) (18 - 19)  SpO2: 96% (06 Dec 2023 12:29) (95% - 98%)    Parameters below as of 06 Dec 2023 12:29  Patient On (Oxygen Delivery Method): room air      PHYSICAL EXAM:  Vascular: DP & PT 1/4  palpable bilaterally, Capillary refill 3 seconds, Digital hair absent bilaterally  Neurological: Light touch sensation intact bilaterally  Musculoskeletal: 5/5 strength in all quadrants bilaterally, AJ & STJ ROM intact, s/p left 4th digit and 4th metatarsal head resection   Dermatological: Incision site of the LEFT foot noted with intact sutures, maceration to the central surgical site, no dehiscence, mild thea-incision erythema, no proximal streaking, no fluctuance, no malodor, no signs of infection at this time.    CBC Full  -  ( 06 Dec 2023 09:25 )  WBC Count : 9.07 K/uL  RBC Count : 4.67 M/uL  Hemoglobin : 12.9 g/dL  Hematocrit : 37.7 %  Platelet Count - Automated : 290 K/uL  Mean Cell Volume : 80.7 fl  Mean Cell Hemoglobin : 27.6 pg  Mean Cell Hemoglobin Concentration : 34.2 gm/dL  Auto Neutrophil # : 6.29 K/uL  Auto Lymphocyte # : 1.48 K/uL  Auto Monocyte # : 1.01 K/uL  Auto Eosinophil # : 0.20 K/uL  Auto Basophil # : 0.06 K/uL  Auto Neutrophil % : 69.4 %  Auto Lymphocyte % : 16.3 %  Auto Monocyte % : 11.1 %  Auto Eosinophil % : 2.2 %  Auto Basophil % : 0.7 %      12-06    133<L>  |  94<L>  |  10  ----------------------------<  252<H>  3.8   |  30  |  1.20    Ca    9.2      06 Dec 2023 09:25  Phos  2.8     12-05  Mg     1.7     12-05    TPro  7.5  /  Alb  3.0<L>  /  TBili  0.7  /  DBili  x   /  AST  10<L>  /  ALT  12  /  AlkPhos  115  12-06                          12.9   9.07  )-----------( 290      ( 06 Dec 2023 09:25 )             37.7     PT/INR - ( 05 Dec 2023 08:00 )   PT: 12.2 sec;   INR: 1.04 ratio         PTT - ( 05 Dec 2023 08:00 )  PTT:31.3 sec      Culture - Tissue with Gram Stain (collected 05 Dec 2023 11:00)  Source: .Tissue Other, LEFT FOOT 4TH METATARSO HEAD  Gram Stain (05 Dec 2023 22:43):    Rare polymorphonuclear leukocytes per low power field    Few Gram positive cocci in pairs per oil power field  Preliminary Report (06 Dec 2023 14:23):    Rare Klebsiella pneumoniae    Few Streptococcus mitis/oralis group "Susceptibilities not performed"    Culture - Tissue with Gram Stain (collected 05 Dec 2023 11:00)  Source: .Tissue Other, LEFT FOOT 4TH TOE BONE CULTURE  Gram Stain (05 Dec 2023 22:43):    Rare polymorphonuclear leukocytes per low power field    Numerous Gram Negative Rods per oil power field    Numerous Gram positive cocci in pairs per oil power field    Few Gram Positive Rods per oil power field  Preliminary Report (06 Dec 2023 14:23):    Few Klebsiella pneumoniae    Moderate Streptococcus mitis/oralis group "Susceptibilities not performed"    Culture - Blood (collected 04 Dec 2023 13:00)  Source: .Blood Blood-Peripheral  Preliminary Report (05 Dec 2023 19:01):    No growth at 24 hours    Culture - Blood (collected 04 Dec 2023 12:41)  Source: .Blood Blood-Peripheral  Preliminary Report (05 Dec 2023 19:01):    No growth at 24 hours        Imaging: ----------  s/p left 4th digit and 4th metatarsal head resection

## 2023-12-07 LAB
-  AMOXICILLIN/CLAVULANIC ACID: SIGNIFICANT CHANGE UP
-  AMPICILLIN/SULBACTAM: SIGNIFICANT CHANGE UP
-  AMPICILLIN: SIGNIFICANT CHANGE UP
-  AZTREONAM: SIGNIFICANT CHANGE UP
-  CEFAZOLIN: SIGNIFICANT CHANGE UP
-  CEFEPIME: SIGNIFICANT CHANGE UP
-  CEFOXITIN: SIGNIFICANT CHANGE UP
-  CEFTRIAXONE: SIGNIFICANT CHANGE UP
-  CIPROFLOXACIN: SIGNIFICANT CHANGE UP
-  ERTAPENEM: SIGNIFICANT CHANGE UP
-  GENTAMICIN: SIGNIFICANT CHANGE UP
-  IMIPENEM: SIGNIFICANT CHANGE UP
-  LEVOFLOXACIN: SIGNIFICANT CHANGE UP
-  MEROPENEM: SIGNIFICANT CHANGE UP
-  PIPERACILLIN/TAZOBACTAM: SIGNIFICANT CHANGE UP
-  TOBRAMYCIN: SIGNIFICANT CHANGE UP
-  TRIMETHOPRIM/SULFAMETHOXAZOLE: SIGNIFICANT CHANGE UP
ANION GAP SERPL CALC-SCNC: 5 MMOL/L — SIGNIFICANT CHANGE UP (ref 5–17)
ANION GAP SERPL CALC-SCNC: 5 MMOL/L — SIGNIFICANT CHANGE UP (ref 5–17)
BUN SERPL-MCNC: 15 MG/DL — SIGNIFICANT CHANGE UP (ref 7–23)
BUN SERPL-MCNC: 15 MG/DL — SIGNIFICANT CHANGE UP (ref 7–23)
CALCIUM SERPL-MCNC: 9.7 MG/DL — SIGNIFICANT CHANGE UP (ref 8.5–10.1)
CALCIUM SERPL-MCNC: 9.7 MG/DL — SIGNIFICANT CHANGE UP (ref 8.5–10.1)
CHLORIDE SERPL-SCNC: 99 MMOL/L — SIGNIFICANT CHANGE UP (ref 96–108)
CHLORIDE SERPL-SCNC: 99 MMOL/L — SIGNIFICANT CHANGE UP (ref 96–108)
CO2 SERPL-SCNC: 30 MMOL/L — SIGNIFICANT CHANGE UP (ref 22–31)
CO2 SERPL-SCNC: 30 MMOL/L — SIGNIFICANT CHANGE UP (ref 22–31)
CREAT SERPL-MCNC: 1.2 MG/DL — SIGNIFICANT CHANGE UP (ref 0.5–1.3)
CREAT SERPL-MCNC: 1.2 MG/DL — SIGNIFICANT CHANGE UP (ref 0.5–1.3)
CULTURE RESULTS: ABNORMAL
EGFR: 63 ML/MIN/1.73M2 — SIGNIFICANT CHANGE UP
EGFR: 63 ML/MIN/1.73M2 — SIGNIFICANT CHANGE UP
GLUCOSE SERPL-MCNC: 222 MG/DL — HIGH (ref 70–99)
GLUCOSE SERPL-MCNC: 222 MG/DL — HIGH (ref 70–99)
HCT VFR BLD CALC: 39.7 % — SIGNIFICANT CHANGE UP (ref 39–50)
HCT VFR BLD CALC: 39.7 % — SIGNIFICANT CHANGE UP (ref 39–50)
HGB BLD-MCNC: 13.5 G/DL — SIGNIFICANT CHANGE UP (ref 13–17)
HGB BLD-MCNC: 13.5 G/DL — SIGNIFICANT CHANGE UP (ref 13–17)
MCHC RBC-ENTMCNC: 27.4 PG — SIGNIFICANT CHANGE UP (ref 27–34)
MCHC RBC-ENTMCNC: 27.4 PG — SIGNIFICANT CHANGE UP (ref 27–34)
MCHC RBC-ENTMCNC: 34 GM/DL — SIGNIFICANT CHANGE UP (ref 32–36)
MCHC RBC-ENTMCNC: 34 GM/DL — SIGNIFICANT CHANGE UP (ref 32–36)
MCV RBC AUTO: 80.5 FL — SIGNIFICANT CHANGE UP (ref 80–100)
MCV RBC AUTO: 80.5 FL — SIGNIFICANT CHANGE UP (ref 80–100)
METHOD TYPE: SIGNIFICANT CHANGE UP
NRBC # BLD: 0 /100 WBCS — SIGNIFICANT CHANGE UP (ref 0–0)
NRBC # BLD: 0 /100 WBCS — SIGNIFICANT CHANGE UP (ref 0–0)
ORGANISM # SPEC MICROSCOPIC CNT: ABNORMAL
ORGANISM # SPEC MICROSCOPIC CNT: SIGNIFICANT CHANGE UP
PLATELET # BLD AUTO: 204 K/UL — SIGNIFICANT CHANGE UP (ref 150–400)
PLATELET # BLD AUTO: 204 K/UL — SIGNIFICANT CHANGE UP (ref 150–400)
POTASSIUM SERPL-MCNC: 4.8 MMOL/L — SIGNIFICANT CHANGE UP (ref 3.5–5.3)
POTASSIUM SERPL-MCNC: 4.8 MMOL/L — SIGNIFICANT CHANGE UP (ref 3.5–5.3)
POTASSIUM SERPL-SCNC: 4.8 MMOL/L — SIGNIFICANT CHANGE UP (ref 3.5–5.3)
POTASSIUM SERPL-SCNC: 4.8 MMOL/L — SIGNIFICANT CHANGE UP (ref 3.5–5.3)
RBC # BLD: 4.93 M/UL — SIGNIFICANT CHANGE UP (ref 4.2–5.8)
RBC # BLD: 4.93 M/UL — SIGNIFICANT CHANGE UP (ref 4.2–5.8)
RBC # FLD: 12.4 % — SIGNIFICANT CHANGE UP (ref 10.3–14.5)
RBC # FLD: 12.4 % — SIGNIFICANT CHANGE UP (ref 10.3–14.5)
SODIUM SERPL-SCNC: 134 MMOL/L — LOW (ref 135–145)
SODIUM SERPL-SCNC: 134 MMOL/L — LOW (ref 135–145)
SPECIMEN SOURCE: SIGNIFICANT CHANGE UP
WBC # BLD: 9.85 K/UL — SIGNIFICANT CHANGE UP (ref 3.8–10.5)
WBC # BLD: 9.85 K/UL — SIGNIFICANT CHANGE UP (ref 3.8–10.5)
WBC # FLD AUTO: 9.85 K/UL — SIGNIFICANT CHANGE UP (ref 3.8–10.5)
WBC # FLD AUTO: 9.85 K/UL — SIGNIFICANT CHANGE UP (ref 3.8–10.5)

## 2023-12-07 PROCEDURE — 99024 POSTOP FOLLOW-UP VISIT: CPT

## 2023-12-07 PROCEDURE — 99232 SBSQ HOSP IP/OBS MODERATE 35: CPT

## 2023-12-07 RX ORDER — CEFTRIAXONE 500 MG/1
2000 INJECTION, POWDER, FOR SOLUTION INTRAMUSCULAR; INTRAVENOUS EVERY 24 HOURS
Refills: 0 | Status: DISCONTINUED | OUTPATIENT
Start: 2023-12-07 | End: 2023-12-08

## 2023-12-07 RX ADMIN — MUPIROCIN 1 APPLICATION(S): 20 OINTMENT TOPICAL at 17:09

## 2023-12-07 RX ADMIN — Medication 6: at 07:48

## 2023-12-07 RX ADMIN — Medication 25 MILLIGRAM(S): at 05:42

## 2023-12-07 RX ADMIN — Medication 2: at 21:40

## 2023-12-07 RX ADMIN — Medication 100 MILLIGRAM(S): at 05:42

## 2023-12-07 RX ADMIN — Medication 2: at 16:58

## 2023-12-07 RX ADMIN — HEPARIN SODIUM 5000 UNIT(S): 5000 INJECTION INTRAVENOUS; SUBCUTANEOUS at 13:17

## 2023-12-07 RX ADMIN — MUPIROCIN 1 APPLICATION(S): 20 OINTMENT TOPICAL at 05:42

## 2023-12-07 RX ADMIN — HEPARIN SODIUM 5000 UNIT(S): 5000 INJECTION INTRAVENOUS; SUBCUTANEOUS at 21:14

## 2023-12-07 RX ADMIN — HEPARIN SODIUM 5000 UNIT(S): 5000 INJECTION INTRAVENOUS; SUBCUTANEOUS at 05:43

## 2023-12-07 RX ADMIN — Medication 8: at 12:14

## 2023-12-07 RX ADMIN — Medication 50 MICROGRAM(S): at 05:42

## 2023-12-07 RX ADMIN — CEFTRIAXONE 100 MILLIGRAM(S): 500 INJECTION, POWDER, FOR SOLUTION INTRAMUSCULAR; INTRAVENOUS at 13:21

## 2023-12-07 RX ADMIN — ATORVASTATIN CALCIUM 10 MILLIGRAM(S): 80 TABLET, FILM COATED ORAL at 21:14

## 2023-12-07 RX ADMIN — LOSARTAN POTASSIUM 100 MILLIGRAM(S): 100 TABLET, FILM COATED ORAL at 05:42

## 2023-12-07 NOTE — PROGRESS NOTE ADULT - PROBLEM SELECTOR PROBLEM 2
Diabetic ulcer of left foot
Sinus bradycardia
Diabetic ulcer of left foot
Sinus bradycardia
Sinus bradycardia

## 2023-12-07 NOTE — PROGRESS NOTE ADULT - PROBLEM SELECTOR PLAN 1
-sent in by wound care for evaluation for worsening left 4th toe wound  -WBC 11.96, afebrile, does not meet sepsis criteria at this time  -MRI: Plantar soft tissue wound present at the level of the first metatarsal phalangeal joint without underlying osteomyelitis   -s/p cefazolin 1000mg x1, Acetominophen 650mg  -podiatry consulted, f/u recommendations  -continue IV ceftriaxone per ID recs  -POD#2 s/p amputation of L fourth toe and metatarsal head, f/u path results- mild chronic OM on proximal wound, will need extended course of IV abx per ID, PICC line order placed

## 2023-12-07 NOTE — PROGRESS NOTE ADULT - SUBJECTIVE AND OBJECTIVE BOX
73y year old Male seen at [facility] 2NOR 239 D1 s/ps/p left 4th digit and 4th metatarsal head resection DOS 12/5/23. Patient relates no overnight events and states that they are doing well at this time.  Denies any fever, chills, nausea, vomiting, chest pain, shortness of breath, or calf pain at this time.      Allergies    No Known Allergies    Intolerances    MEDICATIONS  (STANDING):  atorvastatin 10 milliGRAM(s) Oral at bedtime  cefTRIAXone   IVPB 2000 milliGRAM(s) IV Intermittent every 24 hours  dextrose 50% Injectable 25 Gram(s) IV Push once  dextrose 50% Injectable 12.5 Gram(s) IV Push once  dextrose 50% Injectable 25 Gram(s) IV Push once  glucagon  Injectable 1 milliGRAM(s) IntraMuscular once  heparin   Injectable 5000 Unit(s) SubCutaneous every 8 hours  hydrochlorothiazide 12.5 milliGRAM(s) Oral once  influenza  Vaccine (HIGH DOSE) 0.7 milliLiter(s) IntraMuscular once  insulin lispro (ADMELOG) corrective regimen sliding scale   SubCutaneous three times a day before meals  insulin lispro (ADMELOG) corrective regimen sliding scale   SubCutaneous at bedtime  levothyroxine 50 MICROGram(s) Oral daily  losartan 100 milliGRAM(s) Oral daily  metoprolol succinate ER 25 milliGRAM(s) Oral daily  mupirocin 2% Nasal 1 Application(s) Both Nostrils two times a day    MEDICATIONS  (PRN):  acetaminophen     Tablet .. 650 milliGRAM(s) Oral every 6 hours PRN Temp greater or equal to 38C (100.4F), Mild Pain (1 - 3)  dextrose Oral Gel 15 Gram(s) Oral once PRN Blood Glucose LESS THAN 70 milliGRAM(s)/deciliter  melatonin 3 milliGRAM(s) Oral at bedtime PRN Insomnia      Vital Signs Last 24 Hrs  T(C): 36.8 (07 Dec 2023 12:06), Max: 36.8 (07 Dec 2023 12:06)  T(F): 98.2 (07 Dec 2023 12:06), Max: 98.2 (07 Dec 2023 12:06)  HR: 68 (07 Dec 2023 12:06) (57 - 68)  BP: 127/68 (07 Dec 2023 12:06) (127/68 - 198/80)  BP(mean): --  RR: 17 (07 Dec 2023 12:06) (17 - 18)  SpO2: 98% (07 Dec 2023 12:06) (95% - 98%)    Parameters below as of 07 Dec 2023 12:06  Patient On (Oxygen Delivery Method): room air        PHYSICAL EXAM:  Vascular: DP & PT 1/4  palpable bilaterally, Capillary refill 3 seconds, Digital hair absent bilaterally  Neurological: Light touch sensation intact bilaterally  Musculoskeletal: 5/5 strength in all quadrants bilaterally, AJ & STJ ROM intact, s/p left 4th digit and 4th metatarsal head resection   Dermatological: Incision site of the LEFT foot noted with intact sutures, maceration to the central surgical site, no dehiscence, mild thea-incision erythema, no proximal streaking, no fluctuance, no malodor, no signs of infection at this time.    CBC Full  -  ( 07 Dec 2023 08:55 )  WBC Count : 9.85 K/uL  RBC Count : 4.93 M/uL  Hemoglobin : 13.5 g/dL  Hematocrit : 39.7 %  Platelet Count - Automated : 204 K/uL  Mean Cell Volume : 80.5 fl  Mean Cell Hemoglobin : 27.4 pg  Mean Cell Hemoglobin Concentration : 34.0 gm/dL  Auto Neutrophil # : x  Auto Lymphocyte # : x  Auto Monocyte # : x  Auto Eosinophil # : x  Auto Basophil # : x  Auto Neutrophil % : x  Auto Lymphocyte % : x  Auto Monocyte % : x  Auto Eosinophil % : x  Auto Basophil % : x    12-07    134<L>  |  99  |  15  ----------------------------<  222<H>  4.8   |  30  |  1.20    Ca    9.7      07 Dec 2023 08:55    TPro  7.5  /  Alb  3.0<L>  /  TBili  0.7  /  DBili  x   /  AST  10<L>  /  ALT  12  /  AlkPhos  115  12-06                          13.5   9.85  )-----------( 204      ( 07 Dec 2023 08:55 )             39.7       Culture - Tissue with Gram Stain (collected 05 Dec 2023 11:00)  Source: .Tissue Other, LEFT FOOT 4TH METATARSO HEAD  Gram Stain (05 Dec 2023 22:43):    Rare polymorphonuclear leukocytes per low power field    Few Gram positive cocci in pairs per oil power field  Final Report (07 Dec 2023 11:48):    Rare Klebsiella pneumoniae    Few Streptococcus mitis/oralis group "Susceptibilities not performed"    Few Prevotella bivia "Susceptibilities not performed"    Few Bacteroides fragilis "Susceptibilities not performed"  Organism: Klebsiella pneumoniae (07 Dec 2023 11:48)  Organism: Klebsiella pneumoniae (07 Dec 2023 11:48)    Culture - Tissue with Gram Stain (collected 05 Dec 2023 11:00)  Source: .Tissue Other, LEFT FOOT 4TH TOE BONE CULTURE  Gram Stain (05 Dec 2023 22:43):    Rare polymorphonuclear leukocytes per low power field    Numerous Gram Negative Rods per oil power field    Numerous Gram positive cocci in pairs per oil power field    Few Gram Positive Rods per oil power field  Final Report (07 Dec 2023 11:46):    Few Klebsiella pneumoniae    Moderate Streptococcus mitis/oralis group "Susceptibilities not performed"    Few Prevotella bivia "Susceptibilities not performed"    Rare Bacteroides fragilis "Susceptibilities not performed"  Organism: Klebsiella pneumoniae (07 Dec 2023 11:46)  Organism: Klebsiella pneumoniae (07 Dec 2023 11:46)    Surgical Pathology Report - Auth (Verified)    Specimen(s) Submitted  1  Left foot fourth toe bone pathology  2  Left foot fourth metatarsal head pathology  3  Left foot fourth metatarsal proximal margin pathology    Final Diagnosis  1. Left foot fourth toe bone pathology  Acute and chronic osteomyelitis.    Skin with ulcer and granulation tissue and soft tissue with acute and  chronic inflammation.    2. Left foot fourth metatarsal head pathology  Mild chronic osteomyelitis.    3. Left foot fourth metatarsal proximal margin pathology  Focal mild chronic osteomyelitis.  Verified by: Arash Choi MD  (Electronic Signature)  Reported on: 12/07/23 12:26 [facility], [address] [place] [id number]  _________________________________________________________________      Imaging: ----------  s/p 4th digit and 4th metatarsal head resection  73y year old Male seen at [facility] 2NOR 239 D1 s/ps/p left 4th digit and 4th metatarsal head resection DOS 12/5/23. Patient relates no overnight events and states that they are doing well at this time.  Denies any fever, chills, nausea, vomiting, chest pain, shortness of breath, or calf pain at this time.      Allergies    No Known Allergies    Intolerances    MEDICATIONS  (STANDING):  atorvastatin 10 milliGRAM(s) Oral at bedtime  cefTRIAXone   IVPB 2000 milliGRAM(s) IV Intermittent every 24 hours  dextrose 50% Injectable 25 Gram(s) IV Push once  dextrose 50% Injectable 12.5 Gram(s) IV Push once  dextrose 50% Injectable 25 Gram(s) IV Push once  glucagon  Injectable 1 milliGRAM(s) IntraMuscular once  heparin   Injectable 5000 Unit(s) SubCutaneous every 8 hours  hydrochlorothiazide 12.5 milliGRAM(s) Oral once  influenza  Vaccine (HIGH DOSE) 0.7 milliLiter(s) IntraMuscular once  insulin lispro (ADMELOG) corrective regimen sliding scale   SubCutaneous three times a day before meals  insulin lispro (ADMELOG) corrective regimen sliding scale   SubCutaneous at bedtime  levothyroxine 50 MICROGram(s) Oral daily  losartan 100 milliGRAM(s) Oral daily  metoprolol succinate ER 25 milliGRAM(s) Oral daily  mupirocin 2% Nasal 1 Application(s) Both Nostrils two times a day    MEDICATIONS  (PRN):  acetaminophen     Tablet .. 650 milliGRAM(s) Oral every 6 hours PRN Temp greater or equal to 38C (100.4F), Mild Pain (1 - 3)  dextrose Oral Gel 15 Gram(s) Oral once PRN Blood Glucose LESS THAN 70 milliGRAM(s)/deciliter  melatonin 3 milliGRAM(s) Oral at bedtime PRN Insomnia      Vital Signs Last 24 Hrs  T(C): 36.8 (07 Dec 2023 12:06), Max: 36.8 (07 Dec 2023 12:06)  T(F): 98.2 (07 Dec 2023 12:06), Max: 98.2 (07 Dec 2023 12:06)  HR: 68 (07 Dec 2023 12:06) (57 - 68)  BP: 127/68 (07 Dec 2023 12:06) (127/68 - 198/80)  BP(mean): --  RR: 17 (07 Dec 2023 12:06) (17 - 18)  SpO2: 98% (07 Dec 2023 12:06) (95% - 98%)    Parameters below as of 07 Dec 2023 12:06  Patient On (Oxygen Delivery Method): room air        PHYSICAL EXAM:  Vascular: DP & PT 1/4  palpable bilaterally, Capillary refill 3 seconds, Digital hair absent bilaterally  Neurological: Light touch sensation intact bilaterally  Musculoskeletal: 5/5 strength in all quadrants bilaterally, AJ & STJ ROM intact, s/p left 4th digit and 4th metatarsal head resection   Dermatological: Incision site of the LEFT foot noted with intact sutures, maceration to the central surgical site, no dehiscence, mild thea-incision erythema, no proximal streaking, no fluctuance, no malodor, no signs of infection at this time.    CBC Full  -  ( 07 Dec 2023 08:55 )  WBC Count : 9.85 K/uL  RBC Count : 4.93 M/uL  Hemoglobin : 13.5 g/dL  Hematocrit : 39.7 %  Platelet Count - Automated : 204 K/uL  Mean Cell Volume : 80.5 fl  Mean Cell Hemoglobin : 27.4 pg  Mean Cell Hemoglobin Concentration : 34.0 gm/dL  Auto Neutrophil # : x  Auto Lymphocyte # : x  Auto Monocyte # : x  Auto Eosinophil # : x  Auto Basophil # : x  Auto Neutrophil % : x  Auto Lymphocyte % : x  Auto Monocyte % : x  Auto Eosinophil % : x  Auto Basophil % : x    12-07    134<L>  |  99  |  15  ----------------------------<  222<H>  4.8   |  30  |  1.20    Ca    9.7      07 Dec 2023 08:55    TPro  7.5  /  Alb  3.0<L>  /  TBili  0.7  /  DBili  x   /  AST  10<L>  /  ALT  12  /  AlkPhos  115  12-06                          13.5   9.85  )-----------( 204      ( 07 Dec 2023 08:55 )             39.7       Culture - Tissue with Gram Stain (collected 05 Dec 2023 11:00)  Source: .Tissue Other, LEFT FOOT 4TH METATARSO HEAD  Gram Stain (05 Dec 2023 22:43):    Rare polymorphonuclear leukocytes per low power field    Few Gram positive cocci in pairs per oil power field  Final Report (07 Dec 2023 11:48):    Rare Klebsiella pneumoniae    Few Streptococcus mitis/oralis group "Susceptibilities not performed"    Few Prevotella bivia "Susceptibilities not performed"    Few Bacteroides fragilis "Susceptibilities not performed"  Organism: Klebsiella pneumoniae (07 Dec 2023 11:48)  Organism: Klebsiella pneumoniae (07 Dec 2023 11:48)    Culture - Tissue with Gram Stain (collected 05 Dec 2023 11:00)  Source: .Tissue Other, LEFT FOOT 4TH TOE BONE CULTURE  Gram Stain (05 Dec 2023 22:43):    Rare polymorphonuclear leukocytes per low power field    Numerous Gram Negative Rods per oil power field    Numerous Gram positive cocci in pairs per oil power field    Few Gram Positive Rods per oil power field  Final Report (07 Dec 2023 11:46):    Few Klebsiella pneumoniae    Moderate Streptococcus mitis/oralis group "Susceptibilities not performed"    Few Prevotella bivia "Susceptibilities not performed"    Rare Bacteroides fragilis "Susceptibilities not performed"  Organism: Klebsiella pneumoniae (07 Dec 2023 11:46)  Organism: Klebsiella pneumoniae (07 Dec 2023 11:46)    Surgical Pathology Report - Auth (Verified)    Specimen(s) Submitted  1  Left foot fourth toe bone pathology  2  Left foot fourth metatarsal head pathology  3  Left foot fourth metatarsal proximal margin pathology    Final Diagnosis  1. Left foot fourth toe bone pathology  Acute and chronic osteomyelitis.    Skin with ulcer and granulation tissue and soft tissue with acute and  chronic inflammation.    2. Left foot fourth metatarsal head pathology  Mild chronic osteomyelitis.    3. Left foot fourth metatarsal proximal margin pathology  Focal mild chronic osteomyelitis.  Verified by: Arash Choi MD  (Electronic Signature)  Reported on: 12/07/23 12:26 [facility], [address] [place] [id number]  _________________________________________________________________      Imaging: ----------  s/p 4th digit and 4th metatarsal head resection

## 2023-12-07 NOTE — PROGRESS NOTE ADULT - ASSESSMENT
73 yoM with PMH of HTN, DM type 2 (on insulin), Hypothyroidism, and hyperlipidemia presents to the ED by wound care for evaluation of left 4th toe wound    pre/post optimization, HTN   - s/p amputation of 4th toe and metatarsal bone of left foot (12/5), ortho following    - BP uncontrolled 130-170's per flow sheet, HR 50-70's   - Continue metoprolol succinate 25 mg daily   - continue HCTZ 12.5 mg PO daily   - continue Losartan 100 mg PO daily   - would start hydralazine 25 mg PO TID, uptitrate PRN for better BP control   - continue to monitor routine hemodynamics  - pain control per primary     - EKG sinus bradycardia, rate 55  - no prior EKG or cardiac records available  - No clear evidence of acute ischemia  - continue home atorvastatin 10 mg  - no anginal complaints   - Monitor and replete Lytes. Keep K > 4 and Mg > 2    - No meaningful evidence of volume overload. trace LE edema  - can obtain TTE while admitted, No prior TTE available    - Will continue to follow.    Domi Biggs [facility]  Nurse Practitioner - Cardiology   call TEAMS       73 yoM with PMH of HTN, DM type 2 (on insulin), Hypothyroidism, and hyperlipidemia presents to the ED by wound care for evaluation of left 4th toe wound    pre/post optimization, HTN   - s/p amputation of 4th toe and metatarsal bone of left foot (12/5), ortho following    - BP uncontrolled 130-170's per flow sheet, HR 50-70's   - Continue metoprolol succinate 25 mg daily   - continue HCTZ 12.5 mg PO daily   - continue Losartan 100 mg PO daily   - would start hydralazine 25 mg PO TID, uptitrate PRN for better BP control   - continue to monitor routine hemodynamics  - pain control per primary     - EKG sinus bradycardia, rate 55  - no prior EKG or cardiac records available  - No clear evidence of acute ischemia  - continue home atorvastatin 10 mg  - no anginal complaints   - Monitor and replete Lytes. Keep K > 4 and Mg > 2    - No meaningful evidence of volume overload. trace LE edema  - can obtain TTE while admitted, No prior TTE available    - Will continue to follow.    Domi Biggs [facility]  Nurse Practitioner - Cardiology   call TEAMS

## 2023-12-07 NOTE — PROGRESS NOTE ADULT - SUBJECTIVE AND OBJECTIVE BOX
[facility] -- Roland Khoury Pannella, Patel, Savella, Goodger, Cohen  Office # [phone]    Follow Up:  HTN, post op    Subjective/Observations: seen and examined, awake, alert in bed, denies chest pain, dyspnea, palpitations or dizziness, orthopnea and PND. Tolerating room air.    REVIEW OF SYSTEMS: All other review of systems is negative unless indicated above  PAST MEDICAL & SURGICAL HISTORY:  Hypertension      Diabetes mellitus      Hypothyroidism      Hyperlipidemia      No significant past surgical history        MEDICATIONS  (STANDING):  atorvastatin 10 milliGRAM(s) Oral at bedtime  cefTRIAXone   IVPB 2000 milliGRAM(s) IV Intermittent every 24 hours  dextrose 50% Injectable 25 Gram(s) IV Push once  dextrose 50% Injectable 12.5 Gram(s) IV Push once  dextrose 50% Injectable 25 Gram(s) IV Push once  glucagon  Injectable 1 milliGRAM(s) IntraMuscular once  heparin   Injectable 5000 Unit(s) SubCutaneous every 8 hours  hydrochlorothiazide 12.5 milliGRAM(s) Oral daily  influenza  Vaccine (HIGH DOSE) 0.7 milliLiter(s) IntraMuscular once  insulin lispro (ADMELOG) corrective regimen sliding scale   SubCutaneous three times a day before meals  insulin lispro (ADMELOG) corrective regimen sliding scale   SubCutaneous at bedtime  levothyroxine 50 MICROGram(s) Oral daily  losartan 100 milliGRAM(s) Oral daily  metoprolol succinate ER 25 milliGRAM(s) Oral daily  mupirocin 2% Nasal 1 Application(s) Both Nostrils two times a day    MEDICATIONS  (PRN):  acetaminophen     Tablet .. 650 milliGRAM(s) Oral every 6 hours PRN Temp greater or equal to 38C (100.4F), Mild Pain (1 - 3)  dextrose Oral Gel 15 Gram(s) Oral once PRN Blood Glucose LESS THAN 70 milliGRAM(s)/deciliter  melatonin 3 milliGRAM(s) Oral at bedtime PRN Insomnia    Allergies    No Known Allergies    Intolerances      Vital Signs Last 24 Hrs  T(C): 36.7 (07 Dec 2023 05:37), Max: 36.7 (07 Dec 2023 05:37)  T(F): 98 (07 Dec 2023 05:37), Max: 98 (07 Dec 2023 05:37)  HR: 57 (07 Dec 2023 06:46) (57 - 76)  BP: 177/76 (07 Dec 2023 06:46) (112/67 - 198/80)  BP(mean): --  RR: 17 (07 Dec 2023 05:37) (17 - 18)  SpO2: 96% (07 Dec 2023 05:37) (95% - 96%)    Parameters below as of 07 Dec 2023 05:37  Patient On (Oxygen Delivery Method): room air      I&O's Summary        TELE: Not on telemetry   PHYSICAL EXAM:  Constitutional: NAD, awake and alert  HEENT: Moist Mucous Membranes, Anicteric  Pulmonary: Non-labored, breath sounds are clear bilaterally, No wheezing, rales or rhonchi  Cardiovascular: Regular, S1 and S2, No murmurs, rubs, gallops or clicks  Gastrointestinal: Bowel Sounds present, soft, nontender.   Lymph: trace peripheral edema. No lymphadenopathy.  Skin: No visible rashes or ulcers.  Psych:  Mood & affect appropriate  LABS: All Labs Reviewed:                      13.5   9.85  )-----------( 204      ( 07 Dec 2023 08:55 )             39.7                         12.9   9.07  )-----------( 290      ( 06 Dec 2023 09:25 )             37.7                         14.0   9.65  )-----------( 272      ( 05 Dec 2023 08:00 )             39.7     07 Dec 2023 08:55    134    |  99     |  15     ----------------------------<  222    4.8     |  30     |  1.20   06 Dec 2023 09:25    133    |  94     |  10     ----------------------------<  252    3.8     |  30     |  1.20   05 Dec 2023 08:00    136    |  97     |  11     ----------------------------<  182    4.0     |  29     |  1.20     Ca    9.7        07 Dec 2023 08:55  Ca    9.2        06 Dec 2023 09:25  Ca    9.1        05 Dec 2023 08:00  Phos  2.8       05 Dec 2023 08:00  Mg     1.7       05 Dec 2023 08:00    TPro  7.5    /  Alb  3.0    /  TBili  0.7    /  DBili  x      /  AST  10     /  ALT  12     /  AlkPhos  115    06 Dec 2023 09:25  TPro  7.6    /  Alb  3.2    /  TBili  0.9    /  DBili  x      /  AST  16     /  ALT  15     /  AlkPhos  122    05 Dec 2023 08:00  TPro  8.5    /  Alb  3.6    /  TBili  0.7    /  DBili  x      /  AST  19     /  ALT  21     /  AlkPhos  132    04 Dec 2023 12:57          12 Lead ECG:   Ventricular Rate 55 BPM    Atrial Rate 55 BPM    P-R Interval 150 ms    QRS Duration 90 ms    Q-T Interval 434 ms    QTC Calculation(Bazett) 415 ms    P Axis 59 degrees    R Axis -17 degrees    T Axis 20 degrees    Diagnosis Line Sinus bradycardia  Otherwise normal ECG  No previous ECGs available  Confirmed by deena Goff ([id number]) on 12/5/2023 1:27:39 PM (12-04-23 @ 14:12)        [facility] -- Roland Khoury Pannella, Patel, Savella, Goodger, Cohen  Office # [phone]    Follow Up:  HTN, post op    Subjective/Observations: seen and examined, awake, alert in bed, denies chest pain, dyspnea, palpitations or dizziness, orthopnea and PND. Tolerating room air.    REVIEW OF SYSTEMS: All other review of systems is negative unless indicated above  PAST MEDICAL & SURGICAL HISTORY:  Hypertension      Diabetes mellitus      Hypothyroidism      Hyperlipidemia      No significant past surgical history        MEDICATIONS  (STANDING):  atorvastatin 10 milliGRAM(s) Oral at bedtime  cefTRIAXone   IVPB 2000 milliGRAM(s) IV Intermittent every 24 hours  dextrose 50% Injectable 25 Gram(s) IV Push once  dextrose 50% Injectable 12.5 Gram(s) IV Push once  dextrose 50% Injectable 25 Gram(s) IV Push once  glucagon  Injectable 1 milliGRAM(s) IntraMuscular once  heparin   Injectable 5000 Unit(s) SubCutaneous every 8 hours  hydrochlorothiazide 12.5 milliGRAM(s) Oral daily  influenza  Vaccine (HIGH DOSE) 0.7 milliLiter(s) IntraMuscular once  insulin lispro (ADMELOG) corrective regimen sliding scale   SubCutaneous three times a day before meals  insulin lispro (ADMELOG) corrective regimen sliding scale   SubCutaneous at bedtime  levothyroxine 50 MICROGram(s) Oral daily  losartan 100 milliGRAM(s) Oral daily  metoprolol succinate ER 25 milliGRAM(s) Oral daily  mupirocin 2% Nasal 1 Application(s) Both Nostrils two times a day    MEDICATIONS  (PRN):  acetaminophen     Tablet .. 650 milliGRAM(s) Oral every 6 hours PRN Temp greater or equal to 38C (100.4F), Mild Pain (1 - 3)  dextrose Oral Gel 15 Gram(s) Oral once PRN Blood Glucose LESS THAN 70 milliGRAM(s)/deciliter  melatonin 3 milliGRAM(s) Oral at bedtime PRN Insomnia    Allergies    No Known Allergies    Intolerances      Vital Signs Last 24 Hrs  T(C): 36.7 (07 Dec 2023 05:37), Max: 36.7 (07 Dec 2023 05:37)  T(F): 98 (07 Dec 2023 05:37), Max: 98 (07 Dec 2023 05:37)  HR: 57 (07 Dec 2023 06:46) (57 - 76)  BP: 177/76 (07 Dec 2023 06:46) (112/67 - 198/80)  BP(mean): --  RR: 17 (07 Dec 2023 05:37) (17 - 18)  SpO2: 96% (07 Dec 2023 05:37) (95% - 96%)    Parameters below as of 07 Dec 2023 05:37  Patient On (Oxygen Delivery Method): room air      I&O's Summary        TELE: Not on telemetry   PHYSICAL EXAM:  Constitutional: NAD, awake and alert  HEENT: Moist Mucous Membranes, Anicteric  Pulmonary: Non-labored, breath sounds are clear bilaterally, No wheezing, rales or rhonchi  Cardiovascular: Regular, S1 and S2, No murmurs, rubs, gallops or clicks  Gastrointestinal: Bowel Sounds present, soft, nontender.   Lymph: trace peripheral edema. No lymphadenopathy.  Skin: No visible rashes or ulcers.  Psych:  Mood & affect appropriate  LABS: All Labs Reviewed:                      13.5   9.85  )-----------( 204      ( 07 Dec 2023 08:55 )             39.7                         12.9   9.07  )-----------( 290      ( 06 Dec 2023 09:25 )             37.7                         14.0   9.65  )-----------( 272      ( 05 Dec 2023 08:00 )             39.7     07 Dec 2023 08:55    134    |  99     |  15     ----------------------------<  222    4.8     |  30     |  1.20   06 Dec 2023 09:25    133    |  94     |  10     ----------------------------<  252    3.8     |  30     |  1.20   05 Dec 2023 08:00    136    |  97     |  11     ----------------------------<  182    4.0     |  29     |  1.20     Ca    9.7        07 Dec 2023 08:55  Ca    9.2        06 Dec 2023 09:25  Ca    9.1        05 Dec 2023 08:00  Phos  2.8       05 Dec 2023 08:00  Mg     1.7       05 Dec 2023 08:00    TPro  7.5    /  Alb  3.0    /  TBili  0.7    /  DBili  x      /  AST  10     /  ALT  12     /  AlkPhos  115    06 Dec 2023 09:25  TPro  7.6    /  Alb  3.2    /  TBili  0.9    /  DBili  x      /  AST  16     /  ALT  15     /  AlkPhos  122    05 Dec 2023 08:00  TPro  8.5    /  Alb  3.6    /  TBili  0.7    /  DBili  x      /  AST  19     /  ALT  21     /  AlkPhos  132    04 Dec 2023 12:57          12 Lead ECG:   Ventricular Rate 55 BPM    Atrial Rate 55 BPM    P-R Interval 150 ms    QRS Duration 90 ms    Q-T Interval 434 ms    QTC Calculation(Bazett) 415 ms    P Axis 59 degrees    R Axis -17 degrees    T Axis 20 degrees    Diagnosis Line Sinus bradycardia  Otherwise normal ECG  No previous ECGs available  Confirmed by deena Goff ([id number]) on 12/5/2023 1:27:39 PM (12-04-23 @ 14:12)

## 2023-12-07 NOTE — PROGRESS NOTE ADULT - PROBLEM SELECTOR PLAN 1
Patient examined and evaluated.  Surgical site dressed with  Aquacel and dry, sterile dressing.  Patient is to be partial weight bearing to the left heel  in a surgical shoe only for necessary activities - maceration noted to the central surgical shoe   Surgical pathology (+) for OM   Antibiotics as per ID recommendations.  Patient will need home nursing for wound  care     Wound Care Instructions:  -Keep dressing clean, dry, and intact to the left  foot  -Change dressing to the left foot every other day  - Apply silver alginate and cover with sterile gauze, abd pad and neil   -Patient must remain partial weightbearing to the right heel in surgical shoe only for necessary activities    -Monitor for any signs of infection including redness, swelling in the leg above the bandage, nausea/vomiting/fever/chills/chest pain/shortness of breath, if any are present patient must report to the emergency department immediately  -Patient is to follow up with Dr. Staton/Dr. Paz/ Dr. Dobbs  within 5 days after discharge at [facility] [place]. Please call to make an appointment [phone]. Patient examined and evaluated.  Surgical site dressed with  Aquacel and dry, sterile dressing.  Patient is to be partial weight bearing to the left heel  in a surgical shoe only for necessary activities - maceration noted to the central surgical shoe   Surgical pathology (+) for OM   Antibiotics as per ID recommendations.  Patient will need home nursing for wound  care     Wound Care Instructions:  -Keep dressing clean, dry, and intact to the left  foot  -Change dressing to the left foot every other day  - Apply silver alginate and cover with sterile gauze, abd pad and neil   -Patient must remain partial weightbearing to the right heel in surgical shoe only for necessary activities    -Monitor for any signs of infection including redness, swelling in the leg above the bandage, nausea/vomiting/fever/chills/chest pain/shortness of breath, if any are present patient must report to the emergency department immediately  -Patient is to follow up with Dr. Staton/Dr. Paz/ Dr. Dobbs  within 5 days after discharge at [facility] [place]. Please call to make an appointment [phone].

## 2023-12-07 NOTE — PROGRESS NOTE ADULT - SUBJECTIVE AND OBJECTIVE BOX
INTERVAL HPI/OVERNIGHT EVENTS: Patient seen and examined at bedside. States he is feeling well, denies any pain in his L foot. Denies any chest pain, SOB, abd pain. Awaiting path results.    MEDICATIONS  (STANDING):  atorvastatin 10 milliGRAM(s) Oral at bedtime  cefTRIAXone   IVPB 2000 milliGRAM(s) IV Intermittent every 24 hours  dextrose 50% Injectable 25 Gram(s) IV Push once  dextrose 50% Injectable 25 Gram(s) IV Push once  dextrose 50% Injectable 12.5 Gram(s) IV Push once  glucagon  Injectable 1 milliGRAM(s) IntraMuscular once  heparin   Injectable 5000 Unit(s) SubCutaneous every 8 hours  hydrochlorothiazide 12.5 milliGRAM(s) Oral daily  influenza  Vaccine (HIGH DOSE) 0.7 milliLiter(s) IntraMuscular once  insulin lispro (ADMELOG) corrective regimen sliding scale   SubCutaneous three times a day before meals  insulin lispro (ADMELOG) corrective regimen sliding scale   SubCutaneous at bedtime  levothyroxine 50 MICROGram(s) Oral daily  losartan 100 milliGRAM(s) Oral daily  metoprolol succinate ER 25 milliGRAM(s) Oral daily  mupirocin 2% Nasal 1 Application(s) Both Nostrils two times a day    MEDICATIONS  (PRN):  acetaminophen     Tablet .. 650 milliGRAM(s) Oral every 6 hours PRN Temp greater or equal to 38C (100.4F), Mild Pain (1 - 3)  dextrose Oral Gel 15 Gram(s) Oral once PRN Blood Glucose LESS THAN 70 milliGRAM(s)/deciliter  melatonin 3 milliGRAM(s) Oral at bedtime PRN Insomnia      Allergies    No Known Allergies    Intolerances      ROS:   CONSTITUTIONAL: No weakness, fevers or chills  EYES/ENT: No visual changes;  No vertigo or throat pain   NECK: No pain or stiffness  RESPIRATORY: No cough, wheezing, hemoptysis; No shortness of breath  CARDIOVASCULAR: No chest pain or palpitations  GASTROINTESTINAL: No abdominal or epigastric pain. No nausea, vomiting, or hematemesis;   GENITOURINARY: No dysuria, frequency or hematuria  NEUROLOGICAL: No numbness or weakness  SKIN: No itching, burning, rashes, or lesions   All other review of systems is negative unless indicated above.    Vital Signs Last 24 Hrs  T(C): 36.8 (07 Dec 2023 12:06), Max: 36.8 (07 Dec 2023 12:06)  T(F): 98.2 (07 Dec 2023 12:06), Max: 98.2 (07 Dec 2023 12:06)  HR: 68 (07 Dec 2023 12:06) (57 - 68)  BP: 127/68 (07 Dec 2023 12:06) (127/68 - 198/80)  BP(mean): --  RR: 17 (07 Dec 2023 12:06) (17 - 18)  SpO2: 98% (07 Dec 2023 12:06) (95% - 98%)    Parameters below as of 07 Dec 2023 12:06  Patient On (Oxygen Delivery Method): room air        Physical Exam:  General: laying comfortably in bed, NAD  Neurology: A&Ox3, nonfocal, MADRID x 4  Respiratory: CTA B/L  CV: RRR, S1S2, no murmurs, rubs or gallops  Abdominal: Soft, NT, ND +BS  Extremities: L foot wrapped in gauze- dressing C/D/I, + peripheral pulses      LABS:                        13.5   9.85  )-----------( 204      ( 07 Dec 2023 08:55 )             39.7     12-07    134<L>  |  99  |  15  ----------------------------<  222<H>  4.8   |  30  |  1.20    Ca    9.7      07 Dec 2023 08:55    TPro  7.5  /  Alb  3.0<L>  /  TBili  0.7  /  DBili  x   /  AST  10<L>  /  ALT  12  /  AlkPhos  115  12-06      Urinalysis Basic - ( 07 Dec 2023 08:55 )    Color: x / Appearance: x / SG: x / pH: x  Gluc: 222 mg/dL / Ketone: x  / Bili: x / Urobili: x   Blood: x / Protein: x / Nitrite: x   Leuk Esterase: x / RBC: x / WBC x   Sq Epi: x / Non Sq Epi: x / Bacteria: x        RADIOLOGY & ADDITIONAL TESTS:

## 2023-12-07 NOTE — PROGRESS NOTE ADULT - PROBLEM SELECTOR PLAN 6
-chronic condition   -continue atorvastatin
-chronic condition   -continue atorvastatin
-chronic condition   -continue atorvastatin   -f/u AM lipid panel

## 2023-12-07 NOTE — PROGRESS NOTE ADULT - SUBJECTIVE AND OBJECTIVE BOX
[facility]   INFECTIOUS DISEASES   [address] [place] [address]  Tel: [phone]     Fax: [phone]  =========================================================  MD Cedrick Sanz Kaushal, MD Cho, Michelle, MD Sunjit, Jaspal, MD  =========================================================    VALENTIN CERON [id number]    Follow up: Left Foot wound     No new changes, no fever, no acute incidents overnight. No pain.     Allergies:  No Known Allergies      Antibiotics:  acetaminophen     Tablet .. 650 milliGRAM(s) Oral every 6 hours PRN  atorvastatin 10 milliGRAM(s) Oral at bedtime  ceFAZolin   IVPB 2000 milliGRAM(s) IV Intermittent every 8 hours  dextrose 50% Injectable 12.5 Gram(s) IV Push once  dextrose 50% Injectable 25 Gram(s) IV Push once  dextrose 50% Injectable 25 Gram(s) IV Push once  dextrose Oral Gel 15 Gram(s) Oral once PRN  glucagon  Injectable 1 milliGRAM(s) IntraMuscular once  heparin   Injectable 5000 Unit(s) SubCutaneous every 8 hours  hydrochlorothiazide 12.5 milliGRAM(s) Oral daily  influenza  Vaccine (HIGH DOSE) 0.7 milliLiter(s) IntraMuscular once  insulin lispro (ADMELOG) corrective regimen sliding scale   SubCutaneous three times a day before meals  levothyroxine 50 MICROGram(s) Oral daily  losartan 100 milliGRAM(s) Oral daily  melatonin 3 milliGRAM(s) Oral at bedtime PRN  metoprolol succinate ER 25 milliGRAM(s) Oral daily    REVIEW OF SYSTEMS:  CONSTITUTIONAL:  No Fever or chills  HEENT:  No diplopia or blurred vision.  No sore throat or runny nose.  CARDIOVASCULAR:  No chest pain or SOB.  RESPIRATORY:  No cough, shortness of breath, PND or orthopnea.  GASTROINTESTINAL:  No nausea, vomiting or diarrhea.  GENITOURINARY:  No dysuria, frequency or urgency. No Blood in urine  MUSCULOSKELETAL:  no joint aches, no muscle pain  SKIN:  foot ulcer   NEUROLOGIC:  No paresthesias or weakness.  PSYCHIATRIC:  No disorder of thought or mood.  ENDOCRINE:  No heat or cold intolerance, polyuria or polydipsia.  HEMATOLOGICAL:  No easy bruising or bleeding.      Physical Exam:  Vital Signs Last 24 Hrs  T(C): 36.8 (07 Dec 2023 12:06), Max: 36.8 (07 Dec 2023 12:06)  T(F): 98.2 (07 Dec 2023 12:06), Max: 98.2 (07 Dec 2023 12:06)  HR: 68 (07 Dec 2023 12:06) (57 - 68)  BP: 127/68 (07 Dec 2023 12:06) (127/68 - 198/80)  BP(mean): --  RR: 17 (07 Dec 2023 12:06) (17 - 18)  SpO2: 98% (07 Dec 2023 12:06) (95% - 98%)  GEN: NAD  HEENT: normocephalic and atraumatic. EOMI. PERRL.    NECK: Supple.  No lymphadenopathy   LUNGS: Clear to auscultation.  HEART: Regular rate and rhythm without murmur.  ABDOMEN: Soft, nontender, and nondistended.  Positive bowel sounds.    : No CVA tenderness  EXTREMITIES: Without edema.  NEUROLOGIC: grossly intact.  PSYCHIATRIC: Appropriate affect .  SKIN: lateral side of left 4th toe with an open ulcer and priwound erythema with some   serosanguinous discharge          Labs:                        13.5   9.85  )-----------( 204      ( 07 Dec 2023 08:55 )             39.7     12-07    134<L>  |  99  |  15  ----------------------------<  222<H>  4.8   |  30  |  1.20    Ca    9.7      07 Dec 2023 08:55    TPro  7.5  /  Alb  3.0<L>  /  TBili  0.7  /  DBili  x   /  AST  10<L>  /  ALT  12  /  AlkPhos  115  12-06      Culture - Tissue with Gram Stain (collected 12-05-23 @ 11:00)  Source: .Tissue Other, LEFT FOOT 4TH METATARSO HEAD  Gram Stain (12-05-23 @ 22:43):    Rare polymorphonuclear leukocytes per low power field    Few Gram positive cocci in pairs per oil power field  Final Report (12-07-23 @ 11:48):    Rare Klebsiella pneumoniae    Few Streptococcus mitis/oralis group "Susceptibilities not performed"    Few Prevotella bivia "Susceptibilities not performed"    Few Bacteroides fragilis "Susceptibilities not performed"  Organism: Klebsiella pneumoniae (12-07-23 @ 11:48)  Organism: Klebsiella pneumoniae (12-07-23 @ 11:48)    Sensitivities:      -  Levofloxacin: S <=0.5      -  Tobramycin: S <=2      -  Aztreonam: S <=4      -  Gentamicin: S <=2      -  Cefepime: S <=2      -  Cefazolin: S <=2      -  Piperacillin/Tazobactam: S <=8      -  Ciprofloxacin: S <=0.25      -  Imipenem: S <=1      -  Ceftriaxone: S <=1      -  Ampicillin: R >16 These ampicillin results predict results for amoxicillin      Method Type: ROSALIO      -  Meropenem: S <=1      -  Ampicillin/Sulbactam: S 8/4      -  Cefoxitin: S <=8      -  Amoxicillin/Clavulanic Acid: S <=8/4      -  Trimethoprim/Sulfamethoxazole: S <=0.5/9.5      -  Ertapenem: S <=0.5    Culture - Tissue with Gram Stain (collected 12-05-23 @ 11:00)  Source: .Tissue Other, LEFT FOOT 4TH TOE BONE CULTURE  Gram Stain (12-05-23 @ 22:43):    Rare polymorphonuclear leukocytes per low power field    Numerous Gram Negative Rods per oil power field    Numerous Gram positive cocci in pairs per oil power field    Few Gram Positive Rods per oil power field  Final Report (12-07-23 @ 11:46):    Few Klebsiella pneumoniae    Moderate Streptococcus mitis/oralis group "Susceptibilities not performed"    Few Prevotella bivia "Susceptibilities not performed"    Rare Bacteroides fragilis "Susceptibilities not performed"  Organism: Klebsiella pneumoniae (12-07-23 @ 11:46)  Organism: Klebsiella pneumoniae (12-07-23 @ 11:46)    Sensitivities:      -  Levofloxacin: S <=0.5      -  Tobramycin: S <=2      -  Aztreonam: S <=4      -  Gentamicin: S <=2      -  Cefepime: S <=2      -  Cefazolin: S <=2      -  Piperacillin/Tazobactam: S <=8      -  Ciprofloxacin: S <=0.25      -  Imipenem: S <=1      -  Ceftriaxone: S <=1      -  Ampicillin: R >16 These ampicillin results predict results for amoxicillin      Method Type: ROSALIO      -  Meropenem: S <=1      -  Ampicillin/Sulbactam: S 8/4      -  Cefoxitin: S <=8      -  Amoxicillin/Clavulanic Acid: S <=8/4      -  Trimethoprim/Sulfamethoxazole: S <=0.5/9.5      -  Ertapenem: S <=0.5    Culture - Blood (collected 12-04-23 @ 13:00)  Source: .Blood Blood-Peripheral  Preliminary Report (12-06-23 @ 19:01):    No growth at 48 Hours    Culture - Blood (collected 12-04-23 @ 12:41)  Source: .Blood Blood-Peripheral  Preliminary Report (12-06-23 @ 19:01):    No growth at 48 Hours    Culture - Other (collected 09-26-23 @ 10:18)  Source: .Other left plantar foot ulcer  Final Report (09-28-23 @ 16:53):    Numerous Staphylococcus aureus  Organism: Staphylococcus aureus (09-28-23 @ 16:53)  Organism: Staphylococcus aureus (09-28-23 @ 16:53)    Sensitivities:      -  Clindamycin: S <=0.25      -  Oxacillin: S 0.5 Oxacillin predicts susceptibility for dicloxacillin, methicillin, and nafcillin      -  Gentamicin: S <=1 Should not be used as monotherapy      -  Cefazolin: S <=4      -  Vancomycin: S 2      -  Tetracycline: S <=1      Method Type: ROSALIO      -  Ampicillin/Sulbactam: S <=8/4      -  Penicillin: R >8      -  Rifampin: S <=1 Should not be used as monotherapy      -  Erythromycin: S <=0.25      -  Trimethoprim/Sulfamethoxazole: S <=0.5/9.5    Culture - Other (collected 05-31-23 @ 14:50)  Source: .Other Wound- left plantar foot  Final Report (06-02-23 @ 18:26):    Culture yields growth of greater than 3 colony types of    bacteria,  which may indicate contamination and normal alvin    Call client services within 7 days if further workup is clinically    indicated. Culture includes    Few Methicillin Resistant Staphylococcus aureus  Organism: Methicillin resistant Staphylococcus aureus (06-02-23 @ 18:26)  Organism: Methicillin resistant Staphylococcus aureus (06-02-23 @ 18:26)    Sensitivities:      -  Levofloxacin: I 4      -  Clindamycin: S <=0.25      -  Oxacillin: R >2      -  Gentamicin: R >8 Should not be used as monotherapy      -  Daptomycin: S 0.5      -  Linezolid: S 2      -  Cefazolin: R <=4      -  Moxifloxacin: R 2      -  Vancomycin: S 2      -  Ciprofloxacin: R >2      -  Ceftriaxone: R <=4      -  Ampicillin: R >8      -  Tetracycline: S <=1      Method Type: ROSALIO      -  Meropenem: R <=2      -  Ampicillin/Sulbactam: R <=8/4      -  Penicillin: R >8      -  Rifampin: S <=1 Should not be used as monotherapy      -  Erythromycin: R >4      -  Amoxicillin/Clavulanic Acid: R <=4/2      -  Trimethoprim/Sulfamethoxazole: S <=0.5/9.5    WBC Count: 9.85 K/uL (12-07-23 @ 08:55)  WBC Count: 9.07 K/uL (12-06-23 @ 09:25)  WBC Count: 9.65 K/uL (12-05-23 @ 08:00)  WBC Count: 11.96 K/uL (12-04-23 @ 12:57)    Creatinine: 1.20 mg/dL (12-07-23 @ 08:55)  Creatinine: 1.20 mg/dL (12-06-23 @ 09:25)  Creatinine: 1.20 mg/dL (12-05-23 @ 08:00)  Creatinine: 1.20 mg/dL (12-04-23 @ 12:57)    C-Reactive Protein, Serum: 38 mg/L (12-04-23 @ 12:57)    Sedimentation Rate, Erythrocyte: 74 mm/hr (12-04-23 @ 12:57)      All imaging and data are reviewed.   < from: Xray Foot AP + Lateral + Oblique, Bilat (12.04.23 @ 13:45) >  IMPRESSION:  There is extensive irregular bone destruction involving the proximal and   middle phalanx of the fourth toe consistent with acute osteomyelitis.   Remaining osseous structures are intact without fracture or dislocation.   Calcaneal osteophytes. Vascular calcification. No soft tissue gas.    Assessment and Plan:   72 yo man with PMH of HTN, DM2, and Hypothyroidism was admitted with an ulcer in left 4th toe. He has chronic pain to the left foot that started 2-3 months ago and has progressively worsened. Pt states that he could see the bone, follows with wound care who recommended amputation.     # Left 4th toe ulcer     - Awaiting surgical pathology  - Bone and soft tissue culture with Klebsiella and strep   - The old culture with MRSA and latest one with MSSA,   - Can switch cefazolin to ceftriaxone 2gm daily covering MSSA, Klebsiella and strep   - S/P amputation of 4th toe and metatarsal head 12/5/23  - Pathology will mild focal osteomyelitis in proximal margin  - Will order PICC  - Will need 6 weeks treatment  - Last day wound be 1/15/2024  - CBC, CMP and CRP weekly     Will follow PRN.    Roverto Rincon MD  Division of Infectious Diseases   Please call ID service at [phone] with any question.      50 minutes spent on total encounter assessing patient, examination, chart review, counseling and coordinating care by the attending physician/nurse/care manager.   [facility]   INFECTIOUS DISEASES   [address] [place] [address]  Tel: [phone]     Fax: [phone]  =========================================================  MD Cedrikc Sanz Kaushal, MD Cho, Michelle, MD Sunjit, Jaspal, MD  =========================================================    VALENTIN CERON [id number]    Follow up: Left Foot wound     No new changes, no fever, no acute incidents overnight. No pain.     Allergies:  No Known Allergies      Antibiotics:  acetaminophen     Tablet .. 650 milliGRAM(s) Oral every 6 hours PRN  atorvastatin 10 milliGRAM(s) Oral at bedtime  ceFAZolin   IVPB 2000 milliGRAM(s) IV Intermittent every 8 hours  dextrose 50% Injectable 12.5 Gram(s) IV Push once  dextrose 50% Injectable 25 Gram(s) IV Push once  dextrose 50% Injectable 25 Gram(s) IV Push once  dextrose Oral Gel 15 Gram(s) Oral once PRN  glucagon  Injectable 1 milliGRAM(s) IntraMuscular once  heparin   Injectable 5000 Unit(s) SubCutaneous every 8 hours  hydrochlorothiazide 12.5 milliGRAM(s) Oral daily  influenza  Vaccine (HIGH DOSE) 0.7 milliLiter(s) IntraMuscular once  insulin lispro (ADMELOG) corrective regimen sliding scale   SubCutaneous three times a day before meals  levothyroxine 50 MICROGram(s) Oral daily  losartan 100 milliGRAM(s) Oral daily  melatonin 3 milliGRAM(s) Oral at bedtime PRN  metoprolol succinate ER 25 milliGRAM(s) Oral daily    REVIEW OF SYSTEMS:  CONSTITUTIONAL:  No Fever or chills  HEENT:  No diplopia or blurred vision.  No sore throat or runny nose.  CARDIOVASCULAR:  No chest pain or SOB.  RESPIRATORY:  No cough, shortness of breath, PND or orthopnea.  GASTROINTESTINAL:  No nausea, vomiting or diarrhea.  GENITOURINARY:  No dysuria, frequency or urgency. No Blood in urine  MUSCULOSKELETAL:  no joint aches, no muscle pain  SKIN:  foot ulcer   NEUROLOGIC:  No paresthesias or weakness.  PSYCHIATRIC:  No disorder of thought or mood.  ENDOCRINE:  No heat or cold intolerance, polyuria or polydipsia.  HEMATOLOGICAL:  No easy bruising or bleeding.      Physical Exam:  Vital Signs Last 24 Hrs  T(C): 36.8 (07 Dec 2023 12:06), Max: 36.8 (07 Dec 2023 12:06)  T(F): 98.2 (07 Dec 2023 12:06), Max: 98.2 (07 Dec 2023 12:06)  HR: 68 (07 Dec 2023 12:06) (57 - 68)  BP: 127/68 (07 Dec 2023 12:06) (127/68 - 198/80)  BP(mean): --  RR: 17 (07 Dec 2023 12:06) (17 - 18)  SpO2: 98% (07 Dec 2023 12:06) (95% - 98%)  GEN: NAD  HEENT: normocephalic and atraumatic. EOMI. PERRL.    NECK: Supple.  No lymphadenopathy   LUNGS: Clear to auscultation.  HEART: Regular rate and rhythm without murmur.  ABDOMEN: Soft, nontender, and nondistended.  Positive bowel sounds.    : No CVA tenderness  EXTREMITIES: Without edema.  NEUROLOGIC: grossly intact.  PSYCHIATRIC: Appropriate affect .  SKIN: lateral side of left 4th toe with an open ulcer and priwound erythema with some   serosanguinous discharge          Labs:                        13.5   9.85  )-----------( 204      ( 07 Dec 2023 08:55 )             39.7     12-07    134<L>  |  99  |  15  ----------------------------<  222<H>  4.8   |  30  |  1.20    Ca    9.7      07 Dec 2023 08:55    TPro  7.5  /  Alb  3.0<L>  /  TBili  0.7  /  DBili  x   /  AST  10<L>  /  ALT  12  /  AlkPhos  115  12-06      Culture - Tissue with Gram Stain (collected 12-05-23 @ 11:00)  Source: .Tissue Other, LEFT FOOT 4TH METATARSO HEAD  Gram Stain (12-05-23 @ 22:43):    Rare polymorphonuclear leukocytes per low power field    Few Gram positive cocci in pairs per oil power field  Final Report (12-07-23 @ 11:48):    Rare Klebsiella pneumoniae    Few Streptococcus mitis/oralis group "Susceptibilities not performed"    Few Prevotella bivia "Susceptibilities not performed"    Few Bacteroides fragilis "Susceptibilities not performed"  Organism: Klebsiella pneumoniae (12-07-23 @ 11:48)  Organism: Klebsiella pneumoniae (12-07-23 @ 11:48)    Sensitivities:      -  Levofloxacin: S <=0.5      -  Tobramycin: S <=2      -  Aztreonam: S <=4      -  Gentamicin: S <=2      -  Cefepime: S <=2      -  Cefazolin: S <=2      -  Piperacillin/Tazobactam: S <=8      -  Ciprofloxacin: S <=0.25      -  Imipenem: S <=1      -  Ceftriaxone: S <=1      -  Ampicillin: R >16 These ampicillin results predict results for amoxicillin      Method Type: ROSALIO      -  Meropenem: S <=1      -  Ampicillin/Sulbactam: S 8/4      -  Cefoxitin: S <=8      -  Amoxicillin/Clavulanic Acid: S <=8/4      -  Trimethoprim/Sulfamethoxazole: S <=0.5/9.5      -  Ertapenem: S <=0.5    Culture - Tissue with Gram Stain (collected 12-05-23 @ 11:00)  Source: .Tissue Other, LEFT FOOT 4TH TOE BONE CULTURE  Gram Stain (12-05-23 @ 22:43):    Rare polymorphonuclear leukocytes per low power field    Numerous Gram Negative Rods per oil power field    Numerous Gram positive cocci in pairs per oil power field    Few Gram Positive Rods per oil power field  Final Report (12-07-23 @ 11:46):    Few Klebsiella pneumoniae    Moderate Streptococcus mitis/oralis group "Susceptibilities not performed"    Few Prevotella bivia "Susceptibilities not performed"    Rare Bacteroides fragilis "Susceptibilities not performed"  Organism: Klebsiella pneumoniae (12-07-23 @ 11:46)  Organism: Klebsiella pneumoniae (12-07-23 @ 11:46)    Sensitivities:      -  Levofloxacin: S <=0.5      -  Tobramycin: S <=2      -  Aztreonam: S <=4      -  Gentamicin: S <=2      -  Cefepime: S <=2      -  Cefazolin: S <=2      -  Piperacillin/Tazobactam: S <=8      -  Ciprofloxacin: S <=0.25      -  Imipenem: S <=1      -  Ceftriaxone: S <=1      -  Ampicillin: R >16 These ampicillin results predict results for amoxicillin      Method Type: ROSALIO      -  Meropenem: S <=1      -  Ampicillin/Sulbactam: S 8/4      -  Cefoxitin: S <=8      -  Amoxicillin/Clavulanic Acid: S <=8/4      -  Trimethoprim/Sulfamethoxazole: S <=0.5/9.5      -  Ertapenem: S <=0.5    Culture - Blood (collected 12-04-23 @ 13:00)  Source: .Blood Blood-Peripheral  Preliminary Report (12-06-23 @ 19:01):    No growth at 48 Hours    Culture - Blood (collected 12-04-23 @ 12:41)  Source: .Blood Blood-Peripheral  Preliminary Report (12-06-23 @ 19:01):    No growth at 48 Hours    Culture - Other (collected 09-26-23 @ 10:18)  Source: .Other left plantar foot ulcer  Final Report (09-28-23 @ 16:53):    Numerous Staphylococcus aureus  Organism: Staphylococcus aureus (09-28-23 @ 16:53)  Organism: Staphylococcus aureus (09-28-23 @ 16:53)    Sensitivities:      -  Clindamycin: S <=0.25      -  Oxacillin: S 0.5 Oxacillin predicts susceptibility for dicloxacillin, methicillin, and nafcillin      -  Gentamicin: S <=1 Should not be used as monotherapy      -  Cefazolin: S <=4      -  Vancomycin: S 2      -  Tetracycline: S <=1      Method Type: ROSALIO      -  Ampicillin/Sulbactam: S <=8/4      -  Penicillin: R >8      -  Rifampin: S <=1 Should not be used as monotherapy      -  Erythromycin: S <=0.25      -  Trimethoprim/Sulfamethoxazole: S <=0.5/9.5    Culture - Other (collected 05-31-23 @ 14:50)  Source: .Other Wound- left plantar foot  Final Report (06-02-23 @ 18:26):    Culture yields growth of greater than 3 colony types of    bacteria,  which may indicate contamination and normal alvin    Call client services within 7 days if further workup is clinically    indicated. Culture includes    Few Methicillin Resistant Staphylococcus aureus  Organism: Methicillin resistant Staphylococcus aureus (06-02-23 @ 18:26)  Organism: Methicillin resistant Staphylococcus aureus (06-02-23 @ 18:26)    Sensitivities:      -  Levofloxacin: I 4      -  Clindamycin: S <=0.25      -  Oxacillin: R >2      -  Gentamicin: R >8 Should not be used as monotherapy      -  Daptomycin: S 0.5      -  Linezolid: S 2      -  Cefazolin: R <=4      -  Moxifloxacin: R 2      -  Vancomycin: S 2      -  Ciprofloxacin: R >2      -  Ceftriaxone: R <=4      -  Ampicillin: R >8      -  Tetracycline: S <=1      Method Type: ROSALIO      -  Meropenem: R <=2      -  Ampicillin/Sulbactam: R <=8/4      -  Penicillin: R >8      -  Rifampin: S <=1 Should not be used as monotherapy      -  Erythromycin: R >4      -  Amoxicillin/Clavulanic Acid: R <=4/2      -  Trimethoprim/Sulfamethoxazole: S <=0.5/9.5    WBC Count: 9.85 K/uL (12-07-23 @ 08:55)  WBC Count: 9.07 K/uL (12-06-23 @ 09:25)  WBC Count: 9.65 K/uL (12-05-23 @ 08:00)  WBC Count: 11.96 K/uL (12-04-23 @ 12:57)    Creatinine: 1.20 mg/dL (12-07-23 @ 08:55)  Creatinine: 1.20 mg/dL (12-06-23 @ 09:25)  Creatinine: 1.20 mg/dL (12-05-23 @ 08:00)  Creatinine: 1.20 mg/dL (12-04-23 @ 12:57)    C-Reactive Protein, Serum: 38 mg/L (12-04-23 @ 12:57)    Sedimentation Rate, Erythrocyte: 74 mm/hr (12-04-23 @ 12:57)      All imaging and data are reviewed.   < from: Xray Foot AP + Lateral + Oblique, Bilat (12.04.23 @ 13:45) >  IMPRESSION:  There is extensive irregular bone destruction involving the proximal and   middle phalanx of the fourth toe consistent with acute osteomyelitis.   Remaining osseous structures are intact without fracture or dislocation.   Calcaneal osteophytes. Vascular calcification. No soft tissue gas.    Assessment and Plan:   74 yo man with PMH of HTN, DM2, and Hypothyroidism was admitted with an ulcer in left 4th toe. He has chronic pain to the left foot that started 2-3 months ago and has progressively worsened. Pt states that he could see the bone, follows with wound care who recommended amputation.     # Left 4th toe ulcer     - Awaiting surgical pathology  - Bone and soft tissue culture with Klebsiella and strep   - The old culture with MRSA and latest one with MSSA,   - Can switch cefazolin to ceftriaxone 2gm daily covering MSSA, Klebsiella and strep   - S/P amputation of 4th toe and metatarsal head 12/5/23  - Pathology will mild focal osteomyelitis in proximal margin  - Will order PICC  - Will need 6 weeks treatment  - Last day wound be 1/15/2024  - CBC, CMP and CRP weekly     Will follow PRN.    Roverto Rincon MD  Division of Infectious Diseases   Please call ID service at [phone] with any question.      50 minutes spent on total encounter assessing patient, examination, chart review, counseling and coordinating care by the attending physician/nurse/care manager.

## 2023-12-07 NOTE — PROGRESS NOTE ADULT - PROBLEM SELECTOR PLAN 2
EKG: Sinus Bradycardia, 54 bpm  -sinus arrythmia appreciated on auscultation  -no prior EKGs on record for comparison  -Pt states that his HR usually runs low  -cardio consulted

## 2023-12-07 NOTE — PROGRESS NOTE ADULT - PROBLEM SELECTOR PLAN 4
Chronic, History of DM2  - Hold home medications  - Low dose insulin corrective scale  - Hypoglycemia protocol, Accchris AC&HS  - F/u HbA1c- 9.5
Chronic, History of DM2  - Hold home medications  - Moderate dose insulin corrective scale  - Hypoglycemia protocol, Accchris AC&HS  - F/u HbA1c- 9.5
Chronic, History of DM2  - Hold home medications  - Low dose insulin corrective scale  - Hypoglycemia protocol, Accuchecks AC&HS  - Diet regular food with DASH  - F/u HbA1c

## 2023-12-07 NOTE — CASE MANAGEMENT PROGRESS NOTE - NSCMPROGRESSNOTE_GEN_ALL_CORE
Spoke with patient and spouse at the bedside regarding home IVABX. Patient and spouse agreeable to home IVABX. Referral sent to [facility] per patients choice. Dr. Rincon to call in RX to [facility]. Patient awaiting PICC line placement. Anticipated discharge 12/8/23. Spoke with patient and spouse at the bedside regarding home IVABX. Patient and spouse agreeable to home IVABX. Referral sent to [facility] per patients choice. Dr. Rincon to call in RX to [facility]. Patient awaiting PICC line placement. Anticipated discharge 12/8/23.

## 2023-12-07 NOTE — PROGRESS NOTE ADULT - PROBLEM SELECTOR PLAN 3
-likely reactive to pain from foot wound infection   -home medications of metoprolol, hydrochlorothiazide, and losartan with hold parameters
-likely reactive to pain from foot wound infection   -home medications of metoprolol, hydrochlorothiazide, and losartan with hold parameters
-likely reactive to pain from foot wound infection   -home medications of metoprolol, hydrochlorothiazide, and losartan with hold parameters  -BP elevated in AM, will increased HCTZ from 12.5mg daily to 25mg daily. Adding another medication may lead to poor compliance from patient- discussed with Cardio, agrees with plan

## 2023-12-08 ENCOUNTER — TRANSCRIPTION ENCOUNTER (OUTPATIENT)
Age: 73
End: 2023-12-08

## 2023-12-08 VITALS — WEIGHT: 175.05 LBS

## 2023-12-08 PROBLEM — E03.9 HYPOTHYROIDISM, UNSPECIFIED: Chronic | Status: ACTIVE | Noted: 2023-12-04

## 2023-12-08 PROBLEM — E78.5 HYPERLIPIDEMIA, UNSPECIFIED: Chronic | Status: ACTIVE | Noted: 2023-12-04

## 2023-12-08 PROBLEM — I10 ESSENTIAL (PRIMARY) HYPERTENSION: Chronic | Status: ACTIVE | Noted: 2023-12-04

## 2023-12-08 PROBLEM — E11.9 TYPE 2 DIABETES MELLITUS WITHOUT COMPLICATIONS: Chronic | Status: ACTIVE | Noted: 2023-12-04

## 2023-12-08 LAB
ANION GAP SERPL CALC-SCNC: 6 MMOL/L — SIGNIFICANT CHANGE UP (ref 5–17)
ANION GAP SERPL CALC-SCNC: 6 MMOL/L — SIGNIFICANT CHANGE UP (ref 5–17)
BUN SERPL-MCNC: 18 MG/DL — SIGNIFICANT CHANGE UP (ref 7–23)
BUN SERPL-MCNC: 18 MG/DL — SIGNIFICANT CHANGE UP (ref 7–23)
CALCIUM SERPL-MCNC: 9.4 MG/DL — SIGNIFICANT CHANGE UP (ref 8.5–10.1)
CALCIUM SERPL-MCNC: 9.4 MG/DL — SIGNIFICANT CHANGE UP (ref 8.5–10.1)
CHLORIDE SERPL-SCNC: 98 MMOL/L — SIGNIFICANT CHANGE UP (ref 96–108)
CHLORIDE SERPL-SCNC: 98 MMOL/L — SIGNIFICANT CHANGE UP (ref 96–108)
CO2 SERPL-SCNC: 29 MMOL/L — SIGNIFICANT CHANGE UP (ref 22–31)
CO2 SERPL-SCNC: 29 MMOL/L — SIGNIFICANT CHANGE UP (ref 22–31)
CREAT SERPL-MCNC: 1.2 MG/DL — SIGNIFICANT CHANGE UP (ref 0.5–1.3)
CREAT SERPL-MCNC: 1.2 MG/DL — SIGNIFICANT CHANGE UP (ref 0.5–1.3)
EGFR: 63 ML/MIN/1.73M2 — SIGNIFICANT CHANGE UP
EGFR: 63 ML/MIN/1.73M2 — SIGNIFICANT CHANGE UP
GLUCOSE SERPL-MCNC: 215 MG/DL — HIGH (ref 70–99)
GLUCOSE SERPL-MCNC: 215 MG/DL — HIGH (ref 70–99)
HCT VFR BLD CALC: 42.3 % — SIGNIFICANT CHANGE UP (ref 39–50)
HCT VFR BLD CALC: 42.3 % — SIGNIFICANT CHANGE UP (ref 39–50)
HGB BLD-MCNC: 14.3 G/DL — SIGNIFICANT CHANGE UP (ref 13–17)
HGB BLD-MCNC: 14.3 G/DL — SIGNIFICANT CHANGE UP (ref 13–17)
MCHC RBC-ENTMCNC: 27.3 PG — SIGNIFICANT CHANGE UP (ref 27–34)
MCHC RBC-ENTMCNC: 27.3 PG — SIGNIFICANT CHANGE UP (ref 27–34)
MCHC RBC-ENTMCNC: 33.8 GM/DL — SIGNIFICANT CHANGE UP (ref 32–36)
MCHC RBC-ENTMCNC: 33.8 GM/DL — SIGNIFICANT CHANGE UP (ref 32–36)
MCV RBC AUTO: 80.9 FL — SIGNIFICANT CHANGE UP (ref 80–100)
MCV RBC AUTO: 80.9 FL — SIGNIFICANT CHANGE UP (ref 80–100)
NRBC # BLD: 0 /100 WBCS — SIGNIFICANT CHANGE UP (ref 0–0)
NRBC # BLD: 0 /100 WBCS — SIGNIFICANT CHANGE UP (ref 0–0)
PLATELET # BLD AUTO: 365 K/UL — SIGNIFICANT CHANGE UP (ref 150–400)
PLATELET # BLD AUTO: 365 K/UL — SIGNIFICANT CHANGE UP (ref 150–400)
POTASSIUM SERPL-MCNC: 4.1 MMOL/L — SIGNIFICANT CHANGE UP (ref 3.5–5.3)
POTASSIUM SERPL-MCNC: 4.1 MMOL/L — SIGNIFICANT CHANGE UP (ref 3.5–5.3)
POTASSIUM SERPL-SCNC: 4.1 MMOL/L — SIGNIFICANT CHANGE UP (ref 3.5–5.3)
POTASSIUM SERPL-SCNC: 4.1 MMOL/L — SIGNIFICANT CHANGE UP (ref 3.5–5.3)
RBC # BLD: 5.23 M/UL — SIGNIFICANT CHANGE UP (ref 4.2–5.8)
RBC # BLD: 5.23 M/UL — SIGNIFICANT CHANGE UP (ref 4.2–5.8)
RBC # FLD: 12.6 % — SIGNIFICANT CHANGE UP (ref 10.3–14.5)
RBC # FLD: 12.6 % — SIGNIFICANT CHANGE UP (ref 10.3–14.5)
SODIUM SERPL-SCNC: 133 MMOL/L — LOW (ref 135–145)
SODIUM SERPL-SCNC: 133 MMOL/L — LOW (ref 135–145)
WBC # BLD: 11.43 K/UL — HIGH (ref 3.8–10.5)
WBC # BLD: 11.43 K/UL — HIGH (ref 3.8–10.5)
WBC # FLD AUTO: 11.43 K/UL — HIGH (ref 3.8–10.5)
WBC # FLD AUTO: 11.43 K/UL — HIGH (ref 3.8–10.5)

## 2023-12-08 PROCEDURE — 73630 X-RAY EXAM OF FOOT: CPT

## 2023-12-08 PROCEDURE — G0463: CPT | Mod: 25

## 2023-12-08 PROCEDURE — 85610 PROTHROMBIN TIME: CPT

## 2023-12-08 PROCEDURE — 85730 THROMBOPLASTIN TIME PARTIAL: CPT

## 2023-12-08 PROCEDURE — 36573 INSJ PICC RS&I 5 YR+: CPT

## 2023-12-08 PROCEDURE — 84443 ASSAY THYROID STIM HORMONE: CPT

## 2023-12-08 PROCEDURE — 11044 DBRDMT BONE 1ST 20 SQ CM/<: CPT

## 2023-12-08 PROCEDURE — 93005 ELECTROCARDIOGRAM TRACING: CPT

## 2023-12-08 PROCEDURE — 84100 ASSAY OF PHOSPHORUS: CPT

## 2023-12-08 PROCEDURE — 88304 TISSUE EXAM BY PATHOLOGIST: CPT

## 2023-12-08 PROCEDURE — 80053 COMPREHEN METABOLIC PANEL: CPT

## 2023-12-08 PROCEDURE — 87077 CULTURE AEROBIC IDENTIFY: CPT

## 2023-12-08 PROCEDURE — 83036 HEMOGLOBIN GLYCOSYLATED A1C: CPT

## 2023-12-08 PROCEDURE — 99285 EMERGENCY DEPT VISIT HI MDM: CPT | Mod: 25

## 2023-12-08 PROCEDURE — 88311 DECALCIFY TISSUE: CPT

## 2023-12-08 PROCEDURE — 71046 X-RAY EXAM CHEST 2 VIEWS: CPT

## 2023-12-08 PROCEDURE — 83735 ASSAY OF MAGNESIUM: CPT

## 2023-12-08 PROCEDURE — 85027 COMPLETE CBC AUTOMATED: CPT

## 2023-12-08 PROCEDURE — 82962 GLUCOSE BLOOD TEST: CPT

## 2023-12-08 PROCEDURE — C1751: CPT

## 2023-12-08 PROCEDURE — 87640 STAPH A DNA AMP PROBE: CPT

## 2023-12-08 PROCEDURE — 84134 ASSAY OF PREALBUMIN: CPT

## 2023-12-08 PROCEDURE — 86140 C-REACTIVE PROTEIN: CPT

## 2023-12-08 PROCEDURE — 99239 HOSP IP/OBS DSCHRG MGMT >30: CPT

## 2023-12-08 PROCEDURE — 87040 BLOOD CULTURE FOR BACTERIA: CPT

## 2023-12-08 PROCEDURE — 80048 BASIC METABOLIC PNL TOTAL CA: CPT

## 2023-12-08 PROCEDURE — 87186 SC STD MICRODIL/AGAR DIL: CPT

## 2023-12-08 PROCEDURE — 97162 PT EVAL MOD COMPLEX 30 MIN: CPT

## 2023-12-08 PROCEDURE — 76937 US GUIDE VASCULAR ACCESS: CPT

## 2023-12-08 PROCEDURE — 80061 LIPID PANEL: CPT

## 2023-12-08 PROCEDURE — 87070 CULTURE OTHR SPECIMN AEROBIC: CPT

## 2023-12-08 PROCEDURE — 99232 SBSQ HOSP IP/OBS MODERATE 35: CPT

## 2023-12-08 PROCEDURE — 85652 RBC SED RATE AUTOMATED: CPT

## 2023-12-08 PROCEDURE — 86803 HEPATITIS C AB TEST: CPT

## 2023-12-08 PROCEDURE — 87641 MR-STAPH DNA AMP PROBE: CPT

## 2023-12-08 PROCEDURE — 36415 COLL VENOUS BLD VENIPUNCTURE: CPT

## 2023-12-08 PROCEDURE — 87075 CULTR BACTERIA EXCEPT BLOOD: CPT

## 2023-12-08 PROCEDURE — 85025 COMPLETE CBC W/AUTO DIFF WBC: CPT

## 2023-12-08 PROCEDURE — 96374 THER/PROPH/DIAG INJ IV PUSH: CPT

## 2023-12-08 RX ORDER — CHLORHEXIDINE GLUCONATE 213 G/1000ML
1 SOLUTION TOPICAL
Refills: 0 | Status: DISCONTINUED | OUTPATIENT
Start: 2023-12-08 | End: 2023-12-08

## 2023-12-08 RX ORDER — SODIUM CHLORIDE 9 MG/ML
10 INJECTION INTRAMUSCULAR; INTRAVENOUS; SUBCUTANEOUS
Refills: 0 | Status: DISCONTINUED | OUTPATIENT
Start: 2023-12-08 | End: 2023-12-08

## 2023-12-08 RX ORDER — CEFTRIAXONE 500 MG/1
1 INJECTION, POWDER, FOR SOLUTION INTRAMUSCULAR; INTRAVENOUS
Qty: 28 | Refills: 0
Start: 2023-12-08 | End: 2024-01-04

## 2023-12-08 RX ORDER — CEFTRIAXONE 500 MG/1
1 INJECTION, POWDER, FOR SOLUTION INTRAMUSCULAR; INTRAVENOUS
Qty: 42 | Refills: 0
Start: 2023-12-08 | End: 2024-01-18

## 2023-12-08 RX ORDER — INSULIN GLARGINE 100 [IU]/ML
10 INJECTION, SOLUTION SUBCUTANEOUS EVERY MORNING
Refills: 0 | Status: DISCONTINUED | OUTPATIENT
Start: 2023-12-08 | End: 2023-12-08

## 2023-12-08 RX ADMIN — Medication 2: at 08:14

## 2023-12-08 RX ADMIN — LOSARTAN POTASSIUM 100 MILLIGRAM(S): 100 TABLET, FILM COATED ORAL at 05:47

## 2023-12-08 RX ADMIN — INSULIN GLARGINE 10 UNIT(S): 100 INJECTION, SOLUTION SUBCUTANEOUS at 08:22

## 2023-12-08 RX ADMIN — CEFTRIAXONE 100 MILLIGRAM(S): 500 INJECTION, POWDER, FOR SOLUTION INTRAMUSCULAR; INTRAVENOUS at 12:12

## 2023-12-08 RX ADMIN — HEPARIN SODIUM 5000 UNIT(S): 5000 INJECTION INTRAVENOUS; SUBCUTANEOUS at 05:46

## 2023-12-08 RX ADMIN — Medication 50 MICROGRAM(S): at 05:46

## 2023-12-08 RX ADMIN — Medication 6: at 12:06

## 2023-12-08 RX ADMIN — Medication 25 MILLIGRAM(S): at 05:46

## 2023-12-08 RX ADMIN — MUPIROCIN 1 APPLICATION(S): 20 OINTMENT TOPICAL at 05:45

## 2023-12-08 NOTE — DISCHARGE NOTE PROVIDER - NSDCQMCOGNITION_NEU_ALL_CORE
No difficulties Stelara Counseling:  I discussed with the patient the risks of ustekinumab including but not limited to immunosuppression, malignancy, posterior leukoencephalopathy syndrome, and serious infections.  The patient understands that monitoring is required including a PPD at baseline and must alert us or the primary physician if symptoms of infection or other concerning signs are noted.

## 2023-12-08 NOTE — PROGRESS NOTE ADULT - PROBLEM SELECTOR PROBLEM 1
Diabetes mellitus
Osteomyelitis of left foot
Wound of foot
Wound of foot
Osteomyelitis of left foot
Wound of foot

## 2023-12-08 NOTE — DIETITIAN INITIAL EVALUATION ADULT - PROBLEM SELECTOR PLAN 2
EKG: Sinus Bradycardia, 54 bpm  -sinus arrythmia appreciated on auscultation  -no prior EKGs on record for comparison  -Pt states that his HR usually runs low  -f/u AM mg/phos  -cardio consulted

## 2023-12-08 NOTE — PROGRESS NOTE ADULT - ASSESSMENT
73 yoM with PMH of HTN, DM type 2 (on insulin), Hypothyroidism, and hyperlipidemia presents to the ED by wound care for evaluation of left 4th toe wound    pre/post optimization, HTN   - s/p amputation of 4th toe and metatarsal bone of left foot (12/5), ortho following, tolerated well from CV POV   - s/p PICC placement for IV abx, per ID following     - BP remains labile 120- up to 180's systolics mostly in the AM  HR 50-70's   - Continue metoprolol succinate 25 mg daily   - continue HCTZ increased to 25 mg PO daily   - continue Losartan 100 mg PO daily   - would start hydralazine 25 mg PO TID, uptitrate PRN for better BP control   - continue to monitor routine hemodynamics  - pain control per primary     - EKG sinus bradycardia, rate 55 no prior EKG for comparison   - No clear evidence of acute ischemia  - continue home atorvastatin 10 mg  - no anginal complaints   - Monitor and replete Lytes. Keep K > 4 and Mg > 2    - No meaningful evidence of volume overload. trace LE edema  - can obtain TTE while admitted, No prior TTE available    - DC planning per primary   - Will continue to follow.    Domi Biggs [facility]  Nurse Practitioner - Cardiology   call TEAMS     73 yoM with PMH of HTN, DM type 2 (on insulin), Hypothyroidism, and hyperlipidemia presents to the ED by wound care for evaluation of left 4th toe wound    pre/post optimization, HTN   - s/p amputation of 4th toe and metatarsal bone of left foot (12/5), ortho following, tolerated well from CV POV   - s/p PICC placement for IV abx, per ID following     - BP remains labile 120- up to 180's systolics mostly in the AM  HR 50-70's   - Continue metoprolol succinate 25 mg daily   - continue HCTZ increased to 25 mg PO daily   - continue Losartan 100 mg PO daily   - would start hydralazine 25 mg PO TID, uptitrate PRN for better BP control   - continue to monitor routine hemodynamics  - pain control per primary     - EKG sinus bradycardia, rate 55 no prior EKG for comparison   - No clear evidence of acute ischemia  - continue home atorvastatin 10 mg  - no anginal complaints   - Monitor and replete Lytes. Keep K > 4 and Mg > 2    - No meaningful evidence of volume overload. trace LE edema  - can obtain TTE while admitted, No prior TTE available    - DC planning per primary   - Will continue to follow.    Domi Biggs [facility]  Nurse Practitioner - Cardiology   call TEAMS

## 2023-12-08 NOTE — DISCHARGE NOTE NURSING/CASE MANAGEMENT/SOCIAL WORK - NSDCFUADDAPPT_GEN_ALL_CORE_FT
you have stated you will make your own follow up appointments. You have been made aware of copay $10 per delivery of medication and are agreeable

## 2023-12-08 NOTE — PROGRESS NOTE ADULT - SUBJECTIVE AND OBJECTIVE BOX
CAPILLARY BLOOD GLUCOSE      POCT Blood Glucose.: 259 mg/dL (07 Dec 2023 21:22)  POCT Blood Glucose.: 183 mg/dL (07 Dec 2023 16:45)  POCT Blood Glucose.: 304 mg/dL (07 Dec 2023 11:50)  POCT Blood Glucose.: 251 mg/dL (07 Dec 2023 07:45)      Vital Signs Last 24 Hrs  T(C): 36.6 (08 Dec 2023 05:45), Max: 36.8 (07 Dec 2023 12:06)  T(F): 97.9 (08 Dec 2023 05:45), Max: 98.3 (07 Dec 2023 19:47)  HR: 62 (08 Dec 2023 05:45) (62 - 73)  BP: 187/83 (08 Dec 2023 05:45) (127/68 - 187/83)  BP(mean): --  RR: 18 (08 Dec 2023 05:45) (17 - 18)  SpO2: 97% (08 Dec 2023 05:45) (97% - 98%)    Parameters below as of 08 Dec 2023 05:45  Patient On (Oxygen Delivery Method): room air        Respiratory: CTA B/L  CV: RRR, S1S2, no murmurs, rubs or gallops  Abdominal: Soft, NT, ND +BS, Last BM  Extremities: No edema, + peripheral pulses     12-07    134<L>  |  99  |  15  ----------------------------<  222<H>  4.8   |  30  |  1.20    Ca    9.7      07 Dec 2023 08:55    TPro  7.5  /  Alb  3.0<L>  /  TBili  0.7  /  DBili  x   /  AST  10<L>  /  ALT  12  /  AlkPhos  115  12-06      atorvastatin 10 milliGRAM(s) Oral at bedtime  dextrose 50% Injectable 25 Gram(s) IV Push once  dextrose 50% Injectable 25 Gram(s) IV Push once  dextrose 50% Injectable 12.5 Gram(s) IV Push once  dextrose Oral Gel 15 Gram(s) Oral once PRN  glucagon  Injectable 1 milliGRAM(s) IntraMuscular once  insulin lispro (ADMELOG) corrective regimen sliding scale   SubCutaneous three times a day before meals  insulin lispro (ADMELOG) corrective regimen sliding scale   SubCutaneous at bedtime  levothyroxine 50 MICROGram(s) Oral daily

## 2023-12-08 NOTE — DIETITIAN INITIAL EVALUATION ADULT - OTHER INFO
Pt is a "74 yo man with PMH of HTN, DM2, and Hypothyroidism was admitted with an ulcer in left 4th toe. He has chronic pain to the left foot that started 2-3 months ago and has progressively worsened. Pt states that he could see the bone, follows with wound care who recommended amputation. S/P amputation of 4th toe and metatarsal head 12/5/23."    Visited pt at bedside this am. Pt reports good appetite/intake. Tolerating diet well. No food allergies. Denies chewing/swallowing difficulties. Denies N/V. Reports constipation. Recommend bowel regimen. CBW on admission 175#. Pt reports stable weight. 1+ generalized edema noted. Pt currently on consistent carbohydrate, DASH/TLC diet. Pt with hx of DM, A1c of 9.5%. Pt does not check BS at home. Receiving lantus 10 units qam, mod dose admelog corrective scale coverage qac/qhs. Provided verbal and written DM and Heart Healthy diet education during visit. RD remains available.

## 2023-12-08 NOTE — DIETITIAN INITIAL EVALUATION ADULT - ADD RECOMMEND
1) Continue current diet ordered  2) Recommend MVI daily  3) Monitor po intake, diet tolerance, weight trends, labs, GI function, skin integrity

## 2023-12-08 NOTE — DISCHARGE NOTE NURSING/CASE MANAGEMENT/SOCIAL WORK - NSDCCRNAME_GEN_ALL_CORE_FT
[facility] [phone]/ fax [phone]  [facility] [phone] fax [phone]

## 2023-12-08 NOTE — DISCHARGE NOTE PROVIDER - NSDCQMSTAIRS_GEN_ALL_CORE
Pain not relieved by Medications/Fever greater than 101/Bleeding that does not stop/Inability to Tolerate Liquids or Foods/Persistent Nausea and Vomiting/Increased Irritability or Sluggishness No

## 2023-12-08 NOTE — DISCHARGE NOTE PROVIDER - NSDCMRMEDTOKEN_GEN_ALL_CORE_FT
atorvastatin 10 mg oral tablet: 1 tab(s) orally once a day (at bedtime)  hydroCHLOROthiazide 12.5 mg oral tablet: 1 tab(s) orally once a day  levothyroxine 50 mcg (0.05 mg) oral tablet: 1 tab(s) orally once a day  losartan 100 mg oral tablet: 1 tab(s) orally once a day  metoprolol succinate 25 mg oral tablet, extended release: 1 tab(s) orally once a day  pioglitazone 30 mg oral tablet: 1 tab(s) orally once a day   atorvastatin 10 mg oral tablet: 1 tab(s) orally once a day (at bedtime)  cefTRIAXone 1 g injection: 1 gram(s) intravenously once a day last dose 1/15/2024  hydroCHLOROthiazide 12.5 mg oral tablet: 1 tab(s) orally once a day  levothyroxine 50 mcg (0.05 mg) oral tablet: 1 tab(s) orally once a day  losartan 100 mg oral tablet: 1 tab(s) orally once a day  metoprolol succinate 25 mg oral tablet, extended release: 1 tab(s) orally once a day  pioglitazone 30 mg oral tablet: 1 tab(s) orally once a day

## 2023-12-08 NOTE — DISCHARGE NOTE PROVIDER - HOSPITAL COURSE
73 yoM with PMH of HTN, DM, Hypothyroidism, and hyperlipidemia presents to the ED by wound care for evaluation of infected left 4th toe.          Problem/Plan - 1:  ·  Problem: Wound of foot.   ·  Plan: -sent in by wound care for evaluation for worsening left 4th toe wound  -WBC 11.96, afebrile, does not meet sepsis criteria at this time  -MRI: Plantar soft tissue wound present at the level of the first metatarsal phalangeal joint without underlying osteomyelitis   -s/p cefazolin 1000mg x1, Acetominophen 650mg  -podiatry consulted, f/u recommendations  -continue IV ceftriaxone per ID recs  -POD#2 s/p amputation of L fourth toe and metatarsal head, f/u path results- mild chronic OM on proximal wound, will need extended course of IV abx per ID, PICC line order placed.     Problem/Plan - 2:  ·  Problem: Sinus bradycardia.   ·  Plan: EKG: Sinus Bradycardia, 54 bpm  -sinus arrythmia appreciated on auscultation  -no prior EKGs on record for comparison  -Pt states that his HR usually runs low  -cardio consulted.     Problem/Plan - 3:  ·  Problem: Hypertension.   ·  Plan: -likely reactive to pain from foot wound infection   -home medications of metoprolol, hydrochlorothiazide, and losartan with hold parameters  -BP elevated in AM, will increased HCTZ from 12.5mg daily to 25mg daily. Adding another medication may lead to poor compliance from patient- discussed with Cardio, agrees with plan.     Problem/Plan - 4:  ·  Problem: Diabetes mellitus.   ·  Plan: Chronic, History of DM2  - Hold home medications  - Moderate dose insulin corrective scale  - Hypoglycemia protocol, Accuchecks AC&HS  - F/u HbA1c- 9.5.     Problem/Plan - 5:  ·  Problem: Hypothyroidism.   ·  Plan: -home medication of synthroid 50 mcg per day  -continue.     Problem/Plan - 6:  ·  Problem: Hyperlipidemia.   ·  Plan: -chronic condition   -continue atorvastatin.     Problem/Plan - 7:  ·  Problem: Need for prophylactic measure.   ·  Plan: -heparin subq q8.

## 2023-12-08 NOTE — PROGRESS NOTE ADULT - SUBJECTIVE AND OBJECTIVE BOX
[facility] -- Roland Khoury Pannella, Patel, Savella, Goodger, Cohen  Office # [phone]    Follow Up:   HTN, post op    Subjective/Observations: seen and examined, awake, alert in bed, denies chest pain, dyspnea, palpitations or dizziness, orthopnea and PND. Tolerating room air. no events overnight     REVIEW OF SYSTEMS: All other review of systems is negative unless indicated above  PAST MEDICAL & SURGICAL HISTORY:  Hypertension      Diabetes mellitus      Hypothyroidism      Hyperlipidemia      No significant past surgical history        MEDICATIONS  (STANDING):  atorvastatin 10 milliGRAM(s) Oral at bedtime  cefTRIAXone   IVPB 2000 milliGRAM(s) IV Intermittent every 24 hours  chlorhexidine 4% Liquid 1 Application(s) Topical <User Schedule>  dextrose 50% Injectable 25 Gram(s) IV Push once  dextrose 50% Injectable 12.5 Gram(s) IV Push once  dextrose 50% Injectable 25 Gram(s) IV Push once  glucagon  Injectable 1 milliGRAM(s) IntraMuscular once  heparin   Injectable 5000 Unit(s) SubCutaneous every 8 hours  hydrochlorothiazide 25 milliGRAM(s) Oral daily  influenza  Vaccine (HIGH DOSE) 0.7 milliLiter(s) IntraMuscular once  insulin glargine Injectable (LANTUS) 10 Unit(s) SubCutaneous every morning  insulin lispro (ADMELOG) corrective regimen sliding scale   SubCutaneous three times a day before meals  insulin lispro (ADMELOG) corrective regimen sliding scale   SubCutaneous at bedtime  levothyroxine 50 MICROGram(s) Oral daily  losartan 100 milliGRAM(s) Oral daily  metoprolol succinate ER 25 milliGRAM(s) Oral daily  mupirocin 2% Nasal 1 Application(s) Both Nostrils two times a day    MEDICATIONS  (PRN):  acetaminophen     Tablet .. 650 milliGRAM(s) Oral every 6 hours PRN Temp greater or equal to 38C (100.4F), Mild Pain (1 - 3)  dextrose Oral Gel 15 Gram(s) Oral once PRN Blood Glucose LESS THAN 70 milliGRAM(s)/deciliter  melatonin 3 milliGRAM(s) Oral at bedtime PRN Insomnia  sodium chloride 0.9% lock flush 10 milliLiter(s) IV Push every 1 hour PRN Pre/post blood products, medications, blood draw, and to maintain line patency    Allergies    No Known Allergies    Intolerances      Vital Signs Last 24 Hrs  T(C): 36.6 (08 Dec 2023 05:45), Max: 36.8 (07 Dec 2023 12:06)  T(F): 97.9 (08 Dec 2023 05:45), Max: 98.3 (07 Dec 2023 19:47)  HR: 62 (08 Dec 2023 05:45) (62 - 73)  BP: 187/83 (08 Dec 2023 05:45) (127/68 - 187/83)  BP(mean): --  RR: 18 (08 Dec 2023 05:45) (17 - 18)  SpO2: 97% (08 Dec 2023 05:45) (97% - 98%)    Parameters below as of 08 Dec 2023 05:45  Patient On (Oxygen Delivery Method): room air      I&O's Summary      TELE: Not on telemetry   PHYSICAL EXAM:  Constitutional: NAD, awake and alert  HEENT: Moist Mucous Membranes, Anicteric  Pulmonary: Non-labored, breath sounds are clear bilaterally, No wheezing, rales or rhonchi  Cardiovascular: Regular, S1 and S2, No murmurs, rubs, gallops or clicks  Gastrointestinal: Bowel Sounds present, soft, nontender.   Lymph: trace peripheral edema. No lymphadenopathy.  Skin: No visible rashes or ulcers.  Psych:  Mood & affect appropriate  LABS: All Labs Reviewed:                                 14.3   11.43 )-----------( 365      ( 08 Dec 2023 07:34 )             42.3                         13.5   9.85  )-----------( 204      ( 07 Dec 2023 08:55 )             39.7                         12.9   9.07  )-----------( 290      ( 06 Dec 2023 09:25 )             37.7     08 Dec 2023 07:34    133    |  98     |  18     ----------------------------<  215    4.1     |  29     |  1.20   07 Dec 2023 08:55    134    |  99     |  15     ----------------------------<  222    4.8     |  30     |  1.20   06 Dec 2023 09:25    133    |  94     |  10     ----------------------------<  252    3.8     |  30     |  1.20     Ca    9.4        08 Dec 2023 07:34  Ca    9.7        07 Dec 2023 08:55  Ca    9.2        06 Dec 2023 09:25    TPro  7.5    /  Alb  3.0    /  TBili  0.7    /  DBili  x      /  AST  10     /  ALT  12     /  AlkPhos  115    06 Dec 2023 09:25          12 Lead ECG:   Ventricular Rate 55 BPM    Atrial Rate 55 BPM    P-R Interval 150 ms    QRS Duration 90 ms    Q-T Interval 434 ms    QTC Calculation(Bazett) 415 ms    P Axis 59 degrees    R Axis -17 degrees    T Axis 20 degrees    Diagnosis Line Sinus bradycardia  Otherwise normal ECG  No previous ECGs available  Confirmed by deena Goff ([id number]) on 12/5/2023 1:27:39 PM (12-04-23 @ 14:12)        [facility] -- Roland Khoury Pannella, Patel, Savella, Goodger, Cohen  Office # [phone]    Follow Up:   HTN, post op    Subjective/Observations: seen and examined, awake, alert in bed, denies chest pain, dyspnea, palpitations or dizziness, orthopnea and PND. Tolerating room air. no events overnight     REVIEW OF SYSTEMS: All other review of systems is negative unless indicated above  PAST MEDICAL & SURGICAL HISTORY:  Hypertension      Diabetes mellitus      Hypothyroidism      Hyperlipidemia      No significant past surgical history        MEDICATIONS  (STANDING):  atorvastatin 10 milliGRAM(s) Oral at bedtime  cefTRIAXone   IVPB 2000 milliGRAM(s) IV Intermittent every 24 hours  chlorhexidine 4% Liquid 1 Application(s) Topical <User Schedule>  dextrose 50% Injectable 25 Gram(s) IV Push once  dextrose 50% Injectable 12.5 Gram(s) IV Push once  dextrose 50% Injectable 25 Gram(s) IV Push once  glucagon  Injectable 1 milliGRAM(s) IntraMuscular once  heparin   Injectable 5000 Unit(s) SubCutaneous every 8 hours  hydrochlorothiazide 25 milliGRAM(s) Oral daily  influenza  Vaccine (HIGH DOSE) 0.7 milliLiter(s) IntraMuscular once  insulin glargine Injectable (LANTUS) 10 Unit(s) SubCutaneous every morning  insulin lispro (ADMELOG) corrective regimen sliding scale   SubCutaneous three times a day before meals  insulin lispro (ADMELOG) corrective regimen sliding scale   SubCutaneous at bedtime  levothyroxine 50 MICROGram(s) Oral daily  losartan 100 milliGRAM(s) Oral daily  metoprolol succinate ER 25 milliGRAM(s) Oral daily  mupirocin 2% Nasal 1 Application(s) Both Nostrils two times a day    MEDICATIONS  (PRN):  acetaminophen     Tablet .. 650 milliGRAM(s) Oral every 6 hours PRN Temp greater or equal to 38C (100.4F), Mild Pain (1 - 3)  dextrose Oral Gel 15 Gram(s) Oral once PRN Blood Glucose LESS THAN 70 milliGRAM(s)/deciliter  melatonin 3 milliGRAM(s) Oral at bedtime PRN Insomnia  sodium chloride 0.9% lock flush 10 milliLiter(s) IV Push every 1 hour PRN Pre/post blood products, medications, blood draw, and to maintain line patency    Allergies    No Known Allergies    Intolerances      Vital Signs Last 24 Hrs  T(C): 36.6 (08 Dec 2023 05:45), Max: 36.8 (07 Dec 2023 12:06)  T(F): 97.9 (08 Dec 2023 05:45), Max: 98.3 (07 Dec 2023 19:47)  HR: 62 (08 Dec 2023 05:45) (62 - 73)  BP: 187/83 (08 Dec 2023 05:45) (127/68 - 187/83)  BP(mean): --  RR: 18 (08 Dec 2023 05:45) (17 - 18)  SpO2: 97% (08 Dec 2023 05:45) (97% - 98%)    Parameters below as of 08 Dec 2023 05:45  Patient On (Oxygen Delivery Method): room air      I&O's Summary      TELE: Not on telemetry   PHYSICAL EXAM:  Constitutional: NAD, awake and alert  HEENT: Moist Mucous Membranes, Anicteric  Pulmonary: Non-labored, breath sounds are clear bilaterally, No wheezing, rales or rhonchi  Cardiovascular: Regular, S1 and S2, No murmurs, rubs, gallops or clicks  Gastrointestinal: Bowel Sounds present, soft, nontender.   Lymph: trace peripheral edema. No lymphadenopathy.  Skin: No visible rashes or ulcers.  Psych:  Mood & affect appropriate  LABS: All Labs Reviewed:                                 14.3   11.43 )-----------( 365      ( 08 Dec 2023 07:34 )             42.3                         13.5   9.85  )-----------( 204      ( 07 Dec 2023 08:55 )             39.7                         12.9   9.07  )-----------( 290      ( 06 Dec 2023 09:25 )             37.7     08 Dec 2023 07:34    133    |  98     |  18     ----------------------------<  215    4.1     |  29     |  1.20   07 Dec 2023 08:55    134    |  99     |  15     ----------------------------<  222    4.8     |  30     |  1.20   06 Dec 2023 09:25    133    |  94     |  10     ----------------------------<  252    3.8     |  30     |  1.20     Ca    9.4        08 Dec 2023 07:34  Ca    9.7        07 Dec 2023 08:55  Ca    9.2        06 Dec 2023 09:25    TPro  7.5    /  Alb  3.0    /  TBili  0.7    /  DBili  x      /  AST  10     /  ALT  12     /  AlkPhos  115    06 Dec 2023 09:25          12 Lead ECG:   Ventricular Rate 55 BPM    Atrial Rate 55 BPM    P-R Interval 150 ms    QRS Duration 90 ms    Q-T Interval 434 ms    QTC Calculation(Bazett) 415 ms    P Axis 59 degrees    R Axis -17 degrees    T Axis 20 degrees    Diagnosis Line Sinus bradycardia  Otherwise normal ECG  No previous ECGs available  Confirmed by deena Goff ([id number]) on 12/5/2023 1:27:39 PM (12-04-23 @ 14:12)

## 2023-12-08 NOTE — DISCHARGE NOTE PROVIDER - NSDCFUSCHEDAPPT_GEN_ALL_CORE_FT
Poonam Dobbs  [facility]  WOUNDCARE  [place]  Scheduled Appointment: 12/13/2023     Poonam Dobbs  [facility]  WOUNDCARE  [place]  Scheduled Appointment: 12/13/2023

## 2023-12-08 NOTE — PROGRESS NOTE ADULT - REASON FOR ADMISSION
Foot wound

## 2023-12-08 NOTE — PROGRESS NOTE ADULT - SUBJECTIVE AND OBJECTIVE BOX
[facility]   INFECTIOUS DISEASES   [address] [place] [address]  Tel: [phone]     Fax: [phone]  =========================================================  MD Cedrick Sanz Kaushal, MD Cho, Michelle, MD Sunjit, Jaspal, MD  =========================================================    VALENTIN CERON [id number]    Follow up: Left Foot wound     No new changes, no fever, no acute incidents overnight. No pain.     Allergies:  No Known Allergies    MEDICATIONS  (STANDING):  atorvastatin 10 milliGRAM(s) Oral at bedtime  cefTRIAXone   IVPB 2000 milliGRAM(s) IV Intermittent every 24 hours  chlorhexidine 4% Liquid 1 Application(s) Topical <User Schedule>  dextrose 50% Injectable 12.5 Gram(s) IV Push once  dextrose 50% Injectable 25 Gram(s) IV Push once  dextrose 50% Injectable 25 Gram(s) IV Push once  glucagon  Injectable 1 milliGRAM(s) IntraMuscular once  heparin   Injectable 5000 Unit(s) SubCutaneous every 8 hours  hydrochlorothiazide 25 milliGRAM(s) Oral daily  influenza  Vaccine (HIGH DOSE) 0.7 milliLiter(s) IntraMuscular once  insulin glargine Injectable (LANTUS) 10 Unit(s) SubCutaneous every morning  insulin lispro (ADMELOG) corrective regimen sliding scale   SubCutaneous three times a day before meals  insulin lispro (ADMELOG) corrective regimen sliding scale   SubCutaneous at bedtime  levothyroxine 50 MICROGram(s) Oral daily  losartan 100 milliGRAM(s) Oral daily  metoprolol succinate ER 25 milliGRAM(s) Oral daily  mupirocin 2% Nasal 1 Application(s) Both Nostrils two times a day    REVIEW OF SYSTEMS:  CONSTITUTIONAL:  No Fever or chills  HEENT:  No diplopia or blurred vision.  No sore throat or runny nose.  CARDIOVASCULAR:  No chest pain or SOB.  RESPIRATORY:  No cough, shortness of breath, PND or orthopnea.  GASTROINTESTINAL:  No nausea, vomiting or diarrhea.  GENITOURINARY:  No dysuria, frequency or urgency. No Blood in urine  MUSCULOSKELETAL:  no joint aches, no muscle pain  SKIN:  foot ulcer   NEUROLOGIC:  No paresthesias or weakness.  PSYCHIATRIC:  No disorder of thought or mood.  ENDOCRINE:  No heat or cold intolerance, polyuria or polydipsia.  HEMATOLOGICAL:  No easy bruising or bleeding.      Physical Exam:  Vital Signs Last 24 Hrs  T(C): 36.6 (08 Dec 2023 11:14), Max: 36.8 (07 Dec 2023 19:47)  T(F): 97.9 (08 Dec 2023 11:14), Max: 98.3 (07 Dec 2023 19:47)  HR: 66 (08 Dec 2023 11:14) (62 - 73)  BP: 114/70 (08 Dec 2023 11:14) (114/70 - 187/83)  BP(mean): --  RR: 18 (08 Dec 2023 11:14) (18 - 18)  SpO2: 97% (08 Dec 2023 11:14) (97% - 98%)  GEN: NAD  HEENT: normocephalic and atraumatic. EOMI. PERRL.    NECK: Supple.  No lymphadenopathy   LUNGS: Clear to auscultation.  HEART: Regular rate and rhythm without murmur.  ABDOMEN: Soft, nontender, and nondistended.  Positive bowel sounds.    : No CVA tenderness  EXTREMITIES: Without edema.  NEUROLOGIC: grossly intact.  PSYCHIATRIC: Appropriate affect .  SKIN: post amputation dressed     Labs:                        14.3   11.43 )-----------( 365      ( 08 Dec 2023 07:34 )             42.3     12-08    133<L>  |  98  |  18  ----------------------------<  215<H>  4.1   |  29  |  1.20    Ca    9.4      08 Dec 2023 07:34    Culture - Tissue with Gram Stain (collected 12-05-23 @ 11:00)  Source: .Tissue Other, LEFT FOOT 4TH METATARSO HEAD  Gram Stain (12-05-23 @ 22:43):    Rare polymorphonuclear leukocytes per low power field    Few Gram positive cocci in pairs per oil power field  Final Report (12-07-23 @ 11:48):    Rare Klebsiella pneumoniae    Few Streptococcus mitis/oralis group "Susceptibilities not performed"    Few Prevotella bivia "Susceptibilities not performed"    Few Bacteroides fragilis "Susceptibilities not performed"  Organism: Klebsiella pneumoniae (12-07-23 @ 11:48)  Organism: Klebsiella pneumoniae (12-07-23 @ 11:48)    Sensitivities:      -  Levofloxacin: S <=0.5      -  Tobramycin: S <=2      -  Aztreonam: S <=4      -  Gentamicin: S <=2      -  Cefepime: S <=2      -  Cefazolin: S <=2      -  Piperacillin/Tazobactam: S <=8      -  Ciprofloxacin: S <=0.25      -  Imipenem: S <=1      -  Ceftriaxone: S <=1      -  Ampicillin: R >16 These ampicillin results predict results for amoxicillin      Method Type: ROSALIO      -  Meropenem: S <=1      -  Ampicillin/Sulbactam: S 8/4      -  Cefoxitin: S <=8      -  Amoxicillin/Clavulanic Acid: S <=8/4      -  Trimethoprim/Sulfamethoxazole: S <=0.5/9.5      -  Ertapenem: S <=0.5    Culture - Tissue with Gram Stain (collected 12-05-23 @ 11:00)  Source: .Tissue Other, LEFT FOOT 4TH TOE BONE CULTURE  Gram Stain (12-05-23 @ 22:43):    Rare polymorphonuclear leukocytes per low power field    Numerous Gram Negative Rods per oil power field    Numerous Gram positive cocci in pairs per oil power field    Few Gram Positive Rods per oil power field  Final Report (12-07-23 @ 11:46):    Few Klebsiella pneumoniae    Moderate Streptococcus mitis/oralis group "Susceptibilities not performed"    Few Prevotella bivia "Susceptibilities not performed"    Rare Bacteroides fragilis "Susceptibilities not performed"  Organism: Klebsiella pneumoniae (12-07-23 @ 11:46)  Organism: Klebsiella pneumoniae (12-07-23 @ 11:46)    Sensitivities:      -  Levofloxacin: S <=0.5      -  Tobramycin: S <=2      -  Aztreonam: S <=4      -  Gentamicin: S <=2      -  Cefepime: S <=2      -  Cefazolin: S <=2      -  Piperacillin/Tazobactam: S <=8      -  Ciprofloxacin: S <=0.25      -  Imipenem: S <=1      -  Ceftriaxone: S <=1      -  Ampicillin: R >16 These ampicillin results predict results for amoxicillin      Method Type: ROSALIO      -  Meropenem: S <=1      -  Ampicillin/Sulbactam: S 8/4      -  Cefoxitin: S <=8      -  Amoxicillin/Clavulanic Acid: S <=8/4      -  Trimethoprim/Sulfamethoxazole: S <=0.5/9.5      -  Ertapenem: S <=0.5    Culture - Blood (collected 12-04-23 @ 13:00)  Source: .Blood Blood-Peripheral  Preliminary Report (12-07-23 @ 19:01):    No growth at 72 Hours    Culture - Blood (collected 12-04-23 @ 12:41)  Source: .Blood Blood-Peripheral  Preliminary Report (12-07-23 @ 19:01):    No growth at 72 Hours    Culture - Other (collected 09-26-23 @ 10:18)  Source: .Other left plantar foot ulcer  Final Report (09-28-23 @ 16:53):    Numerous Staphylococcus aureus  Organism: Staphylococcus aureus (09-28-23 @ 16:53)  Organism: Staphylococcus aureus (09-28-23 @ 16:53)    Sensitivities:      -  Clindamycin: S <=0.25      -  Oxacillin: S 0.5 Oxacillin predicts susceptibility for dicloxacillin, methicillin, and nafcillin      -  Gentamicin: S <=1 Should not be used as monotherapy      -  Cefazolin: S <=4      -  Vancomycin: S 2      -  Tetracycline: S <=1      Method Type: ROSALIO      -  Ampicillin/Sulbactam: S <=8/4      -  Penicillin: R >8      -  Rifampin: S <=1 Should not be used as monotherapy      -  Erythromycin: S <=0.25      -  Trimethoprim/Sulfamethoxazole: S <=0.5/9.5    Culture - Other (collected 05-31-23 @ 14:50)  Source: .Other Wound- left plantar foot  Final Report (06-02-23 @ 18:26):    Culture yields growth of greater than 3 colony types of    bacteria,  which may indicate contamination and normal alvin    Call client services within 7 days if further workup is clinically    indicated. Culture includes    Few Methicillin Resistant Staphylococcus aureus  Organism: Methicillin resistant Staphylococcus aureus (06-02-23 @ 18:26)  Organism: Methicillin resistant Staphylococcus aureus (06-02-23 @ 18:26)    Sensitivities:      -  Levofloxacin: I 4      -  Clindamycin: S <=0.25      -  Oxacillin: R >2      -  Gentamicin: R >8 Should not be used as monotherapy      -  Daptomycin: S 0.5      -  Linezolid: S 2      -  Cefazolin: R <=4      -  Moxifloxacin: R 2      -  Vancomycin: S 2      -  Ciprofloxacin: R >2      -  Ceftriaxone: R <=4      -  Ampicillin: R >8      -  Tetracycline: S <=1      Method Type: ROSALIO      -  Meropenem: R <=2      -  Ampicillin/Sulbactam: R <=8/4      -  Penicillin: R >8      -  Rifampin: S <=1 Should not be used as monotherapy      -  Erythromycin: R >4      -  Amoxicillin/Clavulanic Acid: R <=4/2      -  Trimethoprim/Sulfamethoxazole: S <=0.5/9.5    WBC Count: 11.43 K/uL (12-08-23 @ 07:34)  WBC Count: 9.85 K/uL (12-07-23 @ 08:55)  WBC Count: 9.07 K/uL (12-06-23 @ 09:25)  WBC Count: 9.65 K/uL (12-05-23 @ 08:00)  WBC Count: 11.96 K/uL (12-04-23 @ 12:57)    Creatinine: 1.20 mg/dL (12-08-23 @ 07:34)  Creatinine: 1.20 mg/dL (12-07-23 @ 08:55)  Creatinine: 1.20 mg/dL (12-06-23 @ 09:25)  Creatinine: 1.20 mg/dL (12-05-23 @ 08:00)  Creatinine: 1.20 mg/dL (12-04-23 @ 12:57)    C-Reactive Protein, Serum: 38 mg/L (12-04-23 @ 12:57)    Sedimentation Rate, Erythrocyte: 74 mm/hr (12-04-23 @ 12:57)    All imaging and data are reviewed.   < from: Xray Foot AP + Lateral + Oblique, Bilat (12.04.23 @ 13:45) >  IMPRESSION:  There is extensive irregular bone destruction involving the proximal and   middle phalanx of the fourth toe consistent with acute osteomyelitis.   Remaining osseous structures are intact without fracture or dislocation.   Calcaneal osteophytes. Vascular calcification. No soft tissue gas.    Assessment and Plan:   74 yo man with PMH of HTN, DM2, and Hypothyroidism was admitted with an ulcer in left 4th toe. He has chronic pain to the left foot that started 2-3 months ago and has progressively worsened. Pt states that he could see the bone, follows with wound care who recommended amputation.     # Left 4th toe ulcer     - Bone and soft tissue culture with Klebsiella and strep   - The old culture with MRSA and latest one with MSSA,   - Continue ceftriaxone 2gm daily covering MSSA, Klebsiella and strep   - S/P amputation of 4th toe and metatarsal head 12/5/23  - Pathology with mild focal osteomyelitis in proximal margin  - PICC was placed this morning   - Will need 6 weeks treatment  - Last day wound be 1/15/2024  - CBC, CMP and CRP weekly   - Wound care clinic follow up    Will sign off please call with any question.     Roverto Rincon MD  Division of Infectious Diseases   Please call ID service at [phone] with any question.      50 minutes spent on total encounter assessing patient, examination, chart review, counseling and coordinating care by the attending physician/nurse/care manager.   [facility]   INFECTIOUS DISEASES   [address] [place] [address]  Tel: [phone]     Fax: [phone]  =========================================================  MD Cedrick Sanz Kaushal, MD Cho, Michelle, MD Sunjit, Jaspal, MD  =========================================================    VALENTIN CERON [id number]    Follow up: Left Foot wound     No new changes, no fever, no acute incidents overnight. No pain.     Allergies:  No Known Allergies    MEDICATIONS  (STANDING):  atorvastatin 10 milliGRAM(s) Oral at bedtime  cefTRIAXone   IVPB 2000 milliGRAM(s) IV Intermittent every 24 hours  chlorhexidine 4% Liquid 1 Application(s) Topical <User Schedule>  dextrose 50% Injectable 12.5 Gram(s) IV Push once  dextrose 50% Injectable 25 Gram(s) IV Push once  dextrose 50% Injectable 25 Gram(s) IV Push once  glucagon  Injectable 1 milliGRAM(s) IntraMuscular once  heparin   Injectable 5000 Unit(s) SubCutaneous every 8 hours  hydrochlorothiazide 25 milliGRAM(s) Oral daily  influenza  Vaccine (HIGH DOSE) 0.7 milliLiter(s) IntraMuscular once  insulin glargine Injectable (LANTUS) 10 Unit(s) SubCutaneous every morning  insulin lispro (ADMELOG) corrective regimen sliding scale   SubCutaneous three times a day before meals  insulin lispro (ADMELOG) corrective regimen sliding scale   SubCutaneous at bedtime  levothyroxine 50 MICROGram(s) Oral daily  losartan 100 milliGRAM(s) Oral daily  metoprolol succinate ER 25 milliGRAM(s) Oral daily  mupirocin 2% Nasal 1 Application(s) Both Nostrils two times a day    REVIEW OF SYSTEMS:  CONSTITUTIONAL:  No Fever or chills  HEENT:  No diplopia or blurred vision.  No sore throat or runny nose.  CARDIOVASCULAR:  No chest pain or SOB.  RESPIRATORY:  No cough, shortness of breath, PND or orthopnea.  GASTROINTESTINAL:  No nausea, vomiting or diarrhea.  GENITOURINARY:  No dysuria, frequency or urgency. No Blood in urine  MUSCULOSKELETAL:  no joint aches, no muscle pain  SKIN:  foot ulcer   NEUROLOGIC:  No paresthesias or weakness.  PSYCHIATRIC:  No disorder of thought or mood.  ENDOCRINE:  No heat or cold intolerance, polyuria or polydipsia.  HEMATOLOGICAL:  No easy bruising or bleeding.      Physical Exam:  Vital Signs Last 24 Hrs  T(C): 36.6 (08 Dec 2023 11:14), Max: 36.8 (07 Dec 2023 19:47)  T(F): 97.9 (08 Dec 2023 11:14), Max: 98.3 (07 Dec 2023 19:47)  HR: 66 (08 Dec 2023 11:14) (62 - 73)  BP: 114/70 (08 Dec 2023 11:14) (114/70 - 187/83)  BP(mean): --  RR: 18 (08 Dec 2023 11:14) (18 - 18)  SpO2: 97% (08 Dec 2023 11:14) (97% - 98%)  GEN: NAD  HEENT: normocephalic and atraumatic. EOMI. PERRL.    NECK: Supple.  No lymphadenopathy   LUNGS: Clear to auscultation.  HEART: Regular rate and rhythm without murmur.  ABDOMEN: Soft, nontender, and nondistended.  Positive bowel sounds.    : No CVA tenderness  EXTREMITIES: Without edema.  NEUROLOGIC: grossly intact.  PSYCHIATRIC: Appropriate affect .  SKIN: post amputation dressed     Labs:                        14.3   11.43 )-----------( 365      ( 08 Dec 2023 07:34 )             42.3     12-08    133<L>  |  98  |  18  ----------------------------<  215<H>  4.1   |  29  |  1.20    Ca    9.4      08 Dec 2023 07:34    Culture - Tissue with Gram Stain (collected 12-05-23 @ 11:00)  Source: .Tissue Other, LEFT FOOT 4TH METATARSO HEAD  Gram Stain (12-05-23 @ 22:43):    Rare polymorphonuclear leukocytes per low power field    Few Gram positive cocci in pairs per oil power field  Final Report (12-07-23 @ 11:48):    Rare Klebsiella pneumoniae    Few Streptococcus mitis/oralis group "Susceptibilities not performed"    Few Prevotella bivia "Susceptibilities not performed"    Few Bacteroides fragilis "Susceptibilities not performed"  Organism: Klebsiella pneumoniae (12-07-23 @ 11:48)  Organism: Klebsiella pneumoniae (12-07-23 @ 11:48)    Sensitivities:      -  Levofloxacin: S <=0.5      -  Tobramycin: S <=2      -  Aztreonam: S <=4      -  Gentamicin: S <=2      -  Cefepime: S <=2      -  Cefazolin: S <=2      -  Piperacillin/Tazobactam: S <=8      -  Ciprofloxacin: S <=0.25      -  Imipenem: S <=1      -  Ceftriaxone: S <=1      -  Ampicillin: R >16 These ampicillin results predict results for amoxicillin      Method Type: ROSALIO      -  Meropenem: S <=1      -  Ampicillin/Sulbactam: S 8/4      -  Cefoxitin: S <=8      -  Amoxicillin/Clavulanic Acid: S <=8/4      -  Trimethoprim/Sulfamethoxazole: S <=0.5/9.5      -  Ertapenem: S <=0.5    Culture - Tissue with Gram Stain (collected 12-05-23 @ 11:00)  Source: .Tissue Other, LEFT FOOT 4TH TOE BONE CULTURE  Gram Stain (12-05-23 @ 22:43):    Rare polymorphonuclear leukocytes per low power field    Numerous Gram Negative Rods per oil power field    Numerous Gram positive cocci in pairs per oil power field    Few Gram Positive Rods per oil power field  Final Report (12-07-23 @ 11:46):    Few Klebsiella pneumoniae    Moderate Streptococcus mitis/oralis group "Susceptibilities not performed"    Few Prevotella bivia "Susceptibilities not performed"    Rare Bacteroides fragilis "Susceptibilities not performed"  Organism: Klebsiella pneumoniae (12-07-23 @ 11:46)  Organism: Klebsiella pneumoniae (12-07-23 @ 11:46)    Sensitivities:      -  Levofloxacin: S <=0.5      -  Tobramycin: S <=2      -  Aztreonam: S <=4      -  Gentamicin: S <=2      -  Cefepime: S <=2      -  Cefazolin: S <=2      -  Piperacillin/Tazobactam: S <=8      -  Ciprofloxacin: S <=0.25      -  Imipenem: S <=1      -  Ceftriaxone: S <=1      -  Ampicillin: R >16 These ampicillin results predict results for amoxicillin      Method Type: ROSALIO      -  Meropenem: S <=1      -  Ampicillin/Sulbactam: S 8/4      -  Cefoxitin: S <=8      -  Amoxicillin/Clavulanic Acid: S <=8/4      -  Trimethoprim/Sulfamethoxazole: S <=0.5/9.5      -  Ertapenem: S <=0.5    Culture - Blood (collected 12-04-23 @ 13:00)  Source: .Blood Blood-Peripheral  Preliminary Report (12-07-23 @ 19:01):    No growth at 72 Hours    Culture - Blood (collected 12-04-23 @ 12:41)  Source: .Blood Blood-Peripheral  Preliminary Report (12-07-23 @ 19:01):    No growth at 72 Hours    Culture - Other (collected 09-26-23 @ 10:18)  Source: .Other left plantar foot ulcer  Final Report (09-28-23 @ 16:53):    Numerous Staphylococcus aureus  Organism: Staphylococcus aureus (09-28-23 @ 16:53)  Organism: Staphylococcus aureus (09-28-23 @ 16:53)    Sensitivities:      -  Clindamycin: S <=0.25      -  Oxacillin: S 0.5 Oxacillin predicts susceptibility for dicloxacillin, methicillin, and nafcillin      -  Gentamicin: S <=1 Should not be used as monotherapy      -  Cefazolin: S <=4      -  Vancomycin: S 2      -  Tetracycline: S <=1      Method Type: ROSALIO      -  Ampicillin/Sulbactam: S <=8/4      -  Penicillin: R >8      -  Rifampin: S <=1 Should not be used as monotherapy      -  Erythromycin: S <=0.25      -  Trimethoprim/Sulfamethoxazole: S <=0.5/9.5    Culture - Other (collected 05-31-23 @ 14:50)  Source: .Other Wound- left plantar foot  Final Report (06-02-23 @ 18:26):    Culture yields growth of greater than 3 colony types of    bacteria,  which may indicate contamination and normal alvin    Call client services within 7 days if further workup is clinically    indicated. Culture includes    Few Methicillin Resistant Staphylococcus aureus  Organism: Methicillin resistant Staphylococcus aureus (06-02-23 @ 18:26)  Organism: Methicillin resistant Staphylococcus aureus (06-02-23 @ 18:26)    Sensitivities:      -  Levofloxacin: I 4      -  Clindamycin: S <=0.25      -  Oxacillin: R >2      -  Gentamicin: R >8 Should not be used as monotherapy      -  Daptomycin: S 0.5      -  Linezolid: S 2      -  Cefazolin: R <=4      -  Moxifloxacin: R 2      -  Vancomycin: S 2      -  Ciprofloxacin: R >2      -  Ceftriaxone: R <=4      -  Ampicillin: R >8      -  Tetracycline: S <=1      Method Type: ROSALIO      -  Meropenem: R <=2      -  Ampicillin/Sulbactam: R <=8/4      -  Penicillin: R >8      -  Rifampin: S <=1 Should not be used as monotherapy      -  Erythromycin: R >4      -  Amoxicillin/Clavulanic Acid: R <=4/2      -  Trimethoprim/Sulfamethoxazole: S <=0.5/9.5    WBC Count: 11.43 K/uL (12-08-23 @ 07:34)  WBC Count: 9.85 K/uL (12-07-23 @ 08:55)  WBC Count: 9.07 K/uL (12-06-23 @ 09:25)  WBC Count: 9.65 K/uL (12-05-23 @ 08:00)  WBC Count: 11.96 K/uL (12-04-23 @ 12:57)    Creatinine: 1.20 mg/dL (12-08-23 @ 07:34)  Creatinine: 1.20 mg/dL (12-07-23 @ 08:55)  Creatinine: 1.20 mg/dL (12-06-23 @ 09:25)  Creatinine: 1.20 mg/dL (12-05-23 @ 08:00)  Creatinine: 1.20 mg/dL (12-04-23 @ 12:57)    C-Reactive Protein, Serum: 38 mg/L (12-04-23 @ 12:57)    Sedimentation Rate, Erythrocyte: 74 mm/hr (12-04-23 @ 12:57)    All imaging and data are reviewed.   < from: Xray Foot AP + Lateral + Oblique, Bilat (12.04.23 @ 13:45) >  IMPRESSION:  There is extensive irregular bone destruction involving the proximal and   middle phalanx of the fourth toe consistent with acute osteomyelitis.   Remaining osseous structures are intact without fracture or dislocation.   Calcaneal osteophytes. Vascular calcification. No soft tissue gas.    Assessment and Plan:   74 yo man with PMH of HTN, DM2, and Hypothyroidism was admitted with an ulcer in left 4th toe. He has chronic pain to the left foot that started 2-3 months ago and has progressively worsened. Pt states that he could see the bone, follows with wound care who recommended amputation.     # Left 4th toe ulcer     - Bone and soft tissue culture with Klebsiella and strep   - The old culture with MRSA and latest one with MSSA,   - Continue ceftriaxone 2gm daily covering MSSA, Klebsiella and strep   - S/P amputation of 4th toe and metatarsal head 12/5/23  - Pathology with mild focal osteomyelitis in proximal margin  - PICC was placed this morning   - Will need 6 weeks treatment  - Last day wound be 1/15/2024  - CBC, CMP and CRP weekly   - Wound care clinic follow up    Will sign off please call with any question.     Roverto Rincon MD  Division of Infectious Diseases   Please call ID service at [phone] with any question.      50 minutes spent on total encounter assessing patient, examination, chart review, counseling and coordinating care by the attending physician/nurse/care manager.

## 2023-12-08 NOTE — DISCHARGE NOTE NURSING/CASE MANAGEMENT/SOCIAL WORK - NSDCPEFALRISK_GEN_ALL_CORE
For information on Fall & Injury Prevention, visit: [email or web address] OR  [email or web address] OR  https://www.cdc.gov/steadi/patient.html For information on Fall & Injury Prevention, visit: [email or web address] OR  [email or web address] OR  https://www.cdc.gov/steadi/patient.html

## 2023-12-08 NOTE — DISCHARGE NOTE NURSING/CASE MANAGEMENT/SOCIAL WORK - PATIENT PORTAL LINK FT
You can access the FollowMyHealth Patient Portal offered by [facility] by registering at the following website: [email or web address]. By joining [employer]’s FollowMyHealth portal, you will also be able to view your health information using other applications (apps) compatible with our system. You can access the FollowMyHealth Patient Portal offered by [facility] by registering at the following website: [email or web address]. By joining [employer]’s FollowMyHealth portal, you will also be able to view your health information using other applications (apps) compatible with our system.

## 2023-12-08 NOTE — PROGRESS NOTE ADULT - PROBLEM SELECTOR PLAN 1
add lantus 10 units qam  cont mod dose admelog corrective scale coverage qac/qhs  cont cons cho diet  goal bg 100-180 in hosp setting

## 2023-12-08 NOTE — DIETITIAN INITIAL EVALUATION ADULT - PERTINENT MEDS FT
MEDICATIONS  (STANDING):  atorvastatin 10 milliGRAM(s) Oral at bedtime  cefTRIAXone   IVPB 2000 milliGRAM(s) IV Intermittent every 24 hours  chlorhexidine 4% Liquid 1 Application(s) Topical <User Schedule>  dextrose 50% Injectable 25 Gram(s) IV Push once  dextrose 50% Injectable 12.5 Gram(s) IV Push once  dextrose 50% Injectable 25 Gram(s) IV Push once  glucagon  Injectable 1 milliGRAM(s) IntraMuscular once  heparin   Injectable 5000 Unit(s) SubCutaneous every 8 hours  hydrochlorothiazide 25 milliGRAM(s) Oral daily  influenza  Vaccine (HIGH DOSE) 0.7 milliLiter(s) IntraMuscular once  insulin glargine Injectable (LANTUS) 10 Unit(s) SubCutaneous every morning  insulin lispro (ADMELOG) corrective regimen sliding scale   SubCutaneous three times a day before meals  insulin lispro (ADMELOG) corrective regimen sliding scale   SubCutaneous at bedtime  levothyroxine 50 MICROGram(s) Oral daily  losartan 100 milliGRAM(s) Oral daily  metoprolol succinate ER 25 milliGRAM(s) Oral daily  mupirocin 2% Nasal 1 Application(s) Both Nostrils two times a day    MEDICATIONS  (PRN):  acetaminophen     Tablet .. 650 milliGRAM(s) Oral every 6 hours PRN Temp greater or equal to 38C (100.4F), Mild Pain (1 - 3)  dextrose Oral Gel 15 Gram(s) Oral once PRN Blood Glucose LESS THAN 70 milliGRAM(s)/deciliter  melatonin 3 milliGRAM(s) Oral at bedtime PRN Insomnia  sodium chloride 0.9% lock flush 10 milliLiter(s) IV Push every 1 hour PRN Pre/post blood products, medications, blood draw, and to maintain line patency

## 2023-12-08 NOTE — PROCEDURE NOTE - ADDITIONAL PROCEDURE DETAILS
44cm bard single lumen power picc inserted into patent right basilic vein under sterile conditions and image guidance.  Tip is in the SVC.  PICC can be accessed.

## 2023-12-08 NOTE — DIETITIAN INITIAL EVALUATION ADULT - PROBLEM SELECTOR PLAN 4
Chronic, History of DM2  - Hold home medications  - Low dose insulin corrective scale  - Hypoglycemia protocol, Accuchecks AC&HS  - Diet regular food with DASH  - F/u HbA1c

## 2023-12-08 NOTE — DIETITIAN INITIAL EVALUATION ADULT - PERTINENT LABORATORY DATA
12-08    133<L>  |  98  |  18  ----------------------------<  215<H>  4.1   |  29  |  1.20    Ca    9.4      08 Dec 2023 07:34    POCT Blood Glucose.: 299 mg/dL (12-08-23 @ 11:36)  A1C with Estimated Average Glucose Result: 9.5 % (12-05-23 @ 08:00)  A1C with Estimated Average Glucose Result: 9.5 % (12-04-23 @ 12:57)

## 2023-12-08 NOTE — CASE MANAGEMENT PROGRESS NOTE - NSCMPROGRESSNOTE_GEN_ALL_CORE
CM met with pt. Pt is to be transitioned to home with [facility] [employer] [phone]/ fax [phone] confirmed with agency pt case is being accepted and was made aware of DC plan today 12/8/23 with start of care 12/9/23. Pt aware of plan and in agreement. Confirmed pharmacy is [facility] and community MD is Dr Cain. Pt stated he would make his own follow up appointments. Pt stated his son will pick him up. CM discussed plan with MD and RN. CM to remain available  CM met with pt. Pt is to be transitioned to home with [facility] [phone] fax [phone] confirmed with agency pt case is being accepted and was made aware of DC plan today 12/8/23 with start of care 12/9/23. Pt aware of plan and in agreement. Confirmed pharmacy is [facility] and community MD is Dr Cain. Pt stated he would make his own follow up appointments. Pt stated his son will pick him up. CM discussed plan with MD and RN. CM to remain available  CM met with pt. Pt is to be transitioned to home with [facility] [phone] confirmed with agency pt case is being accepted and was made aware of DC plan today 12/8/23 with start of care 12/9/23. Referral sent to [facility] [phone] for rolling walker representative to bring rolling walker to pt bedside upon approval. Pt aware of plan and in agreement. Confirmed pharmacy is [facility] and [facility] MD is Dr Cain. Pt stated he would make his own follow up appointments. Pt stated his son will pick him up. CM discussed plan with MD and RN. CM to remain available  CM met with pt. Pt is to be transitioned to home with [facility] [phone] fax [phone] confirmed with agency pt case is being accepted and was made aware of DC plan today 12/8/23 with start of care 12/9/23. Referral sent to [facility] [phone] for rolling walker representative to bring rolling walker to pt bedside upon approval. Pt aware of plan and in agreement. Confirmed pharmacy is [facility] and [facility] MD is Dr Cain. Pt stated he would make his own follow up appointments. Pt stated his son will pick him up. CM discussed plan with MD and RN. CM to remain available

## 2023-12-08 NOTE — PROGRESS NOTE ADULT - PROVIDER SPECIALTY LIST ADULT
Cardiology
Infectious Disease
Infectious Disease
Hospitalist
Infectious Disease
Cardiology
Cardiology
Infectious Disease
Cardiology
Podiatry
Podiatry
Endocrinology

## 2023-12-08 NOTE — PROGRESS NOTE ADULT - NS ATTEND AMEND GEN_ALL_CORE FT
73 yoM with PMH of HTN, DM type 2 (on insulin), Hypothyroidism, and hyperlipidemia presents to the ED by wound care for evaluation of left 4th toe wound.    He has no cardiac complaints at this time.   EKG with sinus bradycardia, no ST changes. On BB at home.   bp high, add hydral for better control.   s/p S/P amputation of 4th toe and metatarsal head on 12/5, without issue pain control  will follow with you.
73 yoM with PMH of HTN, DM type 2 (on insulin), Hypothyroidism, and hyperlipidemia presents to the ED by wound care for evaluation of left 4th toe wound.    He has no cardiac complaints at this time.   EKG with sinus bradycardia, no ST changes. On BB at home.   bp high, add hydral  s/p S/P amputation of 4th toe and metatarsal head on 12/5, without issue  will follow with you.
73 yoM with PMH of HTN, DM type 2 (on insulin), Hypothyroidism, and hyperlipidemia presents to the ED by wound care for evaluation of left 4th toe wound.    He has no cardiac complaints at this time.   EKG with sinus bradycardia, no ST changes. On BB at home.   BP uncontrolled on arrival likely somewhat due to his pain.   Resume home bp medications  s/p S/P amputation of 4th toe and metatarsal head on 12/5, without issue  will follow with you
73 yoM with PMH of HTN, DM type 2 (on insulin), Hypothyroidism, and hyperlipidemia presents to the ED by wound care for evaluation of left 4th toe wound.    He has no cardiac complaints at this time.   EKG with sinus bradycardia, no ST changes. On BB at home.   bp high, add hydral for better control.   s/p S/P amputation of 4th toe and metatarsal head on 12/5, without issue pain control  will follow with you.

## 2023-12-08 NOTE — DIETITIAN INITIAL EVALUATION ADULT - NS FNS DIET ORDER
Diet, Consistent Carbohydrate w/Evening Snack:   DASH/TLC {Sodium & Cholesterol Restricted}  Ovo Vegetarian (Accepts Eggs) (12-05-23 @ 15:09) [Active]

## 2023-12-08 NOTE — DIETITIAN INITIAL EVALUATION ADULT - PROBLEM SELECTOR PLAN 1
-sent in by wound care for evaluation for worsening left 4th toe wound  -WBC 11.96, afebrile, does not meet sepsis criteria at this time  -MRI: Plantar soft tissue wound present at the level of the first metatarsal phalangeal joint without underlying osteomyelitis   -s/p cefazolin 1000mg x1, Acetominophen 650mg  -podiatry consulted, f/u recommendations  -continue cefazolin  -ID consulted- Dr Rincon

## 2023-12-08 NOTE — PRE PROCEDURE NOTE - PRE PROCEDURE EVALUATION
Interventional Radiology    HPI: 73y Male with PMH of HTN, DM2, and Hypothyroidism was admitted with an ulcer in left 4th toe  S/P amputation of 4th toe and metatarsal head 12/5/23 and Pathology showing mild focal osteomyelitis in proximal margin.  Per ID, will require 6 weeks IV Abx.    Allergies: No Known Allergies    Medications (Abx/Cardiac/Anticoagulation/Blood Products)    ceFAZolin   IVPB: 100 mL/Hr IV Intermittent (12-07 @ 05:42)  cefTRIAXone   IVPB: 100 mL/Hr IV Intermittent (12-07 @ 13:21)  heparin   Injectable: 5000 Unit(s) SubCutaneous (12-08 @ 05:46)  hydrochlorothiazide: 12.5 milliGRAM(s) Oral (12-07 @ 17:09)  hydrochlorothiazide: 12.5 milliGRAM(s) Oral (12-07 @ 05:42)  hydrochlorothiazide: 25 milliGRAM(s) Oral (12-08 @ 05:47)  losartan: 100 milliGRAM(s) Oral (12-08 @ 05:47)  metoprolol succinate ER: 25 milliGRAM(s) Oral (12-08 @ 05:46)    Data:    T(C): 36.6  HR: 62  BP: 187/83  RR: 18  SpO2: 97%    Exam  General: No acute distress  Chest: Non labored breathing  Abdomen: Non-distended  Extremities: No swelling, warm    -WBC 11.43 / HgB 14.3 / Hct 42.3 / Plt 365  -Na 133 / Cl 98 / BUN 18 / Glucose 215  -K 4.1 / CO2 29 / Cr 1.20  -ALT -- / Alk Phos -- / T.Bili --  -INR1.04    Imaging: reviewed    Plan: 73y Male presents for PICC line  -Risks/Benefits/alternatives explained with the patient and/or healthcare proxy and witnessed informed consent obtained.

## 2023-12-08 NOTE — DISCHARGE NOTE PROVIDER - NSDCCPCAREPLAN_GEN_ALL_CORE_FT
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  PICC line has been  Please complete the IV antibiotics to completion as directed       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  PICC line has been  Please complete the IV antibiotics to completion as directed  Wound Care Instructions:  -Keep dressing clean, dry, and intact to the left  foot  -Change dressing to the left foot every other day  - Apply silver alginate and cover with sterile gauze, abd pad and neil   -Patient must remain partial weightbearing to the right heel in surgical shoe only for necessary activities    -Monitor for any signs of infection including redness, swelling in the leg above the bandage, nausea/vomiting/fever/chills/chest pain/shortness of breath, if any are present patient must report to the emergency department immediately  -Patient is to follow up with Dr. Staton/Dr. Paz/ Dr. Dobbs  within 5 days after discharge at [facility] [place]. Please call to make an appointment [phone].       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  PICC line has been  Please complete the IV antibiotics to completion as directed  Wound Care Instructions:  -Keep dressing clean, dry, and intact to the left  foot  -Change dressing to the left foot every other day  - Apply silver alginate and cover with sterile gauze, abd pad and neil   -Patient must remain partial weightbearing to the right heel in surgical shoe only for necessary activities    -Monitor for any signs of infection including redness, swelling in the leg above the bandage, nausea/vomiting/fever/chills/chest pain/shortness of breath, if any are present patient must report to the emergency department immediately  -Patient is to follow up with Dr. Staton/Dr. Paz/ Dr. Dobbs  within 5 days after discharge at [facility] [place]. Please call to make an appointment [phone].       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  PICC line has been  Please complete the IV antibiotics to completion as directed  CBC, CMP and CRP weekly  Wound Care Instructions:  -Keep dressing clean, dry, and intact to the left  foot  -Change dressing to the left foot every other day  - Apply silver alginate and cover with sterile gauze, abd pad and neil   -Patient must remain partial weightbearing to the right heel in surgical shoe only for necessary activities    -Monitor for any signs of infection including redness, swelling in the leg above the bandage, nausea/vomiting/fever/chills/chest pain/shortness of breath, if any are present patient must report to the emergency department immediately  -Patient is to follow up with Dr. Staton/Dr. Paz/ Dr. oDbbs  within 5 days after discharge at [facility] [place]. Please call to make an appointment [phone].       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  PICC line has been  Please complete the IV antibiotics to completion as directed  CBC, CMP and CRP weekly  Wound Care Instructions:  -Keep dressing clean, dry, and intact to the left  foot  -Change dressing to the left foot every other day  - Apply silver alginate and cover with sterile gauze, abd pad and neil   -Patient must remain partial weightbearing to the right heel in surgical shoe only for necessary activities    -Monitor for any signs of infection including redness, swelling in the leg above the bandage, nausea/vomiting/fever/chills/chest pain/shortness of breath, if any are present patient must report to the emergency department immediately  -Patient is to follow up with Dr. Staton/Dr. Paz/ Dr. Dobbs  within 5 days after discharge at [facility] [place]. Please call to make an appointment [phone].

## 2023-12-08 NOTE — DIETITIAN INITIAL EVALUATION ADULT - ORAL INTAKE PTA/DIET HISTORY
Pt reports good appetite/intake PTA. Lives at home with wife and daughter. Typically consumes 3 meals/day. Pt prepares his own meals. Pt does not consume soda or juice. Does not eat sweets. Watches carbohydrate intake. Follows an ovo vegetarian diet.

## 2023-12-08 NOTE — DISCHARGE NOTE NURSING/CASE MANAGEMENT/SOCIAL WORK - NSDCDMENAME_GEN_ALL_CORE_FT
[facility] [phone]  [facility] [phone]

## 2023-12-09 LAB
CULTURE RESULTS: SIGNIFICANT CHANGE UP
SPECIMEN SOURCE: SIGNIFICANT CHANGE UP

## 2023-12-11 DIAGNOSIS — Z79.84 LONG TERM (CURRENT) USE OF ORAL HYPOGLYCEMIC DRUGS: ICD-10-CM

## 2023-12-11 DIAGNOSIS — E11.40 TYPE 2 DIABETES MELLITUS WITH DIABETIC NEUROPATHY, UNSPECIFIED: ICD-10-CM

## 2023-12-11 DIAGNOSIS — B95.5 UNSPECIFIED STREPTOCOCCUS AS THE CAUSE OF DISEASES CLASSIFIED ELSEWHERE: ICD-10-CM

## 2023-12-11 DIAGNOSIS — L97.524 NON-PRESSURE CHRONIC ULCER OF OTHER PART OF LEFT FOOT WITH NECROSIS OF BONE: ICD-10-CM

## 2023-12-11 DIAGNOSIS — E03.9 HYPOTHYROIDISM, UNSPECIFIED: ICD-10-CM

## 2023-12-11 DIAGNOSIS — L84 CORNS AND CALLOSITIES: ICD-10-CM

## 2023-12-11 DIAGNOSIS — I10 ESSENTIAL (PRIMARY) HYPERTENSION: ICD-10-CM

## 2023-12-11 DIAGNOSIS — Z79.82 LONG TERM (CURRENT) USE OF ASPIRIN: ICD-10-CM

## 2023-12-11 DIAGNOSIS — Z79.890 HORMONE REPLACEMENT THERAPY: ICD-10-CM

## 2023-12-11 DIAGNOSIS — E55.9 VITAMIN D DEFICIENCY, UNSPECIFIED: ICD-10-CM

## 2023-12-11 DIAGNOSIS — R22.42 LOCALIZED SWELLING, MASS AND LUMP, LEFT LOWER LIMB: ICD-10-CM

## 2023-12-11 DIAGNOSIS — E78.5 HYPERLIPIDEMIA, UNSPECIFIED: ICD-10-CM

## 2023-12-11 DIAGNOSIS — B96.1 KLEBSIELLA PNEUMONIAE [K. PNEUMONIAE] AS THE CAUSE OF DISEASES CLASSIFIED ELSEWHERE: ICD-10-CM

## 2023-12-11 DIAGNOSIS — Z79.899 OTHER LONG TERM (CURRENT) DRUG THERAPY: ICD-10-CM

## 2023-12-11 DIAGNOSIS — E11.621 TYPE 2 DIABETES MELLITUS WITH FOOT ULCER: ICD-10-CM

## 2023-12-11 DIAGNOSIS — M86.172 OTHER ACUTE OSTEOMYELITIS, LEFT ANKLE AND FOOT: ICD-10-CM

## 2023-12-13 ENCOUNTER — OUTPATIENT (OUTPATIENT)
Dept: OUTPATIENT SERVICES | Facility: HOSPITAL | Age: 73
LOS: 1 days | Discharge: HOME | End: 2023-12-13
Payer: COMMERCIAL

## 2023-12-13 ENCOUNTER — APPOINTMENT (OUTPATIENT)
Dept: WOUND CARE | Facility: HOSPITAL | Age: 73
End: 2023-12-13
Payer: COMMERCIAL

## 2023-12-13 VITALS
WEIGHT: 178 LBS | BODY MASS INDEX: 25.48 KG/M2 | RESPIRATION RATE: 18 BRPM | SYSTOLIC BLOOD PRESSURE: 162 MMHG | OXYGEN SATURATION: 99 % | TEMPERATURE: 98.4 F | HEIGHT: 70 IN | DIASTOLIC BLOOD PRESSURE: 81 MMHG | HEART RATE: 55 BPM

## 2023-12-13 DIAGNOSIS — E11.621 TYPE 2 DIABETES MELLITUS WITH FOOT ULCER: ICD-10-CM

## 2023-12-13 DIAGNOSIS — L97.524 NON-PRESSURE CHRONIC ULCER OF OTHER PART OF LEFT FOOT WITH NECROSIS OF BONE: ICD-10-CM

## 2023-12-13 PROCEDURE — G0463: CPT

## 2023-12-13 PROCEDURE — 99024 POSTOP FOLLOW-UP VISIT: CPT

## 2023-12-15 ENCOUNTER — APPOINTMENT (OUTPATIENT)
Dept: WOUND CARE | Facility: HOSPITAL | Age: 73
End: 2023-12-15
Payer: COMMERCIAL

## 2023-12-15 ENCOUNTER — OUTPATIENT (OUTPATIENT)
Dept: OUTPATIENT SERVICES | Facility: HOSPITAL | Age: 73
LOS: 1 days | Discharge: HOME | End: 2023-12-15
Payer: COMMERCIAL

## 2023-12-15 VITALS
OXYGEN SATURATION: 99 % | BODY MASS INDEX: 25.48 KG/M2 | SYSTOLIC BLOOD PRESSURE: 147 MMHG | TEMPERATURE: 98.4 F | RESPIRATION RATE: 18 BRPM | WEIGHT: 178 LBS | DIASTOLIC BLOOD PRESSURE: 69 MMHG | HEART RATE: 59 BPM | HEIGHT: 70 IN

## 2023-12-15 DIAGNOSIS — E03.9 HYPOTHYROIDISM, UNSPECIFIED: ICD-10-CM

## 2023-12-15 DIAGNOSIS — E11.40 TYPE 2 DIABETES MELLITUS WITH DIABETIC NEUROPATHY, UNSPECIFIED: ICD-10-CM
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
## 2023-12-15 DIAGNOSIS — M86.672 OTHER CHRONIC OSTEOMYELITIS, LEFT ANKLE AND FOOT: ICD-10-CM

## 2023-12-15 DIAGNOSIS — E11.621 TYPE 2 DIABETES MELLITUS WITH FOOT ULCER: ICD-10-CM

## 2023-12-15 DIAGNOSIS — T86.828 OTHER COMPLICATIONS OF SKIN GRAFT (ALLOGRAFT) (AUTOGRAFT): ICD-10-CM

## 2023-12-15 DIAGNOSIS — E78.5 HYPERLIPIDEMIA, UNSPECIFIED: ICD-10-CM

## 2023-12-15 DIAGNOSIS — L84 CORNS AND CALLOSITIES: ICD-10-CM

## 2023-12-15 DIAGNOSIS — I10 ESSENTIAL (PRIMARY) HYPERTENSION: ICD-10-CM

## 2023-12-15 DIAGNOSIS — Z79.890 HORMONE REPLACEMENT THERAPY: ICD-10-CM
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
## 2023-12-15 DIAGNOSIS — Z79.82 LONG TERM (CURRENT) USE OF ASPIRIN: ICD-10-CM

## 2023-12-15 DIAGNOSIS — E55.9 VITAMIN D DEFICIENCY, UNSPECIFIED: ICD-10-CM

## 2023-12-15 DIAGNOSIS — E11.69 TYPE 2 DIABETES MELLITUS WITH OTHER SPECIFIED COMPLICATION: ICD-10-CM

## 2023-12-15 DIAGNOSIS — Z79.84 LONG TERM (CURRENT) USE OF ORAL HYPOGLYCEMIC DRUGS: ICD-10-CM

## 2023-12-15 DIAGNOSIS — Z79.899 OTHER LONG TERM (CURRENT) DRUG THERAPY: ICD-10-CM

## 2023-12-15 PROBLEM — L97.524 CHRONIC ULCER OF LEFT FOOT WITH NECROSIS OF BONE: Status: ACTIVE | Noted: 2023-12-04

## 2023-12-15 PROCEDURE — G0463: CPT

## 2023-12-15 PROCEDURE — ZZZZZ: CPT

## 2023-12-15 NOTE — PHYSICAL EXAM
[4 x 4] : 4 x 4  [0] : left 0 [1+] : left 1+ [Ankle Swelling (On Exam)] : present [Ankle Swelling Bilaterally] : bilaterally  [Varicose Veins Of Lower Extremities] : bilaterally [Ankle Swelling On The Right] : mild [Skin Ulcer] : ulcer [Alert] : alert [Oriented to Person] : oriented to person [Oriented to Place] : oriented to place [Oriented to Time] : oriented to time [Calm] : calm [] : not present [Stool Sample Taken] : No stool obtained  on rectal exam [Purpura] : no purpura  [Petechiae] : no petechiae [Skin Induration] : no induration [de-identified] : A&Ox3, NAD [de-identified] : HTN, HLD [de-identified] : 5 out of 5 strength in all quadrants bilaterally, s/p left foot 4th digit amputation and 4th metatarsal head resection  [de-identified] : Left foot plantar first metatarsal head , clean , granular; LEft foot 4th digi [de-identified] : Loss of light and sensation bilaterally [FreeTextEntry1] : 4th digit resection- Sutures in place [FreeTextEntry2] : 8.0 [FreeTextEntry3] : 0.2 [FreeTextEntry4] : 0.1 [de-identified] : Serosanguinous [de-identified] : none [de-identified] : pale pink tissue on wound base [de-identified] : Silver Alginate [de-identified] :  Mechanically cleansed with sterile gauze and normal saline 0.9% Kerlix Wrap  [FreeTextEntry7] : Honey Cardenas [FreeTextEntry8] : 0.2 [FreeTextEntry9] : 0.3 [de-identified] : 0.1 [de-identified] : Calloused [de-identified] : Dry Gauze [de-identified] :  Mechanically cleansed with sterile gauze and normal saline 0.9% Kerlix Wrap  [TWNoteComboBox1] : Left [TWNoteComboBox5] : No [TWNoteComboBox4] : Moderate [TWNoteComboBox6] : Surgical [de-identified] : No [de-identified] : Macerated [de-identified] : Mild [de-identified] : None [de-identified] : 100% [de-identified] : 3x Weekly [de-identified] : Primary Dressing [TWNoteComboBox9] : Left [de-identified] : No [de-identified] : Diabetic [de-identified] : No [de-identified] : None [de-identified] : None [de-identified] : 3x Weekly [de-identified] : Primary Dressing

## 2023-12-15 NOTE — PLAN
[FreeTextEntry1] : .Patient seen and evaluated. Discussed etiology and treatment plan. Patient verbalized understanding. Dehiscence noted to the distal surgical site down to bone. Patient will benefit from HBOT to aid with the healing process for the diabetic ulcer. Start local wound care and offloading. Patient is high risk for sepsis, loss of limb and loss of life.  Spent 30 minutes for patient care and medical decision making.

## 2023-12-15 NOTE — REVIEW OF SYSTEMS
[Arthralgias] : arthralgias [Joint Stiffness] : joint stiffness [Skin Wound] : skin wound [Negative] : Heme/Lymph [Fever] : no fever [Chills] : no chills [Eye Pain] : no eye pain [Earache] : no earache [Chest Pain] : no chest pain [Shortness Of Breath] : no shortness of breath [Wheezing] : no wheezing [Abdominal Pain] : no abdominal pain [Anxiety] : no anxiety [FreeTextEntry5] : HTN, HLD [FreeTextEntry9] : Swollen left foot  [de-identified] : DFU 2 plantar left 1st metatarsal head , clean , , granular - soi ; s/p left foot 4th digit ampuation and 4th metatarsal head resection with dehiscence down to bone   [de-identified] : NIDDM with neuropathy  [de-identified] : NIDDM

## 2023-12-15 NOTE — HISTORY OF PRESENT ILLNESS
[FreeTextEntry1] : Patient is 73 year old male presenting s/p left foot 4th digit and 4th metatarsal head resection for osteomyelitis. Patient is accompanied by his spouse. Patient's pathology is (+) for OM and is currently on PICC line abx. Patient was recently d/c from [facility]

## 2023-12-15 NOTE — ASSESSMENT
[Verbal] : Verbal [Demo] : Demo [Patient] : Patient [Good - alert, interested, motivated] : Good - alert, interested, motivated [Verbalizes knowledge/Understanding] : Verbalizes knowledge/understanding [Dressing changes] : dressing changes [Foot Care] : foot care [Skin Care] : skin care [Signs and symptoms of infection] : sign and symptoms of infection [Surgery] : surgery [Nutrition] : nutrition [How and When to Call] : how and when to call [Labs and Tests] : labs and tests [Off-loading] : off-loading [Hospitalization] : hospitalization [Patient responsibility to plan of care] : patient responsibility to plan of care [Glycemic Control] : glycemic control [Stable] : stable [Home] : Home [Ambulatory] : Ambulatory [Not Applicable - Long Term Care/Home Health Agency] : Long Term Care/Home Health Agency: Not Applicable [] : No [FreeTextEntry2] : Infection prevention- re-educated patient on when he should seek medical attention in the future,  Maintain optimal skin integrity to high pressure areas Nutrition and wound healing Elevation and low sodium compliance. Pressure relief/ Pressure redistribution Offloading the stress on skin structures and decreasing potential pathologic biomechanical influences.  [FreeTextEntry3] : Incision has some dihicense, odor and drainage. [FreeTextEntry4] : Patient advised they would benefit from HBO Therapy, Orientation and TM check completed. Authorization submitted. CXR and Labs already completed at recent hospital stay. DPM reviewed.  Patient is not able to perform own dressing changes, Patient to return Fri for dressing change. .

## 2023-12-18 ENCOUNTER — APPOINTMENT (OUTPATIENT)
Dept: WOUND CARE | Facility: HOSPITAL | Age: 73
End: 2023-12-18
Payer: COMMERCIAL

## 2023-12-18 ENCOUNTER — OUTPATIENT (OUTPATIENT)
Dept: OUTPATIENT SERVICES | Facility: HOSPITAL | Age: 73
LOS: 1 days | Discharge: HOME | End: 2023-12-18
Payer: COMMERCIAL

## 2023-12-18 VITALS
DIASTOLIC BLOOD PRESSURE: 76 MMHG | SYSTOLIC BLOOD PRESSURE: 174 MMHG | HEIGHT: 70 IN | WEIGHT: 178 LBS | TEMPERATURE: 98.7 F | HEART RATE: 51 BPM | BODY MASS INDEX: 25.48 KG/M2 | RESPIRATION RATE: 18 BRPM | OXYGEN SATURATION: 98 %
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
## 2023-12-18 DIAGNOSIS — Z89.422 ACQUIRED ABSENCE OF OTHER LEFT TOE(S): ICD-10-CM
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
## 2023-12-18 DIAGNOSIS — M86.671 OTHER CHRONIC OSTEOMYELITIS, RIGHT ANKLE AND FOOT: ICD-10-CM

## 2023-12-18 DIAGNOSIS — T86.828 OTHER COMPLICATIONS OF SKIN GRAFT (ALLOGRAFT) (AUTOGRAFT): ICD-10-CM

## 2023-12-18 DIAGNOSIS — E11.69 TYPE 2 DIABETES MELLITUS WITH OTHER SPECIFIED COMPLICATION: ICD-10-CM

## 2023-12-18 DIAGNOSIS — E11.621 TYPE 2 DIABETES MELLITUS WITH FOOT ULCER: ICD-10-CM

## 2023-12-18 PROCEDURE — G0463: CPT

## 2023-12-18 PROCEDURE — ZZZZZ: CPT

## 2023-12-20 ENCOUNTER — APPOINTMENT (OUTPATIENT)
Dept: WOUND CARE | Facility: HOSPITAL | Age: 73
End: 2023-12-20
Payer: COMMERCIAL

## 2023-12-20 ENCOUNTER — OUTPATIENT (OUTPATIENT)
Dept: OUTPATIENT SERVICES | Facility: HOSPITAL | Age: 73
LOS: 1 days | Discharge: HOME | End: 2023-12-20
Payer: COMMERCIAL

## 2023-12-20 VITALS
OXYGEN SATURATION: 98 % | HEART RATE: 50 BPM | DIASTOLIC BLOOD PRESSURE: 68 MMHG | TEMPERATURE: 98.1 F | RESPIRATION RATE: 15 BRPM | WEIGHT: 175 LBS | BODY MASS INDEX: 25.05 KG/M2 | SYSTOLIC BLOOD PRESSURE: 169 MMHG | HEIGHT: 70 IN

## 2023-12-20 DIAGNOSIS — T86.828 OTHER COMPLICATIONS OF SKIN GRAFT (ALLOGRAFT) (AUTOGRAFT): ICD-10-CM

## 2023-12-20 DIAGNOSIS — I96 OTHER COMPLICATIONS OF SKIN GRAFT (ALLOGRAFT) (AUTOGRAFT): ICD-10-CM

## 2023-12-20 DIAGNOSIS — E11.621 TYPE 2 DIABETES MELLITUS WITH FOOT ULCER: ICD-10-CM

## 2023-12-20 PROCEDURE — G0463: CPT

## 2023-12-20 PROCEDURE — 99024 POSTOP FOLLOW-UP VISIT: CPT

## 2023-12-20 NOTE — REVIEW OF SYSTEMS
[Fever] : no fever [Chills] : no chills [Eye Pain] : no eye pain [Earache] : no earache [Shortness Of Breath] : no shortness of breath [Wheezing] : no wheezing [Abdominal Pain] : no abdominal pain [Arthralgias] : arthralgias [Joint Stiffness] : joint stiffness [Skin Wound] : skin wound [Limb Weakness] : no limb weakness [Difficulty Walking] : no difficulty walking [Anxiety] : no anxiety [Negative] : Heme/Lymph [FreeTextEntry5] : HTN, HLD [FreeTextEntry9] : Swollen left foot  [de-identified] : s/p amp 4th toe and partial metatarsal  [de-identified] : NIDDM with neuropathy  [de-identified] : NIDDM

## 2023-12-20 NOTE — HISTORY OF PRESENT ILLNESS
[FreeTextEntry1] : s/p 4th metatarsal and toe amputation , central dehiscence , serous drainage , PT on IV picc line antibiotics , awaiting approval to start HBOT

## 2023-12-20 NOTE — PLAN
[FreeTextEntry1] : continue wound care , IV antibiotic and await start of HBOT Spent 20 minutes for patient care and medical decision making.

## 2023-12-20 NOTE — ASSESSMENT
[Verbal] : Verbal [Demo] : Demo [Patient] : Patient [Good - alert, interested, motivated] : Good - alert, interested, motivated [Verbalizes knowledge/Understanding] : Verbalizes knowledge/understanding [Dressing changes] : dressing changes [Foot Care] : foot care [Skin Care] : skin care [Signs and symptoms of infection] : sign and symptoms of infection [Surgery] : surgery [Nutrition] : nutrition [How and When to Call] : how and when to call [Labs and Tests] : labs and tests [Off-loading] : off-loading [Hospitalization] : hospitalization [Patient responsibility to plan of care] : patient responsibility to plan of care [Glycemic Control] : glycemic control [Stable] : stable [Home] : Home [Ambulatory] : Ambulatory [Not Applicable - Long Term Care/Home Health Agency] : Long Term Care/Home Health Agency: Not Applicable [] : No [FreeTextEntry2] : Infection prevention- re-educated patient on when he should seek medical attention in the future,  Maintain optimal skin integrity to high pressure areas Nutrition and wound healing Elevation and low sodium compliance. Pressure relief/ Pressure redistribution Offloading the stress on skin structures and decreasing potential pathologic biomechanical influences.  [FreeTextEntry4] : DPM wants to get the patient started with HBOT Therapy ASAP! Pt awaiting authorization. R.N. from prior visit has escalated via the proper chain of command.  DPM removed last few stitches & cleaned w/ betadine. Patient is not able to perform own dressing changes, Patient to F/U on Friday for dressing change. .

## 2023-12-20 NOTE — PHYSICAL EXAM
[4 x 4] : 4 x 4  [0] : left 0 [1+] : left 1+ [Ankle Swelling (On Exam)] : present [Ankle Swelling Bilaterally] : bilaterally  [Varicose Veins Of Lower Extremities] : bilaterally [Ankle Swelling On The Right] : mild [] : bilaterally [Stool Sample Taken] : No stool obtained  on rectal exam [Purpura] : no purpura  [Petechiae] : no petechiae [Skin Ulcer] : ulcer [Skin Induration] : no induration [Alert] : alert [Oriented to Person] : oriented to person [Oriented to Place] : oriented to place [Oriented to Time] : oriented to time [Calm] : calm [de-identified] : A&Ox3, NAD [de-identified] : HTN, HLD [de-identified] : 5 out of 5 strength in all quadrants bilaterally [de-identified] : Left foot plantar first metatarsal head closed, s/p amp 4th toe and partial 4th metatarsal , central dehiscence and serous drainage  [de-identified] : Loss of light and sensation bilaterally [FreeTextEntry1] : 4th digit resection- Sutures in place [FreeTextEntry2] : 8.0 [FreeTextEntry3] : 0.2 [FreeTextEntry4] : 0.1 [de-identified] : Serosanguinous [de-identified] : none [de-identified] : pale white tissue on wound base [de-identified] : Silver Alginate [de-identified] :  Mechanically cleansed with sterile gauze and normal saline 0.9% Kerlix Wrap  [FreeTextEntry7] : Honey Cardenas [FreeTextEntry8] : 0.2 [FreeTextEntry9] : 0.3 [de-identified] : 0.1 [de-identified] : Calloused [de-identified] : Dry Gauze [de-identified] :  Mechanically cleansed with sterile gauze and normal saline 0.9% Kerlix Wrap  [TWNoteComboBox1] : Left [TWNoteComboBox4] : Moderate [TWNoteComboBox5] : No [TWNoteComboBox6] : Surgical [de-identified] : No [de-identified] : Macerated [de-identified] : Mild [de-identified] : None [de-identified] : 100% [de-identified] : 3x Weekly [de-identified] : Primary Dressing [TWNoteComboBox9] : Left [de-identified] : No [de-identified] : Diabetic [de-identified] : No [de-identified] : None [de-identified] : None [de-identified] : 3x Weekly [de-identified] : Primary Dressing

## 2023-12-21 ENCOUNTER — OUTPATIENT (OUTPATIENT)
Dept: OUTPATIENT SERVICES | Facility: HOSPITAL | Age: 73
LOS: 1 days | End: 2023-12-21
Payer: COMMERCIAL

## 2023-12-21 DIAGNOSIS — E11.69 TYPE 2 DIABETES MELLITUS WITH OTHER SPECIFIED COMPLICATION: ICD-10-CM

## 2023-12-21 DIAGNOSIS — T86.828 OTHER COMPLICATIONS OF SKIN GRAFT (ALLOGRAFT) (AUTOGRAFT): ICD-10-CM

## 2023-12-21 DIAGNOSIS — Z79.82 LONG TERM (CURRENT) USE OF ASPIRIN: ICD-10-CM

## 2023-12-21 DIAGNOSIS — E78.5 HYPERLIPIDEMIA, UNSPECIFIED: ICD-10-CM

## 2023-12-21 DIAGNOSIS — M86.672 OTHER CHRONIC OSTEOMYELITIS, LEFT ANKLE AND FOOT: ICD-10-CM

## 2023-12-21 DIAGNOSIS — I10 ESSENTIAL (PRIMARY) HYPERTENSION: ICD-10-CM

## 2023-12-21 DIAGNOSIS — Z89.422 ACQUIRED ABSENCE OF OTHER LEFT TOE(S): ICD-10-CM

## 2023-12-21 DIAGNOSIS — E55.9 VITAMIN D DEFICIENCY, UNSPECIFIED: ICD-10-CM

## 2023-12-21 DIAGNOSIS — Z79.890 HORMONE REPLACEMENT THERAPY: ICD-10-CM

## 2023-12-21 DIAGNOSIS — Z79.84 LONG TERM (CURRENT) USE OF ORAL HYPOGLYCEMIC DRUGS: ICD-10-CM

## 2023-12-21 DIAGNOSIS — Z79.899 OTHER LONG TERM (CURRENT) DRUG THERAPY: ICD-10-CM

## 2023-12-21 DIAGNOSIS — E11.40 TYPE 2 DIABETES MELLITUS WITH DIABETIC NEUROPATHY, UNSPECIFIED: ICD-10-CM

## 2023-12-21 DIAGNOSIS — L84 CORNS AND CALLOSITIES: ICD-10-CM

## 2023-12-21 DIAGNOSIS — E11.621 TYPE 2 DIABETES MELLITUS WITH FOOT ULCER: ICD-10-CM
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
## 2023-12-21 DIAGNOSIS — E03.9 HYPOTHYROIDISM, UNSPECIFIED: ICD-10-CM
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
## 2023-12-21 PROCEDURE — 93923 UPR/LXTR ART STDY 3+ LVLS: CPT

## 2023-12-21 PROCEDURE — 93923 UPR/LXTR ART STDY 3+ LVLS: CPT | Mod: 26

## 2023-12-22 ENCOUNTER — OUTPATIENT (OUTPATIENT)
Dept: OUTPATIENT SERVICES | Facility: HOSPITAL | Age: 73
LOS: 1 days | Discharge: HOME | End: 2023-12-22
Payer: COMMERCIAL

## 2023-12-22 ENCOUNTER — APPOINTMENT (OUTPATIENT)
Dept: WOUND CARE | Facility: HOSPITAL | Age: 73
End: 2023-12-22
Payer: COMMERCIAL

## 2023-12-22 VITALS
BODY MASS INDEX: 25.05 KG/M2 | HEART RATE: 49 BPM | HEIGHT: 70 IN | SYSTOLIC BLOOD PRESSURE: 180 MMHG | WEIGHT: 175 LBS | DIASTOLIC BLOOD PRESSURE: 73 MMHG | RESPIRATION RATE: 18 BRPM | TEMPERATURE: 97.7 F | OXYGEN SATURATION: 98 %

## 2023-12-22 DIAGNOSIS — E11.621 TYPE 2 DIABETES MELLITUS WITH FOOT ULCER: ICD-10-CM

## 2023-12-22 PROCEDURE — ZZZZZ: CPT

## 2023-12-22 PROCEDURE — G0463: CPT

## 2023-12-23 DIAGNOSIS — E11.69 TYPE 2 DIABETES MELLITUS WITH OTHER SPECIFIED COMPLICATION: ICD-10-CM

## 2023-12-23 DIAGNOSIS — L97.422 NON-PRESSURE CHRONIC ULCER OF LEFT HEEL AND MIDFOOT WITH FAT LAYER EXPOSED: ICD-10-CM

## 2023-12-23 DIAGNOSIS — M86.672 OTHER CHRONIC OSTEOMYELITIS, LEFT ANKLE AND FOOT: ICD-10-CM

## 2023-12-23 DIAGNOSIS — T86.828 OTHER COMPLICATIONS OF SKIN GRAFT (ALLOGRAFT) (AUTOGRAFT): ICD-10-CM
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
## 2023-12-26 ENCOUNTER — OUTPATIENT (OUTPATIENT)
Dept: OUTPATIENT SERVICES | Facility: HOSPITAL | Age: 73
LOS: 1 days | Discharge: HOME | End: 2023-12-26
Payer: COMMERCIAL

## 2023-12-26 ENCOUNTER — APPOINTMENT (OUTPATIENT)
Dept: WOUND CARE | Facility: HOSPITAL | Age: 73
End: 2023-12-26
Payer: COMMERCIAL

## 2023-12-26 VITALS — DIASTOLIC BLOOD PRESSURE: 62 MMHG | SYSTOLIC BLOOD PRESSURE: 203 MMHG

## 2023-12-26 VITALS
SYSTOLIC BLOOD PRESSURE: 191 MMHG | DIASTOLIC BLOOD PRESSURE: 72 MMHG | OXYGEN SATURATION: 98 % | TEMPERATURE: 98 F | HEART RATE: 52 BPM | RESPIRATION RATE: 18 BRPM

## 2023-12-26 DIAGNOSIS — E11.621 TYPE 2 DIABETES MELLITUS WITH FOOT ULCER: ICD-10-CM

## 2023-12-26 PROCEDURE — 99024 POSTOP FOLLOW-UP VISIT: CPT

## 2023-12-26 PROCEDURE — G0463: CPT

## 2023-12-27 DIAGNOSIS — E03.9 HYPOTHYROIDISM, UNSPECIFIED: ICD-10-CM

## 2023-12-27 DIAGNOSIS — Z79.82 LONG TERM (CURRENT) USE OF ASPIRIN: ICD-10-CM

## 2023-12-27 DIAGNOSIS — E78.5 HYPERLIPIDEMIA, UNSPECIFIED: ICD-10-CM

## 2023-12-27 DIAGNOSIS — E11.40 TYPE 2 DIABETES MELLITUS WITH DIABETIC NEUROPATHY, UNSPECIFIED: ICD-10-CM

## 2023-12-27 DIAGNOSIS — Z89.422 ACQUIRED ABSENCE OF OTHER LEFT TOE(S): ICD-10-CM

## 2023-12-27 DIAGNOSIS — E11.69 TYPE 2 DIABETES MELLITUS WITH OTHER SPECIFIED COMPLICATION: ICD-10-CM

## 2023-12-27 DIAGNOSIS — M86.672 OTHER CHRONIC OSTEOMYELITIS, LEFT ANKLE AND FOOT: ICD-10-CM

## 2023-12-27 DIAGNOSIS — E55.9 VITAMIN D DEFICIENCY, UNSPECIFIED: ICD-10-CM

## 2023-12-27 DIAGNOSIS — Z79.899 OTHER LONG TERM (CURRENT) DRUG THERAPY: ICD-10-CM

## 2023-12-27 DIAGNOSIS — I10 ESSENTIAL (PRIMARY) HYPERTENSION: ICD-10-CM

## 2023-12-27 DIAGNOSIS — Z79.890 HORMONE REPLACEMENT THERAPY: ICD-10-CM

## 2023-12-27 DIAGNOSIS — Z79.84 LONG TERM (CURRENT) USE OF ORAL HYPOGLYCEMIC DRUGS: ICD-10-CM

## 2023-12-27 NOTE — PHYSICAL EXAM
[4 x 4] : 4 x 4  [0] : left 0 [1+] : left 1+ [Ankle Swelling (On Exam)] : present [Ankle Swelling Bilaterally] : bilaterally  [Varicose Veins Of Lower Extremities] : bilaterally [Ankle Swelling On The Right] : mild [Skin Ulcer] : ulcer [Alert] : alert [Oriented to Person] : oriented to person [Oriented to Place] : oriented to place [Oriented to Time] : oriented to time [Calm] : calm [] : not present [Stool Sample Taken] : No stool obtained  on rectal exam [Purpura] : no purpura  [Petechiae] : no petechiae [Skin Induration] : no induration [de-identified] : A&Ox3, NAD [de-identified] : HTN, HLD [de-identified] : 5 out of 5 strength in all quadrants bilaterally [de-identified] : Left foot plantar first metatarsal head closed, s/p amp 4th toe and partial 4th metatarsal , central dehiscence and serous drainage  [de-identified] : Loss of light and sensation bilaterally [de-identified] :  central dehiscence [FreeTextEntry1] : Foot, S/P 4th metatarsal and toe amputation [FreeTextEntry2] : 1.7 [FreeTextEntry3] : 0.2 [FreeTextEntry4] : 0.1 [de-identified] : Serosanguinous [de-identified] : none [de-identified] : Alginate Ag [de-identified] : Mechanically cleansed with sterile gauze and normal saline 0.9% Dry Dressing [FreeTextEntry7] : Plantar Hallux: CLOSED [de-identified] : Calloused [de-identified] : Dry Gauze [de-identified] :  Mechanically cleansed with sterile gauze and normal saline 0.9% Kerlix Wrap  [TWNoteComboBox1] : Left [TWNoteComboBox4] : Small [TWNoteComboBox5] : No [TWNoteComboBox6] : Surgical [de-identified] : No [de-identified] : Macerated [de-identified] : Mild [de-identified] : None [de-identified] : 100% [de-identified] : 3x Weekly [de-identified] : Primary Dressing [TWNoteComboBox9] : Left [de-identified] : No [de-identified] : Diabetic [de-identified] : No [de-identified] : None [de-identified] : None [de-identified] : 3x Weekly [de-identified] : Primary Dressing

## 2023-12-27 NOTE — ASSESSMENT
[Verbal] : Verbal [Demo] : Demo [Patient] : Patient [Good - alert, interested, motivated] : Good - alert, interested, motivated [Verbalizes knowledge/Understanding] : Verbalizes knowledge/understanding [Dressing changes] : dressing changes [Foot Care] : foot care [Skin Care] : skin care [Signs and symptoms of infection] : sign and symptoms of infection [Surgery] : surgery [Nutrition] : nutrition [How and When to Call] : how and when to call [Labs and Tests] : labs and tests [Off-loading] : off-loading [Hospitalization] : hospitalization [Patient responsibility to plan of care] : patient responsibility to plan of care [Glycemic Control] : glycemic control [Stable] : stable [Home] : Home [Ambulatory] : Ambulatory [Not Applicable - Long Term Care/Home Health Agency] : Long Term Care/Home Health Agency: Not Applicable [] : No [FreeTextEntry2] : Infection prevention  Wound care (dressing changes) Nutrition and wound healing Offloading the stress on skin structures and decreasing potential pathologic biomechanical influences. PT will maintain BP within individually acceptable range. HBOT [FreeTextEntry4] : Patient tolerating I.V. Abt therapy well. To date, patient has not been approved for HBOT.  Photos to be obtained at next visit. Patient to return Saturday, 12/30/23 for dressing change.  Assessment in 1 week

## 2023-12-27 NOTE — PLAN
[FreeTextEntry1] : continue wound care , IV antibiotic and patient to start HBOT.  Patient remains very high risk for infection, sepsis, limb loss, and death.  Spent 20 minutes for patient care and medical decision making.

## 2023-12-27 NOTE — REVIEW OF SYSTEMS
[Arthralgias] : arthralgias [Joint Stiffness] : joint stiffness [Skin Wound] : skin wound [Negative] : Heme/Lymph [Fever] : no fever [Chills] : no chills [Eye Pain] : no eye pain [Earache] : no earache [Shortness Of Breath] : no shortness of breath [Wheezing] : no wheezing [Abdominal Pain] : no abdominal pain [Limb Weakness] : no limb weakness [Difficulty Walking] : no difficulty walking [Anxiety] : no anxiety [FreeTextEntry5] : HTN, HLD [FreeTextEntry9] : Swollen left foot  [de-identified] : s/p amp 4th toe and partial metatarsal  [de-identified] : NIDDM with neuropathy  [de-identified] : NIDDM

## 2023-12-27 NOTE — HISTORY OF PRESENT ILLNESS
[FreeTextEntry1] : s/p 4th metatarsal and toe amputation , central dehiscence , serous drainage , PT on IV picc line antibiotics , patient to begin hyperbaric oxygen therapy.

## 2023-12-30 ENCOUNTER — OUTPATIENT (OUTPATIENT)
Dept: OUTPATIENT SERVICES | Facility: HOSPITAL | Age: 73
LOS: 1 days | Discharge: HOME | End: 2023-12-30
Payer: COMMERCIAL

## 2023-12-30 ENCOUNTER — APPOINTMENT (OUTPATIENT)
Dept: WOUND CARE | Facility: HOSPITAL | Age: 73
End: 2023-12-30
Payer: COMMERCIAL

## 2023-12-30 VITALS
HEIGHT: 70 IN | RESPIRATION RATE: 18 BRPM | TEMPERATURE: 98.2 F | HEART RATE: 54 BPM | BODY MASS INDEX: 25.05 KG/M2 | DIASTOLIC BLOOD PRESSURE: 87 MMHG | OXYGEN SATURATION: 99 % | WEIGHT: 175 LBS | SYSTOLIC BLOOD PRESSURE: 166 MMHG

## 2023-12-30 DIAGNOSIS — E11.621 TYPE 2 DIABETES MELLITUS WITH FOOT ULCER: ICD-10-CM

## 2023-12-30 PROCEDURE — G0463: CPT

## 2023-12-30 PROCEDURE — ZZZZZ: CPT

## 2024-01-02 ENCOUNTER — OUTPATIENT (OUTPATIENT)
Dept: OUTPATIENT SERVICES | Facility: HOSPITAL | Age: 74
LOS: 1 days | Discharge: HOME | End: 2024-01-02
Payer: COMMERCIAL

## 2024-01-02 ENCOUNTER — APPOINTMENT (OUTPATIENT)
Dept: WOUND CARE | Facility: HOSPITAL | Age: 74
End: 2024-01-02
Payer: COMMERCIAL

## 2024-01-02 VITALS
RESPIRATION RATE: 18 BRPM | WEIGHT: 175 LBS | TEMPERATURE: 98 F | OXYGEN SATURATION: 99 % | HEART RATE: 51 BPM | HEIGHT: 70 IN | DIASTOLIC BLOOD PRESSURE: 63 MMHG | BODY MASS INDEX: 25.05 KG/M2 | SYSTOLIC BLOOD PRESSURE: 196 MMHG

## 2024-01-02 DIAGNOSIS — E11.69 TYPE 2 DIABETES MELLITUS WITH OTHER SPECIFIED COMPLICATION: ICD-10-CM

## 2024-01-02 DIAGNOSIS — Z79.84 LONG TERM (CURRENT) USE OF ORAL HYPOGLYCEMIC DRUGS: ICD-10-CM

## 2024-01-02 DIAGNOSIS — Z79.899 OTHER LONG TERM (CURRENT) DRUG THERAPY: ICD-10-CM

## 2024-01-02 DIAGNOSIS — Z79.890 HORMONE REPLACEMENT THERAPY: ICD-10-CM

## 2024-01-02 DIAGNOSIS — M86.672 OTHER CHRONIC OSTEOMYELITIS, LEFT ANKLE AND FOOT: ICD-10-CM

## 2024-01-02 DIAGNOSIS — I10 ESSENTIAL (PRIMARY) HYPERTENSION: ICD-10-CM

## 2024-01-02 DIAGNOSIS — Z79.82 LONG TERM (CURRENT) USE OF ASPIRIN: ICD-10-CM
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
## 2024-01-02 DIAGNOSIS — E11.40 TYPE 2 DIABETES MELLITUS WITH DIABETIC NEUROPATHY, UNSPECIFIED: ICD-10-CM
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
## 2024-01-02 DIAGNOSIS — L84 CORNS AND CALLOSITIES: ICD-10-CM

## 2024-01-02 DIAGNOSIS — E03.9 HYPOTHYROIDISM, UNSPECIFIED: ICD-10-CM

## 2024-01-02 DIAGNOSIS — E78.5 HYPERLIPIDEMIA, UNSPECIFIED: ICD-10-CM

## 2024-01-02 DIAGNOSIS — E55.9 VITAMIN D DEFICIENCY, UNSPECIFIED: ICD-10-CM

## 2024-01-02 DIAGNOSIS — E11.621 TYPE 2 DIABETES MELLITUS WITH FOOT ULCER: ICD-10-CM

## 2024-01-02 PROCEDURE — G0463: CPT

## 2024-01-02 PROCEDURE — 99024 POSTOP FOLLOW-UP VISIT: CPT

## 2024-01-02 NOTE — PLAN
[FreeTextEntry1] : continue post op dressing changes , PTR 3 days Spent 20 minutes for patient care and medical decision making.

## 2024-01-02 NOTE — HISTORY OF PRESENT ILLNESS
[FreeTextEntry1] : s/p amp 4th toe and partial metatarsal left foot , slight dehiscence , some maceration , pt on IV antibiotics

## 2024-01-02 NOTE — REVIEW OF SYSTEMS
[Fever] : no fever [Chills] : no chills [Eye Pain] : no eye pain [Earache] : no earache [Shortness Of Breath] : no shortness of breath [Wheezing] : no wheezing [Abdominal Pain] : no abdominal pain [Arthralgias] : arthralgias [Joint Stiffness] : joint stiffness [Skin Wound] : skin wound [Limb Weakness] : no limb weakness [Difficulty Walking] : no difficulty walking [Anxiety] : no anxiety [Negative] : Heme/Lymph [FreeTextEntry5] : HTN, HLD [FreeTextEntry9] : Swollen left foot  [de-identified] : s/p amp 4th toe and partial metatarsal  [de-identified] : NIDDM with neuropathy  [de-identified] : NIDDM

## 2024-01-02 NOTE — PHYSICAL EXAM
[4 x 4] : 4 x 4  [0] : left 0 [1+] : left 1+ [Ankle Swelling (On Exam)] : present [Ankle Swelling Bilaterally] : bilaterally  [Varicose Veins Of Lower Extremities] : bilaterally [Ankle Swelling On The Right] : mild [] : bilaterally [Stool Sample Taken] : No stool obtained  on rectal exam [Purpura] : no purpura  [Petechiae] : no petechiae [Skin Ulcer] : ulcer [Skin Induration] : no induration [Alert] : alert [Oriented to Person] : oriented to person [Oriented to Place] : oriented to place [Oriented to Time] : oriented to time [Calm] : calm [de-identified] : HTN, HLD [de-identified] : A&Ox3, NAD [de-identified] : 5 out of 5 strength in all quadrants bilaterally [de-identified] : Left foot plantar first metatarsal head closed, s/p amp 4th toe and partial 4th metatarsal , central dehiscence and serous drainage  [de-identified] : Loss of light and sensation bilaterally [de-identified] :  central dehiscence [FreeTextEntry1] : Foot, S/P 4th metatarsal and toe amputation [FreeTextEntry2] : 1.7 [FreeTextEntry3] : 0.2 [FreeTextEntry4] : 0.1 [de-identified] : Serosanguinous [de-identified] : none [de-identified] : Alginate Ag [de-identified] : Mechanically cleansed with sterile gauze and normal saline 0.9% Dry Dressing Kerlix Stockingette [FreeTextEntry7] : Plantar Hallux: CLOSED [de-identified] : Calloused [de-identified] :  Mechanically cleansed with sterile gauze and normal saline 0.9% Kerlix Wrap  [TWNoteComboBox1] : Left [TWNoteComboBox4] : Small [TWNoteComboBox5] : No [TWNoteComboBox6] : Surgical [de-identified] : No [de-identified] : Mild [de-identified] : Macerated [de-identified] : None [de-identified] : 100% [de-identified] : Daily [de-identified] : Primary Dressing [TWNoteComboBox9] : Left [de-identified] : No [de-identified] : Diabetic [de-identified] : No [de-identified] : None [de-identified] : None

## 2024-01-02 NOTE — ASSESSMENT
[Verbal] : Verbal [Demo] : Demo [Patient] : Patient [Good - alert, interested, motivated] : Good - alert, interested, motivated [Verbalizes knowledge/Understanding] : Verbalizes knowledge/understanding [Dressing changes] : dressing changes [Foot Care] : foot care [Skin Care] : skin care [Signs and symptoms of infection] : sign and symptoms of infection [Surgery] : surgery [Nutrition] : nutrition [How and When to Call] : how and when to call [Labs and Tests] : labs and tests [Off-loading] : off-loading [Hospitalization] : hospitalization [Patient responsibility to plan of care] : patient responsibility to plan of care [Glycemic Control] : glycemic control [Stable] : stable [Home] : Home [Ambulatory] : Ambulatory [Not Applicable - Long Term Care/Home Health Agency] : Long Term Care/Home Health Agency: Not Applicable [] : No [FreeTextEntry2] : Infection prevention  Wound care (dressing changes) Nutrition and wound healing Offloading the stress on skin structures and decreasing potential pathologic biomechanical influences. PT will maintain BP within individually acceptable range. HBOT [FreeTextEntry3] : Skin between toes pale in color and becoming macerated because it's not being kept dry. [FreeTextEntry4] : DPM advised patient to change dressing every day because the skin is getting too moist in between the toes. DPM advised to use betadine today to dry out skin and apply silver alginate in between the toes and across the dorsal aspect of the foot. Pt comes here 3x/week for dressing changes and will check with his wife to see if she can change the dressing daily. Pt states his wife works nights and he comes here during the day which is why daily changes are hard - she's not home when he is. Pt states he cannot perform his own dressing changes. Pt states he has 2 weeks of IV abx left.  To date, patient has not been approved for HBOT.  Peer to Peer pending. F/U 1 week

## 2024-01-04 ENCOUNTER — APPOINTMENT (OUTPATIENT)
Dept: WOUND CARE | Facility: HOSPITAL | Age: 74
End: 2024-01-04
Payer: COMMERCIAL

## 2024-01-04 ENCOUNTER — OUTPATIENT (OUTPATIENT)
Dept: OUTPATIENT SERVICES | Facility: HOSPITAL | Age: 74
LOS: 1 days | Discharge: HOME | End: 2024-01-04
Payer: COMMERCIAL

## 2024-01-04 VITALS
WEIGHT: 175 LBS | SYSTOLIC BLOOD PRESSURE: 186 MMHG | RESPIRATION RATE: 18 BRPM | HEART RATE: 47 BPM | OXYGEN SATURATION: 100 % | DIASTOLIC BLOOD PRESSURE: 68 MMHG | BODY MASS INDEX: 25.05 KG/M2 | HEIGHT: 70 IN | TEMPERATURE: 98.3 F

## 2024-01-04 DIAGNOSIS — T87.81 DEHISCENCE OF AMPUTATION STUMP: ICD-10-CM
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
## 2024-01-04 DIAGNOSIS — E11.69 TYPE 2 DIABETES MELLITUS WITH OTHER SPECIFIED COMPLICATION: ICD-10-CM

## 2024-01-04 DIAGNOSIS — E11.621 TYPE 2 DIABETES MELLITUS WITH FOOT ULCER: ICD-10-CM
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
## 2024-01-04 DIAGNOSIS — Z89.422 ACQUIRED ABSENCE OF OTHER LEFT TOE(S): ICD-10-CM

## 2024-01-04 DIAGNOSIS — M86.672 OTHER CHRONIC OSTEOMYELITIS, LEFT ANKLE AND FOOT: ICD-10-CM

## 2024-01-04 PROCEDURE — G0463: CPT

## 2024-01-04 PROCEDURE — ZZZZZ: CPT

## 2024-01-06 ENCOUNTER — APPOINTMENT (OUTPATIENT)
Dept: WOUND CARE | Facility: HOSPITAL | Age: 74
End: 2024-01-06
Payer: COMMERCIAL

## 2024-01-06 ENCOUNTER — OUTPATIENT (OUTPATIENT)
Dept: OUTPATIENT SERVICES | Facility: HOSPITAL | Age: 74
LOS: 1 days | Discharge: HOME | End: 2024-01-06
Payer: COMMERCIAL

## 2024-01-06 VITALS
HEIGHT: 70 IN | HEART RATE: 48 BPM | DIASTOLIC BLOOD PRESSURE: 69 MMHG | OXYGEN SATURATION: 98 % | BODY MASS INDEX: 25.05 KG/M2 | SYSTOLIC BLOOD PRESSURE: 182 MMHG | WEIGHT: 175 LBS | RESPIRATION RATE: 18 BRPM | TEMPERATURE: 97.7 F
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
## 2024-01-06 DIAGNOSIS — E11.69 TYPE 2 DIABETES MELLITUS WITH OTHER SPECIFIED COMPLICATION: ICD-10-CM

## 2024-01-06 DIAGNOSIS — Z89.422 ACQUIRED ABSENCE OF OTHER LEFT TOE(S): ICD-10-CM
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
## 2024-01-06 DIAGNOSIS — M86.672 OTHER CHRONIC OSTEOMYELITIS, LEFT ANKLE AND FOOT: ICD-10-CM

## 2024-01-06 DIAGNOSIS — E11.621 TYPE 2 DIABETES MELLITUS WITH FOOT ULCER: ICD-10-CM

## 2024-01-06 DIAGNOSIS — T87.81 DEHISCENCE OF AMPUTATION STUMP: ICD-10-CM

## 2024-01-06 PROCEDURE — G0463: CPT

## 2024-01-06 PROCEDURE — ZZZZZ: CPT

## 2024-01-08 ENCOUNTER — APPOINTMENT (OUTPATIENT)
Dept: WOUND CARE | Facility: HOSPITAL | Age: 74
End: 2024-01-08
Payer: COMMERCIAL

## 2024-01-08 ENCOUNTER — OUTPATIENT (OUTPATIENT)
Dept: OUTPATIENT SERVICES | Facility: HOSPITAL | Age: 74
LOS: 1 days | Discharge: HOME | End: 2024-01-08
Payer: COMMERCIAL

## 2024-01-08 VITALS
WEIGHT: 175 LBS | DIASTOLIC BLOOD PRESSURE: 85 MMHG | HEIGHT: 70 IN | OXYGEN SATURATION: 98 % | TEMPERATURE: 98.2 F | HEART RATE: 49 BPM | SYSTOLIC BLOOD PRESSURE: 184 MMHG | BODY MASS INDEX: 25.05 KG/M2 | RESPIRATION RATE: 18 BRPM
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
## 2024-01-08 DIAGNOSIS — E11.69 TYPE 2 DIABETES MELLITUS WITH OTHER SPECIFIED COMPLICATION: ICD-10-CM

## 2024-01-08 DIAGNOSIS — M86.672 OTHER CHRONIC OSTEOMYELITIS, LEFT ANKLE AND FOOT: ICD-10-CM

## 2024-01-08 DIAGNOSIS — T87.81 DEHISCENCE OF AMPUTATION STUMP: ICD-10-CM

## 2024-01-08 DIAGNOSIS — Z89.422 ACQUIRED ABSENCE OF OTHER LEFT TOE(S): ICD-10-CM

## 2024-01-08 DIAGNOSIS — E11.621 TYPE 2 DIABETES MELLITUS WITH FOOT ULCER: ICD-10-CM
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
## 2024-01-08 PROCEDURE — ZZZZZ: CPT

## 2024-01-08 PROCEDURE — G0463: CPT

## 2024-01-10 ENCOUNTER — APPOINTMENT (OUTPATIENT)
Dept: WOUND CARE | Facility: HOSPITAL | Age: 74
End: 2024-01-10
Payer: COMMERCIAL

## 2024-01-10 ENCOUNTER — OUTPATIENT (OUTPATIENT)
Dept: OUTPATIENT SERVICES | Facility: HOSPITAL | Age: 74
LOS: 1 days | Discharge: HOME | End: 2024-01-10
Payer: COMMERCIAL

## 2024-01-10 VITALS
RESPIRATION RATE: 20 BRPM | HEIGHT: 70 IN | SYSTOLIC BLOOD PRESSURE: 175 MMHG | BODY MASS INDEX: 25.05 KG/M2 | HEART RATE: 45 BPM | TEMPERATURE: 97.8 F | WEIGHT: 175 LBS | OXYGEN SATURATION: 98 % | DIASTOLIC BLOOD PRESSURE: 73 MMHG

## 2024-01-10 DIAGNOSIS — E11.621 TYPE 2 DIABETES MELLITUS WITH FOOT ULCER: ICD-10-CM

## 2024-01-10 PROCEDURE — 99024 POSTOP FOLLOW-UP VISIT: CPT

## 2024-01-10 PROCEDURE — G0463: CPT

## 2024-01-11 NOTE — REVIEW OF SYSTEMS
[Fever] : no fever [Chills] : no chills [Eye Pain] : no eye pain [Earache] : no earache [Shortness Of Breath] : no shortness of breath [Wheezing] : no wheezing [Abdominal Pain] : no abdominal pain [Arthralgias] : arthralgias [Joint Stiffness] : joint stiffness [Skin Wound] : skin wound [Limb Weakness] : no limb weakness [Difficulty Walking] : no difficulty walking [Anxiety] : no anxiety [Negative] : Heme/Lymph [FreeTextEntry5] : HTN, HLD [FreeTextEntry9] : Swollen left foot  [de-identified] : s/p amp 4th toe and partial metatarsal  [de-identified] : NIDDM with neuropathy  [de-identified] : NIDDM

## 2024-01-11 NOTE — HISTORY OF PRESENT ILLNESS
[FreeTextEntry1] : Patient is 73 year old male presenting s/p left foot 4th digit and 4th metatarsal head resection for osteomyelitis with dehiscence to the central surgical site.  Patient is accompanied by his spouse. Patient's pathology is (+) for OM and is on PICC line abx .

## 2024-01-11 NOTE — PHYSICAL EXAM
[4 x 4] : 4 x 4  [0] : left 0 [1+] : left 1+ [Ankle Swelling (On Exam)] : present [Ankle Swelling Bilaterally] : bilaterally  [Varicose Veins Of Lower Extremities] : bilaterally [Ankle Swelling On The Right] : mild [] : not present [Stool Sample Taken] : No stool obtained  on rectal exam [Purpura] : no purpura  [Petechiae] : no petechiae [Skin Ulcer] : ulcer [Skin Induration] : no induration [Alert] : alert [Oriented to Person] : oriented to person [Oriented to Place] : oriented to place [Oriented to Time] : oriented to time [Calm] : calm [de-identified] : A&Ox3, NAD [de-identified] : HTN, HLD [de-identified] : 5 out of 5 strength in all quadrants bilaterally [de-identified] : Left foot plantar first metatarsal head closed, s/p amp 4th toe and partial 4th metatarsal , central dehiscence and serous drainage  [de-identified] : Loss of light and sensation bilaterally [de-identified] :  central dehiscence [FreeTextEntry1] : Foot, S/P 4th metatarsal and toe amputation [FreeTextEntry2] : 1.7 [FreeTextEntry3] : 0.2 [FreeTextEntry4] : 0.3 [de-identified] : Serosanguinous [de-identified] : none [de-identified] : calloused [de-identified] : Alginate Ag [de-identified] : Mechanically cleansed with sterile gauze and normal saline 0.9% Dry Dressing Kerlix Stockingette [FreeTextEntry7] : Plantar Hallux: CLOSED [de-identified] : Calloused [de-identified] :  Mechanically cleansed with sterile gauze and normal saline 0.9% Kerlix Wrap  [TWNoteComboBox1] : Left [TWNoteComboBox4] : Small [TWNoteComboBox5] : No [TWNoteComboBox6] : Surgical [de-identified] : No [de-identified] : other [de-identified] : Mild [de-identified] : None [de-identified] : 100% [de-identified] : Daily [de-identified] : Primary Dressing [TWNoteComboBox9] : Left [de-identified] : No [de-identified] : Diabetic [de-identified] : No [de-identified] : None [de-identified] : None

## 2024-01-11 NOTE — ASSESSMENT
[Verbal] : Verbal [Demo] : Demo [Patient] : Patient [Good - alert, interested, motivated] : Good - alert, interested, motivated [Verbalizes knowledge/Understanding] : Verbalizes knowledge/understanding [Dressing changes] : dressing changes [Foot Care] : foot care [Skin Care] : skin care [Signs and symptoms of infection] : sign and symptoms of infection [Nutrition] : nutrition [How and When to Call] : how and when to call [Off-loading] : off-loading [Patient responsibility to plan of care] : patient responsibility to plan of care [Glycemic Control] : glycemic control [Stable] : stable [Home] : Home [Ambulatory] : Ambulatory [Not Applicable - Long Term Care/Home Health Agency] : Long Term Care/Home Health Agency: Not Applicable [] : Yes [FreeTextEntry2] : Infection prevention  Wound care (dressing changes) Nutrition and wound healing Offloading the stress on skin structures and decreasing potential pathologic biomechanical influences. PT will maintain BP within individually acceptable range. HBOT [FreeTextEntry4] : Pts wife advised they did not change the bandage every day as previously directed; but changed it every other day. Pt comes here 3x/week for dressing changes; pt states he cannot perform his own dressing changes & wife performs them when she is not working. Pt states he has  IV abx until Monday 1/15/24. To date, patient has not been approved for HBOT.  Pts wife doesn't know if Peer to Peer occurred. DPM confirmed depth and cleaned wound.by removing callous.  Apply moisturizing cream to medial and lateral ankles. DPM wants to apply HCG4% clean next visit. F/U 1 week for assessment and every other day for dressing changes.

## 2024-01-11 NOTE — PLAN
[FreeTextEntry1] : .Patient seen and evaluated. Discussed etiology and treatment plan. Patient verbalized understanding. Continue post op dressing changes. Patient was declined HBOT and will need a peer to peer for HBOT. Will continue with local wound care and offloading. , PTR 3 days for dressing change.  Spent 20 minutes for patient care and medical decision making.

## 2024-01-12 ENCOUNTER — APPOINTMENT (OUTPATIENT)
Dept: WOUND CARE | Facility: HOSPITAL | Age: 74
End: 2024-01-12
Payer: COMMERCIAL

## 2024-01-12 ENCOUNTER — OUTPATIENT (OUTPATIENT)
Dept: OUTPATIENT SERVICES | Facility: HOSPITAL | Age: 74
LOS: 1 days | Discharge: HOME | End: 2024-01-12
Payer: COMMERCIAL

## 2024-01-12 VITALS
HEART RATE: 49 BPM | SYSTOLIC BLOOD PRESSURE: 149 MMHG | TEMPERATURE: 97.6 F | RESPIRATION RATE: 20 BRPM | BODY MASS INDEX: 25.05 KG/M2 | DIASTOLIC BLOOD PRESSURE: 60 MMHG | HEIGHT: 70 IN | WEIGHT: 175 LBS | OXYGEN SATURATION: 100 %

## 2024-01-12 DIAGNOSIS — E11.621 TYPE 2 DIABETES MELLITUS WITH FOOT ULCER: ICD-10-CM

## 2024-01-12 PROCEDURE — ZZZZZ: CPT

## 2024-01-12 PROCEDURE — G0463: CPT

## 2024-01-12 NOTE — DIETITIAN INITIAL EVALUATION ADULT - FLUID ACCUMULATION
[FreeTextEntry1] : Patient is a 70-year-old nulligravida with obesity and suspected sleep apnea, who is presenting with long history nocturia, overactive bladder, and mixed urinary incontinence (urgency greater than stress). She has tried Vesicare with suboptimal improvement in her symptoms. Patient presented for urodynamic testing on 12/27/2023. Her urodynamic test findings are consistent with an interrupted uroflow with voided volume of 370 cc, postvoid residual of 40 cc, max flow rate of 31, average flow rate of 12, and flow time of 31 seconds, her complex cystogram showed normal sensation, normal compliance, normal cystometric capacity of 488 cc, absence of detrusor overactivity during filling and presence of stress urinary incontinence starting at filled volume of 200 cc with Valsalva and with cough starting at filled volume of 300 cc; she voided 560 cc for pressure voiding study with max flow rate of 34, average flow rate of 12, flow time of 46 seconds, detrusor pressure at peak flow of 13 cm of water, normal continuous configuration and Valsalva appearing to be the mechanism of void. Patient presents today for discussion of urodynamic testing.  In addition, she would like me to have a look on her groin area where she had cellulitis and previous abscess. She also reports having a mammogram and a breast ultrasound at the [facility] few weeks ago.  She was told that she has a fatty lump and was advised to come back for repeat imaging in Jan 2025 Patient denies noticing any leakage with coughing sneezing or walking.  She reports mostly urgency and sometimes unable to make it to the restroom in time. 1+ generalized edema noted

## 2024-01-13 DIAGNOSIS — E11.69 TYPE 2 DIABETES MELLITUS WITH OTHER SPECIFIED COMPLICATION: ICD-10-CM
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
## 2024-01-13 DIAGNOSIS — T87.81 DEHISCENCE OF AMPUTATION STUMP: ICD-10-CM

## 2024-01-13 DIAGNOSIS — M86.672 OTHER CHRONIC OSTEOMYELITIS, LEFT ANKLE AND FOOT: ICD-10-CM

## 2024-01-13 DIAGNOSIS — Z89.422 ACQUIRED ABSENCE OF OTHER LEFT TOE(S): ICD-10-CM
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
## 2024-01-15 DIAGNOSIS — T87.81 DEHISCENCE OF AMPUTATION STUMP: ICD-10-CM
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
## 2024-01-15 DIAGNOSIS — E11.40 TYPE 2 DIABETES MELLITUS WITH DIABETIC NEUROPATHY, UNSPECIFIED: ICD-10-CM

## 2024-01-15 DIAGNOSIS — M86.672 OTHER CHRONIC OSTEOMYELITIS, LEFT ANKLE AND FOOT: ICD-10-CM

## 2024-01-15 DIAGNOSIS — Z79.899 OTHER LONG TERM (CURRENT) DRUG THERAPY: ICD-10-CM

## 2024-01-15 DIAGNOSIS — Z79.82 LONG TERM (CURRENT) USE OF ASPIRIN: ICD-10-CM

## 2024-01-15 DIAGNOSIS — E55.9 VITAMIN D DEFICIENCY, UNSPECIFIED: ICD-10-CM

## 2024-01-15 DIAGNOSIS — Z89.422 ACQUIRED ABSENCE OF OTHER LEFT TOE(S): ICD-10-CM

## 2024-01-15 DIAGNOSIS — L84 CORNS AND CALLOSITIES: ICD-10-CM

## 2024-01-15 DIAGNOSIS — E03.9 HYPOTHYROIDISM, UNSPECIFIED: ICD-10-CM

## 2024-01-15 DIAGNOSIS — E11.69 TYPE 2 DIABETES MELLITUS WITH OTHER SPECIFIED COMPLICATION: ICD-10-CM

## 2024-01-15 DIAGNOSIS — I10 ESSENTIAL (PRIMARY) HYPERTENSION: ICD-10-CM

## 2024-01-15 DIAGNOSIS — Z79.84 LONG TERM (CURRENT) USE OF ORAL HYPOGLYCEMIC DRUGS: ICD-10-CM

## 2024-01-15 DIAGNOSIS — E78.5 HYPERLIPIDEMIA, UNSPECIFIED: ICD-10-CM

## 2024-01-15 DIAGNOSIS — Z79.890 HORMONE REPLACEMENT THERAPY: ICD-10-CM

## 2024-01-16 ENCOUNTER — APPOINTMENT (OUTPATIENT)
Dept: WOUND CARE | Facility: HOSPITAL | Age: 74
End: 2024-01-16
Payer: COMMERCIAL

## 2024-01-16 ENCOUNTER — OUTPATIENT (OUTPATIENT)
Dept: OUTPATIENT SERVICES | Facility: HOSPITAL | Age: 74
LOS: 1 days | Discharge: HOME | End: 2024-01-16
Payer: COMMERCIAL

## 2024-01-16 VITALS
SYSTOLIC BLOOD PRESSURE: 193 MMHG | TEMPERATURE: 97.6 F | DIASTOLIC BLOOD PRESSURE: 69 MMHG | HEIGHT: 70 IN | HEART RATE: 50 BPM | OXYGEN SATURATION: 99 % | BODY MASS INDEX: 25.05 KG/M2 | WEIGHT: 175 LBS | RESPIRATION RATE: 18 BRPM

## 2024-01-16 DIAGNOSIS — E11.621 TYPE 2 DIABETES MELLITUS WITH FOOT ULCER: ICD-10-CM

## 2024-01-16 DIAGNOSIS — L97.422 NON-PRESSURE CHRONIC ULCER OF LEFT HEEL AND MIDFOOT WITH FAT LAYER EXPOSED: ICD-10-CM

## 2024-01-16 PROCEDURE — 99024 POSTOP FOLLOW-UP VISIT: CPT

## 2024-01-16 PROCEDURE — G0463: CPT

## 2024-01-16 NOTE — ASSESSMENT
[Verbal] : Verbal [Demo] : Demo [Patient] : Patient [Good - alert, interested, motivated] : Good - alert, interested, motivated [Verbalizes knowledge/Understanding] : Verbalizes knowledge/understanding [Dressing changes] : dressing changes [Foot Care] : foot care [Skin Care] : skin care [Signs and symptoms of infection] : sign and symptoms of infection [Nutrition] : nutrition [How and When to Call] : how and when to call [Off-loading] : off-loading [Patient responsibility to plan of care] : patient responsibility to plan of care [Glycemic Control] : glycemic control [Stable] : stable [Home] : Home [Ambulatory] : Ambulatory [Not Applicable - Long Term Care/Home Health Agency] : Long Term Care/Home Health Agency: Not Applicable [Written] : Written [] : No [FreeTextEntry2] : Infection prevention  Wound care (dressing changes) Nutrition and wound healing Offloading the stress on skin structures and decreasing potential pathologic biomechanical influences. PT will maintain BP within individually acceptable range. HBOT [FreeTextEntry4] : Pt completed IV abx on Monday 1/15/24; infusion RN removed PICC line this morning. Patient has not been approved for HBOT because wound is healed. DPM confirmed to clean with HCG4% DPM confirmed wound is completely healed. Just wear a dry sock. Pt can shower now. F/U 1 week; no need for dressing changes anymore.

## 2024-01-16 NOTE — HISTORY OF PRESENT ILLNESS
[FreeTextEntry1] : s/p amp 4th toe and partial metatarsal left foot , healed , no openings , no soi

## 2024-01-16 NOTE — REVIEW OF SYSTEMS
[Fever] : no fever [Chills] : no chills [Eye Pain] : no eye pain [Earache] : no earache [Shortness Of Breath] : no shortness of breath [Wheezing] : no wheezing [Abdominal Pain] : no abdominal pain [Arthralgias] : arthralgias [Joint Stiffness] : joint stiffness [Skin Wound] : skin wound [Limb Weakness] : no limb weakness [Difficulty Walking] : no difficulty walking [Anxiety] : no anxiety [Negative] : Heme/Lymph [FreeTextEntry5] : HTN, HLD [de-identified] : s/p amp 4th toe and partial metatarsal , closed  [FreeTextEntry9] : Swollen left foot  [de-identified] : NIDDM with neuropathy  [de-identified] : NIDDM

## 2024-01-16 NOTE — PHYSICAL EXAM
[0] : left 0 [1+] : left 1+ [Ankle Swelling (On Exam)] : present [Ankle Swelling Bilaterally] : bilaterally  [Varicose Veins Of Lower Extremities] : bilaterally [Ankle Swelling On The Right] : mild [] : bilaterally [Stool Sample Taken] : No stool obtained  on rectal exam [Purpura] : no purpura  [Petechiae] : no petechiae [Skin Ulcer] : no ulcer [Skin Induration] : no induration [Alert] : alert [Oriented to Person] : oriented to person [Oriented to Place] : oriented to place [Oriented to Time] : oriented to time [Calm] : calm [de-identified] : A&Ox3, NAD [de-identified] : HTN, HLD [de-identified] : 5 out of 5 strength in all quadrants bilaterally [de-identified] : Left foot plantar first metatarsal head closed, s/p amp 4th toe and partial 4th metatarsal ,  closed , no open wounds  [de-identified] : Loss of light and sensation bilaterally [FreeTextEntry1] : Left Foot, S/P 4th metatarsal and toe amputation - HEALED [de-identified] : none [de-identified] : calloused [de-identified] : No Product [de-identified] : Mechanically cleansed with sterile gauze and normal saline 0.9% & HCG 4% Stockingette  [FreeTextEntry7] : Plantar Hallux: CLOSED [de-identified] : Calloused [de-identified] :  Mechanically cleansed with sterile gauze and normal saline 0.9% Kerlix Wrap  [TWNoteComboBox1] : False [TWNoteComboBox4] : None [TWNoteComboBox5] : No [TWNoteComboBox6] : Surgical [de-identified] : No [de-identified] : other [de-identified] : Mild [de-identified] : None [de-identified] : 100% [TWNoteComboBox9] : Left [de-identified] : No [de-identified] : Diabetic [de-identified] : No [de-identified] : None [de-identified] : None

## 2024-01-16 NOTE — PLAN
[FreeTextEntry1] : Patient able to shower , wear white socks .Patient happy with the outcome . ptr 1 week  Spent 20 minutes for patient care and medical decision making.

## 2024-01-17 DIAGNOSIS — Z89.422 ACQUIRED ABSENCE OF OTHER LEFT TOE(S): ICD-10-CM

## 2024-01-17 DIAGNOSIS — I10 ESSENTIAL (PRIMARY) HYPERTENSION: ICD-10-CM

## 2024-01-17 DIAGNOSIS — L84 CORNS AND CALLOSITIES: ICD-10-CM
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
## 2024-01-17 DIAGNOSIS — E78.5 HYPERLIPIDEMIA, UNSPECIFIED: ICD-10-CM

## 2024-01-17 DIAGNOSIS — M86.672 OTHER CHRONIC OSTEOMYELITIS, LEFT ANKLE AND FOOT: ICD-10-CM

## 2024-01-17 DIAGNOSIS — Z79.82 LONG TERM (CURRENT) USE OF ASPIRIN: ICD-10-CM

## 2024-01-17 DIAGNOSIS — T87.81 DEHISCENCE OF AMPUTATION STUMP: ICD-10-CM
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
## 2024-01-17 DIAGNOSIS — E11.40 TYPE 2 DIABETES MELLITUS WITH DIABETIC NEUROPATHY, UNSPECIFIED: ICD-10-CM

## 2024-01-17 DIAGNOSIS — E11.69 TYPE 2 DIABETES MELLITUS WITH OTHER SPECIFIED COMPLICATION: ICD-10-CM

## 2024-01-17 DIAGNOSIS — E03.9 HYPOTHYROIDISM, UNSPECIFIED: ICD-10-CM

## 2024-01-17 DIAGNOSIS — E55.9 VITAMIN D DEFICIENCY, UNSPECIFIED: ICD-10-CM

## 2024-01-17 DIAGNOSIS — Z79.899 OTHER LONG TERM (CURRENT) DRUG THERAPY: ICD-10-CM

## 2024-01-17 DIAGNOSIS — Z79.84 LONG TERM (CURRENT) USE OF ORAL HYPOGLYCEMIC DRUGS: ICD-10-CM

## 2024-01-17 DIAGNOSIS — Z79.890 HORMONE REPLACEMENT THERAPY: ICD-10-CM

## 2024-01-18 ENCOUNTER — APPOINTMENT (OUTPATIENT)
Dept: WOUND CARE | Facility: HOSPITAL | Age: 74
End: 2024-01-18

## 2024-01-20 ENCOUNTER — APPOINTMENT (OUTPATIENT)
Dept: WOUND CARE | Facility: HOSPITAL | Age: 74
End: 2024-01-20

## 2024-01-24 ENCOUNTER — APPOINTMENT (OUTPATIENT)
Dept: WOUND CARE | Facility: HOSPITAL | Age: 74
End: 2024-01-24
Payer: COMMERCIAL

## 2024-01-24 ENCOUNTER — OUTPATIENT (OUTPATIENT)
Dept: OUTPATIENT SERVICES | Facility: HOSPITAL | Age: 74
LOS: 1 days | Discharge: HOME | End: 2024-01-24
Payer: COMMERCIAL

## 2024-01-24 VITALS
RESPIRATION RATE: 20 BRPM | HEART RATE: 48 BPM | DIASTOLIC BLOOD PRESSURE: 74 MMHG | BODY MASS INDEX: 25.05 KG/M2 | WEIGHT: 175 LBS | OXYGEN SATURATION: 98 % | TEMPERATURE: 98 F | HEIGHT: 70 IN | SYSTOLIC BLOOD PRESSURE: 166 MMHG

## 2024-01-24 DIAGNOSIS — E11.621 TYPE 2 DIABETES MELLITUS WITH FOOT ULCER: ICD-10-CM

## 2024-01-24 PROCEDURE — G0463: CPT

## 2024-01-24 PROCEDURE — 99024 POSTOP FOLLOW-UP VISIT: CPT

## 2024-01-31 DIAGNOSIS — M86.672 OTHER CHRONIC OSTEOMYELITIS, LEFT ANKLE AND FOOT: ICD-10-CM

## 2024-01-31 DIAGNOSIS — E03.9 HYPOTHYROIDISM, UNSPECIFIED: ICD-10-CM
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
## 2024-01-31 DIAGNOSIS — Z79.899 OTHER LONG TERM (CURRENT) DRUG THERAPY: ICD-10-CM

## 2024-01-31 DIAGNOSIS — T87.81 DEHISCENCE OF AMPUTATION STUMP: ICD-10-CM

## 2024-01-31 DIAGNOSIS — E78.5 HYPERLIPIDEMIA, UNSPECIFIED: ICD-10-CM

## 2024-01-31 DIAGNOSIS — I10 ESSENTIAL (PRIMARY) HYPERTENSION: ICD-10-CM

## 2024-01-31 DIAGNOSIS — E55.9 VITAMIN D DEFICIENCY, UNSPECIFIED: ICD-10-CM

## 2024-01-31 DIAGNOSIS — E11.40 TYPE 2 DIABETES MELLITUS WITH DIABETIC NEUROPATHY, UNSPECIFIED: ICD-10-CM

## 2024-01-31 DIAGNOSIS — E11.69 TYPE 2 DIABETES MELLITUS WITH OTHER SPECIFIED COMPLICATION: ICD-10-CM

## 2024-01-31 DIAGNOSIS — Z89.422 ACQUIRED ABSENCE OF OTHER LEFT TOE(S): ICD-10-CM

## 2024-01-31 DIAGNOSIS — Z79.82 LONG TERM (CURRENT) USE OF ASPIRIN: ICD-10-CM
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
## 2024-01-31 DIAGNOSIS — Z79.890 HORMONE REPLACEMENT THERAPY: ICD-10-CM

## 2024-01-31 DIAGNOSIS — Z79.84 LONG TERM (CURRENT) USE OF ORAL HYPOGLYCEMIC DRUGS: ICD-10-CM

## 2024-01-31 NOTE — REVIEW OF SYSTEMS
[Fever] : no fever [Chills] : no chills [Eye Pain] : no eye pain [Earache] : no earache [Shortness Of Breath] : no shortness of breath [Wheezing] : no wheezing [Abdominal Pain] : no abdominal pain [Arthralgias] : arthralgias [Joint Stiffness] : joint stiffness [Skin Wound] : skin wound [Limb Weakness] : no limb weakness [Difficulty Walking] : no difficulty walking [Anxiety] : no anxiety [Negative] : Heme/Lymph [FreeTextEntry5] : HTN, HLD [FreeTextEntry9] : Swollen left foot  [de-identified] : s/p amp 4th toe and partial metatarsal , closed  [de-identified] : NIDDM with neuropathy  [de-identified] : NIDDM

## 2024-01-31 NOTE — PHYSICAL EXAM
[0] : left 0 [1+] : left 1+ [Ankle Swelling (On Exam)] : present [Ankle Swelling Bilaterally] : bilaterally  [Varicose Veins Of Lower Extremities] : bilaterally [Ankle Swelling On The Right] : mild [] : bilaterally [Stool Sample Taken] : No stool obtained  on rectal exam [Purpura] : no purpura  [Petechiae] : no petechiae [Skin Ulcer] : no ulcer [Alert] : alert [Skin Induration] : no induration [Oriented to Person] : oriented to person [Oriented to Place] : oriented to place [Oriented to Time] : oriented to time [Calm] : calm [de-identified] : A&Ox3, NAD [de-identified] : HTN, HLD [de-identified] : 5 out of 5 strength in all quadrants bilaterally [de-identified] : Left foot plantar first metatarsal head closed, s/p amp 4th toe and partial 4th metatarsal ,  closed , no open wounds  [de-identified] : Loss of light and sensation bilaterally [FreeTextEntry1] : Left Foot, S/P 4th metatarsal and toe amputation - HEALED [de-identified] : No Product [de-identified] : Mechanically cleansed with 0.9% Normal Saline   [FreeTextEntry7] : Left Hallux Plantar  - CLOSED [TWNoteComboBox6] : Surgical [de-identified] : Diabetic

## 2024-01-31 NOTE — HISTORY OF PRESENT ILLNESS
[FreeTextEntry1] : Patient is 73 year old male presenting s/p left foot 4th digit and 4th metatarsal head resection for osteomyelitis with dehiscence to the central surgical site. Patient is accompanied by his spouse. Patient's pathology is (+) for OM and finished PICC line abx.

## 2024-01-31 NOTE — PLAN
[FreeTextEntry1] : Patient able to shower , wear white socks .Patient happy with the outcome.  Patient to c/w ambulation in regular shoes.  Discussed with patient to wear supportive shoe gear with wide toed shoes and extra depth in toe box to accommodate the deformity. Discussed with patient to perform daily foot checks and monitor for any new lesions, open wounds or calluses and to return to clinic at the earliest. RTC  in 2 weeks.  Spent 20 minutes for patient care and medical decision making.

## 2024-01-31 NOTE — ASSESSMENT
[Verbal] : Verbal [Written] : Written [Demo] : Demo [Patient] : Patient [Good - alert, interested, motivated] : Good - alert, interested, motivated [Verbalizes knowledge/Understanding] : Verbalizes knowledge/understanding [Foot Care] : foot care [Skin Care] : skin care [Nutrition] : nutrition [Signs and symptoms of infection] : sign and symptoms of infection [How and When to Call] : how and when to call [Off-loading] : off-loading [Patient responsibility to plan of care] : patient responsibility to plan of care [Glycemic Control] : glycemic control [Stable] : stable [Home] : Home [Ambulatory] : Ambulatory [Not Applicable - Long Term Care/Home Health Agency] : Long Term Care/Home Health Agency: Not Applicable [Pressure relief] : pressure relief [] : Yes [FreeTextEntry2] : Infection prevention  Infection control Wound care Nutrition and wound healing  Glycemic control.  Protct and guard wound site.  [FreeTextEntry4] : KELLE Dobbs assessed wound site - wound remains closed / healed.  No dressing needed.  Pt may return to normal activities, and go to the gym. Pt can transition into a regular shoe over the upcoming week.  Patient verbalized understanding of all discussed. Patient to return to [facility] in Two  weeks.

## 2024-02-07 ENCOUNTER — NON-APPOINTMENT (OUTPATIENT)
Age: 74
End: 2024-02-07

## 2024-02-08 ENCOUNTER — OUTPATIENT (OUTPATIENT)
Dept: OUTPATIENT SERVICES | Facility: HOSPITAL | Age: 74
LOS: 1 days | Discharge: HOME | End: 2024-02-08
Payer: COMMERCIAL

## 2024-02-08 ENCOUNTER — APPOINTMENT (OUTPATIENT)
Dept: WOUND CARE | Facility: HOSPITAL | Age: 74
End: 2024-02-08
Payer: COMMERCIAL

## 2024-02-08 VITALS
HEIGHT: 70 IN | TEMPERATURE: 97.7 F | HEART RATE: 54 BPM | BODY MASS INDEX: 25.05 KG/M2 | DIASTOLIC BLOOD PRESSURE: 70 MMHG | OXYGEN SATURATION: 99 % | WEIGHT: 175 LBS | SYSTOLIC BLOOD PRESSURE: 178 MMHG | RESPIRATION RATE: 18 BRPM

## 2024-02-08 DIAGNOSIS — E11.621 TYPE 2 DIABETES MELLITUS WITH FOOT ULCER: ICD-10-CM

## 2024-02-08 PROCEDURE — G0463: CPT

## 2024-02-08 PROCEDURE — 99213 OFFICE O/P EST LOW 20 MIN: CPT | Mod: 24

## 2024-02-17 NOTE — PHYSICAL EXAM
[0] : left 0 [1+] : left 1+ [Ankle Swelling (On Exam)] : present [Ankle Swelling Bilaterally] : bilaterally  [Varicose Veins Of Lower Extremities] : bilaterally [Ankle Swelling On The Right] : mild [] : bilaterally [Stool Sample Taken] : No stool obtained  on rectal exam [Purpura] : no purpura  [Petechiae] : no petechiae [Skin Ulcer] : no ulcer [Skin Induration] : no induration [Alert] : alert [Oriented to Person] : oriented to person [Oriented to Place] : oriented to place [Oriented to Time] : oriented to time [Calm] : calm [de-identified] : A&Ox3, NAD [de-identified] : HTN, HLD [de-identified] : 5 out of 5 strength in all quadrants bilaterally [de-identified] : Left foot plantar first metatarsal head closed, s/p amp 4th toe and partial 4th metatarsal, closed, no open wounds - healed  [de-identified] : Loss of light and sensation bilaterally [FreeTextEntry1] : s/p 4th metatarsal and toe amputation(healed) [FreeTextEntry7] : hallux plantar(closed) [TWNoteComboBox1] : Left [TWNoteComboBox6] : Surgical [TWNoteComboBox9] : Left [de-identified] : Diabetic

## 2024-02-17 NOTE — ASSESSMENT
[Verbal] : Verbal [Written] : Written [Patient] : Patient [Good - alert, interested, motivated] : Good - alert, interested, motivated [Verbalizes knowledge/Understanding] : Verbalizes knowledge/understanding [Foot Care] : foot care [Skin Care] : skin care [Signs and symptoms of infection] : sign and symptoms of infection [How and When to Call] : how and when to call [Compression Therapy] : compression therapy [Patient responsibility to plan of care] : patient responsibility to plan of care [] : Yes [Stable] : stable [Home] : Home [Ambulatory] : Ambulatory [FreeTextEntry2] : 1. Maintain skin integrity. 2. Prevent infection. 3. Monitor glycemic levels. [FreeTextEntry4] : Patient will return to see Dr. Dobbs in 4 to 6 weeks. Wear compression stockings bilaterally.

## 2024-02-17 NOTE — HISTORY OF PRESENT ILLNESS
[FreeTextEntry1] : Patient is 73 year old male presenting s/p left foot 4th digit and 4th metatarsal head resection for osteomyelitis with dehiscence to the central surgical site. Patient is accompanied by his spouse. Patient's pathology is (+) for OM and finished PICC line abx. Patient has transitioned into regular shoes. Patient denies any pain to the left foot.

## 2024-02-17 NOTE — PLAN
[FreeTextEntry1] : Patient able to shower , wear white socks .Patient happy with the outcome. Discussed with patient to wear supportive shoe gear with wide toed shoes and extra depth in toe box to accommodate the deformity. Discussed with patient to perform daily foot checks and monitor for any new lesions, open wounds or calluses and to return to clinic at the earliest. RTC  in 4-6 weeks.   Spent 20 minutes for patient care and medical decision making.

## 2024-02-17 NOTE — REVIEW OF SYSTEMS
[Fever] : no fever [Chills] : no chills [Eye Pain] : no eye pain [Earache] : no earache [Shortness Of Breath] : no shortness of breath [Wheezing] : no wheezing [Abdominal Pain] : no abdominal pain [Arthralgias] : arthralgias [Joint Stiffness] : joint stiffness [Skin Wound] : skin wound [Limb Weakness] : no limb weakness [Difficulty Walking] : no difficulty walking [Anxiety] : no anxiety [Negative] : Heme/Lymph [FreeTextEntry5] : HTN, HLD [FreeTextEntry9] : Swollen left foot  [de-identified] : s/p amp 4th toe and partial metatarsal , closed  [de-identified] : NIDDM with neuropathy  [de-identified] : NIDDM

## 2024-02-17 NOTE — VITALS
[Pain related to present condition?] : The patient's  pain is not related to present condition. [] : No [de-identified] : 0/10

## 2024-02-18 DIAGNOSIS — Z79.84 LONG TERM (CURRENT) USE OF ORAL HYPOGLYCEMIC DRUGS: ICD-10-CM

## 2024-02-18 DIAGNOSIS — E78.5 HYPERLIPIDEMIA, UNSPECIFIED: ICD-10-CM

## 2024-02-18 DIAGNOSIS — Z79.890 HORMONE REPLACEMENT THERAPY: ICD-10-CM

## 2024-02-18 DIAGNOSIS — E55.9 VITAMIN D DEFICIENCY, UNSPECIFIED: ICD-10-CM

## 2024-02-18 DIAGNOSIS — E11.40 TYPE 2 DIABETES MELLITUS WITH DIABETIC NEUROPATHY, UNSPECIFIED: ICD-10-CM
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
## 2024-02-18 DIAGNOSIS — Z89.422 ACQUIRED ABSENCE OF OTHER LEFT TOE(S): ICD-10-CM

## 2024-02-18 DIAGNOSIS — T87.89 OTHER COMPLICATIONS OF AMPUTATION STUMP: ICD-10-CM

## 2024-02-18 DIAGNOSIS — Z79.899 OTHER LONG TERM (CURRENT) DRUG THERAPY: ICD-10-CM

## 2024-02-18 DIAGNOSIS — E11.69 TYPE 2 DIABETES MELLITUS WITH OTHER SPECIFIED COMPLICATION: ICD-10-CM

## 2024-02-18 DIAGNOSIS — M86.672 OTHER CHRONIC OSTEOMYELITIS, LEFT ANKLE AND FOOT: ICD-10-CM

## 2024-02-18 DIAGNOSIS — Z79.82 LONG TERM (CURRENT) USE OF ASPIRIN: ICD-10-CM

## 2024-02-18 DIAGNOSIS — I10 ESSENTIAL (PRIMARY) HYPERTENSION: ICD-10-CM
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
## 2024-02-18 DIAGNOSIS — E03.9 HYPOTHYROIDISM, UNSPECIFIED: ICD-10-CM

## 2024-03-07 ENCOUNTER — OUTPATIENT (OUTPATIENT)
Dept: OUTPATIENT SERVICES | Facility: HOSPITAL | Age: 74
LOS: 1 days | Discharge: HOME | End: 2024-03-07
Payer: COMMERCIAL

## 2024-03-07 ENCOUNTER — APPOINTMENT (OUTPATIENT)
Dept: WOUND CARE | Facility: HOSPITAL | Age: 74
End: 2024-03-07
Payer: COMMERCIAL

## 2024-03-07 VITALS
HEART RATE: 65 BPM | DIASTOLIC BLOOD PRESSURE: 78 MMHG | RESPIRATION RATE: 18 BRPM | WEIGHT: 175 LBS | OXYGEN SATURATION: 99 % | TEMPERATURE: 98.3 F | BODY MASS INDEX: 25.05 KG/M2 | HEIGHT: 70 IN | SYSTOLIC BLOOD PRESSURE: 146 MMHG

## 2024-03-07 DIAGNOSIS — T87.89 OTHER COMPLICATIONS OF AMPUTATION STUMP: ICD-10-CM

## 2024-03-07 DIAGNOSIS — T87.81 DEHISCENCE OF AMPUTATION STUMP: ICD-10-CM

## 2024-03-07 PROCEDURE — G0463: CPT

## 2024-03-07 PROCEDURE — 99213 OFFICE O/P EST LOW 20 MIN: CPT

## 2024-03-07 NOTE — ASSESSMENT
[Verbal] : Verbal [Written] : Written [Patient] : Patient [Good - alert, interested, motivated] : Good - alert, interested, motivated [Verbalizes knowledge/Understanding] : Verbalizes knowledge/understanding [Foot Care] : foot care [Signs and symptoms of infection] : sign and symptoms of infection [Skin Care] : skin care [How and When to Call] : how and when to call [Compression Therapy] : compression therapy [Patient responsibility to plan of care] : patient responsibility to plan of care [Stable] : stable [Home] : Home [Ambulatory] : Ambulatory [Dressing changes] : dressing changes [Pressure relief] : pressure relief [Off-loading] : off-loading [] : Yes [Not Applicable - Long Term Care/Home Health Agency] : Long Term Care/Home Health Agency: Not Applicable [FreeTextEntry2] : Infection prevention Wound care (dressing changes)- Devon Donut Pad for offloading Compression therapy Nutrition and wound healing Elevation and low sodium compliance. Pressure relief/ Pressure redistribution Offloading the stress on skin structures and decreasing potential pathologic biomechanical influences.  [FreeTextEntry4] : Patient will return to see Dr. Dobbs in 6 weeks for callus management. Continue to wear compression stockings bilaterally. Apply moisturizing lotion to lower legs (patient concerned with dryness of left lower leg). Apply Allevyn Foam, offloading donut pad, to offload pressure on the left plantar foot callus. Daily inspection of feet/callused area.

## 2024-03-07 NOTE — PLAN
[FreeTextEntry1] : .Patient seen and evaluated. Discussed etiology and treatment plan. Patient verbalized understanding. Patient with an ulcerative lesion to plantar left 1st metatarsal head. Applied donut pad for offloading.  Discussed with patient to wear supportive shoe gear with wide toed shoes and extra depth in toe box to accommodate the deformity. Discussed with patient to perform daily foot checks and monitor for any new lesions, open wounds or calluses and to return to clinic at the earliest. RTC  in 6-8 weeks.   Spent 20 minutes for patient care and medical decision making.

## 2024-03-07 NOTE — VITALS
[Pain related to present condition?] : The patient's  pain is not related to present condition. [] : No [de-identified] : 0/10

## 2024-03-07 NOTE — HISTORY OF PRESENT ILLNESS
[FreeTextEntry1] : Patient is 73 year old male presenting s/p left foot 4th digit and 4th metatarsal head resection for osteomyelitis with dehiscence to the central surgical site healed.  Patient has resumed regular activities.   Patient denies any pain to the left foot.

## 2024-03-07 NOTE — PHYSICAL EXAM
[0] : left 0 [1+] : left 1+ [Ankle Swelling (On Exam)] : present [Ankle Swelling Bilaterally] : bilaterally  [Varicose Veins Of Lower Extremities] : bilaterally [Ankle Swelling On The Right] : mild [] : bilaterally [Stool Sample Taken] : No stool obtained  on rectal exam [Purpura] : no purpura  [Petechiae] : no petechiae [Skin Ulcer] : no ulcer [Skin Induration] : no induration [Alert] : alert [Oriented to Person] : oriented to person [Oriented to Place] : oriented to place [Oriented to Time] : oriented to time [Calm] : calm [de-identified] : HTN, HLD [de-identified] : A&Ox3, NAD [de-identified] : Left foot plantar first metatarsal head hyperkeratotic tissue post debridement no  open wounds, s/p amp 4th toe and partial 4th metatarsal, closed, no open wounds - healed  [de-identified] : 5 out of 5 strength in all quadrants bilaterally [de-identified] : Loss of light and sensation bilaterally [FreeTextEntry1] : s/p 4th metatarsal and toe amputation(healed) [FreeTextEntry7] : hallux plantar(closed) [de-identified] : No wound care product [de-identified] : DPM shaved callus, area of dried sanguineous drainage note beneath the callus. Provided patient with offloading Allevyn pad (offloading donut).  [TWNoteComboBox1] : Left [TWNoteComboBox6] : Surgical [TWNoteComboBox9] : Left [de-identified] : Diabetic

## 2024-03-07 NOTE — REVIEW OF SYSTEMS
[Fever] : no fever [Chills] : no chills [Eye Pain] : no eye pain [Earache] : no earache [Shortness Of Breath] : no shortness of breath [Wheezing] : no wheezing [Abdominal Pain] : no abdominal pain [Arthralgias] : arthralgias [Joint Stiffness] : joint stiffness [Skin Wound] : skin wound [Limb Weakness] : no limb weakness [Difficulty Walking] : no difficulty walking [Anxiety] : no anxiety [Negative] : Heme/Lymph [FreeTextEntry5] : HTN, HLD [FreeTextEntry9] : Swollen left foot  [de-identified] : s/p amp 4th toe and partial metatarsal , closed  [de-identified] : NIDDM with neuropathy  [de-identified] : NIDDM

## 2024-03-10 DIAGNOSIS — E55.9 VITAMIN D DEFICIENCY, UNSPECIFIED: ICD-10-CM

## 2024-03-10 DIAGNOSIS — Z79.890 HORMONE REPLACEMENT THERAPY: ICD-10-CM

## 2024-03-10 DIAGNOSIS — T87.81 DEHISCENCE OF AMPUTATION STUMP: ICD-10-CM

## 2024-03-10 DIAGNOSIS — E11.40 TYPE 2 DIABETES MELLITUS WITH DIABETIC NEUROPATHY, UNSPECIFIED: ICD-10-CM

## 2024-03-10 DIAGNOSIS — E78.5 HYPERLIPIDEMIA, UNSPECIFIED: ICD-10-CM

## 2024-03-10 DIAGNOSIS — Z79.84 LONG TERM (CURRENT) USE OF ORAL HYPOGLYCEMIC DRUGS: ICD-10-CM
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
## 2024-03-10 DIAGNOSIS — Z89.422 ACQUIRED ABSENCE OF OTHER LEFT TOE(S): ICD-10-CM

## 2024-03-10 DIAGNOSIS — I10 ESSENTIAL (PRIMARY) HYPERTENSION: ICD-10-CM
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
## 2024-03-10 DIAGNOSIS — M86.672 OTHER CHRONIC OSTEOMYELITIS, LEFT ANKLE AND FOOT: ICD-10-CM

## 2024-03-10 DIAGNOSIS — Z79.82 LONG TERM (CURRENT) USE OF ASPIRIN: ICD-10-CM

## 2024-03-10 DIAGNOSIS — Z79.899 OTHER LONG TERM (CURRENT) DRUG THERAPY: ICD-10-CM

## 2024-03-10 DIAGNOSIS — E03.9 HYPOTHYROIDISM, UNSPECIFIED: ICD-10-CM

## 2024-03-10 DIAGNOSIS — E11.69 TYPE 2 DIABETES MELLITUS WITH OTHER SPECIFIED COMPLICATION: ICD-10-CM

## 2024-04-18 NOTE — ED PROVIDER NOTE - DISPOSITION TYPE
Lab Results   Component Value Date    HGBA1C 7.8 (A) 11/21/2023     Suboptimally controlled, with time in range of 19%, hyperglycemia due to chemotherapy, as well as diet which is high carb diet,  I increased Tresiba to 34 units nightly, NovoLog 10 units 3 times daily AC plus correctional insulin with ISF of 50 and blood glucose goal of 150.  Continue metformin and Jardiance.  Hypoglycemia symptoms and treatment were reviewed.  Return back in 3 months.     ADMIT

## 2024-04-23 ENCOUNTER — OUTPATIENT (OUTPATIENT)
Dept: OUTPATIENT SERVICES | Facility: HOSPITAL | Age: 74
LOS: 1 days | Discharge: HOME | End: 2024-04-23
Payer: COMMERCIAL

## 2024-04-23 ENCOUNTER — APPOINTMENT (OUTPATIENT)
Dept: WOUND CARE | Facility: HOSPITAL | Age: 74
End: 2024-04-23
Payer: COMMERCIAL

## 2024-04-23 VITALS
RESPIRATION RATE: 18 BRPM | DIASTOLIC BLOOD PRESSURE: 78 MMHG | TEMPERATURE: 97.9 F | HEIGHT: 70 IN | BODY MASS INDEX: 25.05 KG/M2 | HEART RATE: 48 BPM | WEIGHT: 175 LBS | SYSTOLIC BLOOD PRESSURE: 200 MMHG | OXYGEN SATURATION: 99 %

## 2024-04-23 DIAGNOSIS — E11.69 TYPE 2 DIABETES MELLITUS WITH OTHER SPECIFIED COMPLICATION: ICD-10-CM

## 2024-04-23 DIAGNOSIS — M86.672 OTHER CHRONIC OSTEOMYELITIS, LEFT ANKLE AND FOOT: ICD-10-CM

## 2024-04-23 DIAGNOSIS — T87.81 DEHISCENCE OF AMPUTATION STUMP: ICD-10-CM

## 2024-04-23 DIAGNOSIS — E88.9 TYPE 2 DIABETES MELLITUS WITH OTHER SPECIFIED COMPLICATION: ICD-10-CM

## 2024-04-23 DIAGNOSIS — T81.89XD OTHER COMPLICATIONS OF PROCEDURES, NOT ELSEWHERE CLASSIFIED, SUBSEQUENT ENCOUNTER: ICD-10-CM

## 2024-04-23 PROCEDURE — G0463: CPT

## 2024-04-23 PROCEDURE — 99213 OFFICE O/P EST LOW 20 MIN: CPT

## 2024-04-25 PROBLEM — M86.672 CHRONIC OSTEOMYELITIS OF LEFT FOOT: Status: ACTIVE | Noted: 2023-12-15

## 2024-04-25 PROBLEM — E11.69 ABNORMAL METABOLIC STATE IN DIABETES MELLITUS: Status: ACTIVE | Noted: 2023-12-15

## 2024-04-25 PROBLEM — T87.81: Status: ACTIVE | Noted: 2024-01-08

## 2024-04-25 NOTE — ASSESSMENT
[Verbal] : Verbal [Written] : Written [Patient] : Patient [Good - alert, interested, motivated] : Good - alert, interested, motivated [Verbalizes knowledge/Understanding] : Verbalizes knowledge/understanding [Dressing changes] : dressing changes [Foot Care] : foot care [Skin Care] : skin care [Pressure relief] : pressure relief [Signs and symptoms of infection] : sign and symptoms of infection [How and When to Call] : how and when to call [Off-loading] : off-loading [Compression Therapy] : compression therapy [Patient responsibility to plan of care] : patient responsibility to plan of care [] : Yes [Stable] : stable [Home] : Home [Ambulatory] : Ambulatory [Not Applicable - Long Term Care/Home Health Agency] : Long Term Care/Home Health Agency: Not Applicable [FreeTextEntry2] : Infection prevention Wound care (dressing changes)- Devon Donut Pad for offloading Compression therapy Nutrition and wound healing Elevation and low sodium compliance. Pressure relief/ Pressure redistribution Offloading the stress on skin structures and decreasing potential pathologic biomechanical influences.  [FreeTextEntry4] : Advised to Apply moisturizing lotion to lower legs daily, Daily inspection of feet/callused area. Follow up in 8-9 weeks

## 2024-04-25 NOTE — PLAN
[FreeTextEntry1] : .Patient seen and evaluated. Discussed etiology and treatment plan. Patient verbalized understanding.  Discussed with patient to wear supportive shoe gear with wide toed shoes and extra depth in toe box to accommodate the deformity. Discussed with patient to perform daily foot checks and monitor for any new lesions, open wounds or calluses and to return to clinic at the earliest. RTC  in 8-10 weeks    Spent 20 minutes for patient care and medical decision making.

## 2024-04-25 NOTE — VITALS
[Pain related to present condition?] : The patient's  pain is not related to present condition. [] : No [de-identified] : 0/10

## 2024-04-25 NOTE — REVIEW OF SYSTEMS
[Fever] : no fever [Chills] : no chills [Eye Pain] : no eye pain [Earache] : no earache [Shortness Of Breath] : no shortness of breath [Wheezing] : no wheezing [Abdominal Pain] : no abdominal pain [Arthralgias] : arthralgias [Joint Stiffness] : joint stiffness [Skin Wound] : skin wound [Limb Weakness] : no limb weakness [Difficulty Walking] : no difficulty walking [Anxiety] : no anxiety [Negative] : Heme/Lymph [FreeTextEntry5] : HTN, HLD [FreeTextEntry9] : Swollen left foot  [de-identified] : s/p amp 4th toe and partial metatarsal , closed ; painful callus plantar 1st metatarsal head  [de-identified] : NIDDM with neuropathy  [de-identified] : NIDDM

## 2024-04-25 NOTE — PHYSICAL EXAM
[0] : left 0 [1+] : left 1+ [Ankle Swelling (On Exam)] : present [Ankle Swelling Bilaterally] : bilaterally  [Varicose Veins Of Lower Extremities] : bilaterally [Ankle Swelling On The Right] : mild [] : bilaterally [Stool Sample Taken] : No stool obtained  on rectal exam [Purpura] : no purpura  [Petechiae] : no petechiae [Skin Ulcer] : no ulcer [Skin Induration] : no induration [Alert] : alert [Oriented to Person] : oriented to person [Oriented to Place] : oriented to place [Oriented to Time] : oriented to time [Calm] : calm [de-identified] : A&Ox3, NAD [de-identified] : HTN, HLD [de-identified] : 5 out of 5 strength in all quadrants bilaterally [de-identified] : Left foot plantar first metatarsal head hyperkeratotic tissue post debridement no  open wounds, s/p amp 4th toe and partial 4th metatarsal, closed, no open wounds - healed  [de-identified] : Loss of light and sensation bilaterally [FreeTextEntry1] : 4th metatarsal amputation(healed) [de-identified] : KELLE shaved callous [FreeTextEntry7] : hallux plantar(closed) [de-identified] : No wound care product [TWNoteComboBox1] : Left [TWNoteComboBox6] : Surgical [TWNoteComboBox9] : Left [de-identified] : Diabetic

## 2024-04-25 NOTE — HISTORY OF PRESENT ILLNESS
[FreeTextEntry1] : Patient is 73 year old male presenting s/p left foot 4th digit and 4th metatarsal head resection for osteomyelitis with dehiscence to the central surgical site healed.  Patient has resumed regular activities.   Patient denies any pain to the left foot. Patient relates some dryness to the left foot and some discomfort in the area of the callus on the plantar 1st metatarsal head.

## 2024-04-28 DIAGNOSIS — E11.40 TYPE 2 DIABETES MELLITUS WITH DIABETIC NEUROPATHY, UNSPECIFIED: ICD-10-CM

## 2024-04-28 DIAGNOSIS — L84 CORNS AND CALLOSITIES: ICD-10-CM
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
## 2024-04-28 DIAGNOSIS — Z79.899 OTHER LONG TERM (CURRENT) DRUG THERAPY: ICD-10-CM

## 2024-04-28 DIAGNOSIS — T87.81 DEHISCENCE OF AMPUTATION STUMP: ICD-10-CM

## 2024-04-28 DIAGNOSIS — Z79.84 LONG TERM (CURRENT) USE OF ORAL HYPOGLYCEMIC DRUGS: ICD-10-CM

## 2024-04-28 DIAGNOSIS — Z79.890 HORMONE REPLACEMENT THERAPY: ICD-10-CM

## 2024-04-28 DIAGNOSIS — E03.9 HYPOTHYROIDISM, UNSPECIFIED: ICD-10-CM

## 2024-04-28 DIAGNOSIS — M86.672 OTHER CHRONIC OSTEOMYELITIS, LEFT ANKLE AND FOOT: ICD-10-CM

## 2024-04-28 DIAGNOSIS — E78.5 HYPERLIPIDEMIA, UNSPECIFIED: ICD-10-CM

## 2024-04-28 DIAGNOSIS — Z79.82 LONG TERM (CURRENT) USE OF ASPIRIN: ICD-10-CM

## 2024-04-28 DIAGNOSIS — E55.9 VITAMIN D DEFICIENCY, UNSPECIFIED: ICD-10-CM

## 2024-04-28 DIAGNOSIS — I10 ESSENTIAL (PRIMARY) HYPERTENSION: ICD-10-CM
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
## 2024-04-28 DIAGNOSIS — E11.69 TYPE 2 DIABETES MELLITUS WITH OTHER SPECIFIED COMPLICATION: ICD-10-CM

## 2024-04-28 DIAGNOSIS — Z89.422 ACQUIRED ABSENCE OF OTHER LEFT TOE(S): ICD-10-CM

## 2024-06-20 ENCOUNTER — APPOINTMENT (OUTPATIENT)
Dept: WOUND CARE | Facility: HOSPITAL | Age: 74
End: 2024-06-20
Payer: COMMERCIAL

## 2024-06-20 ENCOUNTER — OUTPATIENT (OUTPATIENT)
Dept: OUTPATIENT SERVICES | Facility: HOSPITAL | Age: 74
LOS: 1 days | Discharge: HOME | End: 2024-06-20
Payer: COMMERCIAL

## 2024-06-20 VITALS
BODY MASS INDEX: 25.05 KG/M2 | WEIGHT: 175 LBS | SYSTOLIC BLOOD PRESSURE: 165 MMHG | OXYGEN SATURATION: 98 % | DIASTOLIC BLOOD PRESSURE: 71 MMHG | RESPIRATION RATE: 18 BRPM | TEMPERATURE: 97.6 F | HEART RATE: 52 BPM | HEIGHT: 70 IN

## 2024-06-20 DIAGNOSIS — T87.81 DEHISCENCE OF AMPUTATION STUMP: ICD-10-CM

## 2024-06-20 PROCEDURE — 73630 X-RAY EXAM OF FOOT: CPT | Mod: 26,LT

## 2024-06-20 PROCEDURE — 99213 OFFICE O/P EST LOW 20 MIN: CPT

## 2024-06-20 PROCEDURE — G0463: CPT

## 2024-06-20 PROCEDURE — 73630 X-RAY EXAM OF FOOT: CPT

## 2024-06-23 NOTE — PHYSICAL EXAM
[4 x 4] : 4 x 4  [0] : left 0 [1+] : left 1+ [Ankle Swelling (On Exam)] : present [Ankle Swelling Bilaterally] : bilaterally  [Ankle Swelling On The Right] : mild [Varicose Veins Of Lower Extremities] : bilaterally [Alert] : alert [Oriented to Person] : oriented to person [Oriented to Place] : oriented to place [Oriented to Time] : oriented to time [Calm] : calm [] : not present [Stool Sample Taken] : No stool obtained  on rectal exam [Purpura] : no purpura  [Petechiae] : no petechiae [Skin Ulcer] : no ulcer [Skin Induration] : no induration [de-identified] : A&Ox3, NAD [de-identified] : HTN, HLD [de-identified] : 5 out of 5 strength in all quadrants bilaterally, s/p amp 4th toe and partial 4th metatarsal, hammer toes to 1-3 and varus 5th digit left foot  [de-identified] : Left foot plantar first metatarsal head woudn down to fat and fissure to the distal hallux,  [de-identified] : Loss of light and sensation bilaterally [FreeTextEntry1] : Hallux Fissure [FreeTextEntry2] : 0.2 [FreeTextEntry3] : 3.0 [FreeTextEntry4] : 0.1 [de-identified] : Serosanguinous  [de-identified] : Mild [de-identified] : Betadine, Silver Alginate [de-identified] :  Mechanically cleansed with NS 0.9%, Sterile Gauze [facility] Shoe [FreeTextEntry7] : hallux plantar [FreeTextEntry8] : 1.8 [FreeTextEntry9] : 4.2 [de-identified] : 0.2 [de-identified] : Serosanguinous [de-identified] : Betadine, Silver Alginate [de-identified] :  Mechanically cleansed with NS 0.9%, Sterile Gauze Kerx Surgical Shoe [TWNoteComboBox1] : Left [TWNoteComboBox4] : Small [TWNoteComboBox5] : No [TWNoteComboBox6] : Surgical [de-identified] : Macerated [de-identified] : No [de-identified] : None [de-identified] : None [de-identified] : 100% [de-identified] : 3x Weekly [de-identified] : Primary Dressing [TWNoteComboBox9] : Left [de-identified] : Moderate [de-identified] : No [de-identified] : Diabetic [de-identified] : No [de-identified] : Macerated [de-identified] : None [de-identified] : None [de-identified] : 100% [de-identified] : 3x Weekly [de-identified] : Primary Dressing

## 2024-06-23 NOTE — REVIEW OF SYSTEMS
[Arthralgias] : arthralgias [Joint Stiffness] : joint stiffness [Skin Wound] : skin wound [Negative] : Heme/Lymph [Fever] : no fever [Chills] : no chills [Eye Pain] : no eye pain [Earache] : no earache [Shortness Of Breath] : no shortness of breath [Wheezing] : no wheezing [Abdominal Pain] : no abdominal pain [Limb Weakness] : no limb weakness [Difficulty Walking] : no difficulty walking [Anxiety] : no anxiety [FreeTextEntry5] : HTN, HLD [FreeTextEntry9] : Swollen left foot  [de-identified] : s/p amp 4th toe and partial metatarsal , closed ; new wound to the plantar 1st metatarsal head down to fat and fissure to the distal hallux  [de-identified] : NIDDM with neuropathy  [de-identified] : NIDDM

## 2024-06-23 NOTE — HISTORY OF PRESENT ILLNESS
[FreeTextEntry1] : Patient is 73 year old male presenting with new wound to the distal hallux and plantar 1st metatarsal down to fat. Patient relates the wound has been present for about 2- 3 weeks. Patient was concerned and so visited the [facility]. Patient is neuropathic and does not complain of pain in the foot.

## 2024-06-23 NOTE — ASSESSMENT
[Verbal] : Verbal [Written] : Written [Patient] : Patient [Dressing changes] : dressing changes [Foot Care] : foot care [Skin Care] : skin care [Pressure relief] : pressure relief [Signs and symptoms of infection] : sign and symptoms of infection [How and When to Call] : how and when to call [Off-loading] : off-loading [Patient responsibility to plan of care] : patient responsibility to plan of care [Stable] : stable [Ambulatory] : Ambulatory [Not Applicable - Long Term Care/Home Health Agency] : Long Term Care/Home Health Agency: Not Applicable [Demo] : Demo [Fair - mild discomfort, physical impairment, low acceptance] : Fair - mild discomfort, physical impairment, low acceptance [Needs reinforcement] : needs reinforcement [Nutrition] : nutrition [Other: ____] : [unfilled] [] : No [FreeTextEntry2] : Infection prevention Wound care Nutrition and wound healing Elevation and low sodium compliance. Pressure relief/ Pressure redistribution Offloading the stress on skin structures and decreasing potential pathologic biomechanical influences.  [FreeTextEntry3] : DPM shaved callous to reveal open macerated area beneath [FreeTextEntry4] : Pt sent to [facility] for x-ray, Auth submitted for MRI, Vascular studies and consult Patients' wife will perform dressing changes, small amount of supplies provided. Advised the patient NOT to use hydrogen peroxide to cleanse the wounds as he was doing prior. Follow up in 1 Week

## 2024-06-23 NOTE — PLAN
[FreeTextEntry1] : .Patient seen and evaluated. Discussed etiology and treatment plan. Patient verbalized understanding. Ordered X- rays and MRI of the left foot to r/o OM or any osseous or soft tissue pathology. Vascular studies ordered and will request vascular consult. Will start wound care and offloading. Patient is at risk of developing more acute infections and even sepsis. Patient to return to clinic in one week. Spent 30 minutes for patient care and medical decision making.

## 2024-06-25 DIAGNOSIS — Z79.890 HORMONE REPLACEMENT THERAPY: ICD-10-CM

## 2024-06-25 DIAGNOSIS — E11.621 TYPE 2 DIABETES MELLITUS WITH FOOT ULCER: ICD-10-CM

## 2024-06-25 DIAGNOSIS — E55.9 VITAMIN D DEFICIENCY, UNSPECIFIED: ICD-10-CM

## 2024-06-25 DIAGNOSIS — Z79.82 LONG TERM (CURRENT) USE OF ASPIRIN: ICD-10-CM

## 2024-06-25 DIAGNOSIS — R23.4 CHANGES IN SKIN TEXTURE: ICD-10-CM

## 2024-06-25 DIAGNOSIS — E11.40 TYPE 2 DIABETES MELLITUS WITH DIABETIC NEUROPATHY, UNSPECIFIED: ICD-10-CM

## 2024-06-25 DIAGNOSIS — E03.9 HYPOTHYROIDISM, UNSPECIFIED: ICD-10-CM

## 2024-06-25 DIAGNOSIS — I10 ESSENTIAL (PRIMARY) HYPERTENSION: ICD-10-CM

## 2024-06-25 DIAGNOSIS — Z79.84 LONG TERM (CURRENT) USE OF ORAL HYPOGLYCEMIC DRUGS: ICD-10-CM

## 2024-06-25 DIAGNOSIS — Z79.899 OTHER LONG TERM (CURRENT) DRUG THERAPY: ICD-10-CM

## 2024-06-25 DIAGNOSIS — E78.5 HYPERLIPIDEMIA, UNSPECIFIED: ICD-10-CM

## 2024-06-25 DIAGNOSIS — Z89.422 ACQUIRED ABSENCE OF OTHER LEFT TOE(S): ICD-10-CM

## 2024-06-25 DIAGNOSIS — L97.522 NON-PRESSURE CHRONIC ULCER OF OTHER PART OF LEFT FOOT WITH FAT LAYER EXPOSED: ICD-10-CM

## 2024-06-27 ENCOUNTER — OUTPATIENT (OUTPATIENT)
Dept: OUTPATIENT SERVICES | Facility: HOSPITAL | Age: 74
LOS: 1 days | Discharge: HOME | End: 2024-06-27
Payer: COMMERCIAL

## 2024-06-27 ENCOUNTER — APPOINTMENT (OUTPATIENT)
Dept: WOUND CARE | Facility: HOSPITAL | Age: 74
End: 2024-06-27
Payer: COMMERCIAL

## 2024-06-27 VITALS
DIASTOLIC BLOOD PRESSURE: 70 MMHG | WEIGHT: 175 LBS | BODY MASS INDEX: 25.05 KG/M2 | TEMPERATURE: 98.1 F | HEART RATE: 49 BPM | SYSTOLIC BLOOD PRESSURE: 145 MMHG | RESPIRATION RATE: 18 BRPM | HEIGHT: 70 IN | OXYGEN SATURATION: 97 %

## 2024-06-27 DIAGNOSIS — T87.81 DEHISCENCE OF AMPUTATION STUMP: ICD-10-CM

## 2024-06-27 DIAGNOSIS — R23.4 CHANGES IN SKIN TEXTURE: ICD-10-CM

## 2024-06-27 DIAGNOSIS — L97.529 TYPE 2 DIABETES MELLITUS WITH FOOT ULCER: ICD-10-CM

## 2024-06-27 DIAGNOSIS — E11.621 TYPE 2 DIABETES MELLITUS WITH FOOT ULCER: ICD-10-CM

## 2024-06-27 DIAGNOSIS — L97.522 NON-PRESSURE CHRONIC ULCER OF OTHER PART OF LEFT FOOT WITH FAT LAYER EXPOSED: ICD-10-CM

## 2024-06-27 DIAGNOSIS — E11.40 TYPE 2 DIABETES MELLITUS WITH DIABETIC NEUROPATHY, UNSPECIFIED: ICD-10-CM

## 2024-06-27 DIAGNOSIS — Z89.422 ACQUIRED ABSENCE OF OTHER LEFT TOE(S): ICD-10-CM

## 2024-06-27 PROCEDURE — G0463: CPT

## 2024-06-27 PROCEDURE — 99213 OFFICE O/P EST LOW 20 MIN: CPT

## 2024-07-01 DIAGNOSIS — Z79.82 LONG TERM (CURRENT) USE OF ASPIRIN: ICD-10-CM

## 2024-07-01 DIAGNOSIS — E11.621 TYPE 2 DIABETES MELLITUS WITH FOOT ULCER: ICD-10-CM

## 2024-07-01 DIAGNOSIS — E55.9 VITAMIN D DEFICIENCY, UNSPECIFIED: ICD-10-CM

## 2024-07-01 DIAGNOSIS — Z89.422 ACQUIRED ABSENCE OF OTHER LEFT TOE(S): ICD-10-CM

## 2024-07-01 DIAGNOSIS — E03.9 HYPOTHYROIDISM, UNSPECIFIED: ICD-10-CM

## 2024-07-01 DIAGNOSIS — R23.4 CHANGES IN SKIN TEXTURE: ICD-10-CM

## 2024-07-01 DIAGNOSIS — Z98.84 BARIATRIC SURGERY STATUS: ICD-10-CM

## 2024-07-01 DIAGNOSIS — E11.40 TYPE 2 DIABETES MELLITUS WITH DIABETIC NEUROPATHY, UNSPECIFIED: ICD-10-CM

## 2024-07-01 DIAGNOSIS — E78.5 HYPERLIPIDEMIA, UNSPECIFIED: ICD-10-CM

## 2024-07-01 DIAGNOSIS — Z79.899 OTHER LONG TERM (CURRENT) DRUG THERAPY: ICD-10-CM

## 2024-07-01 DIAGNOSIS — I10 ESSENTIAL (PRIMARY) HYPERTENSION: ICD-10-CM

## 2024-07-01 DIAGNOSIS — L97.522 NON-PRESSURE CHRONIC ULCER OF OTHER PART OF LEFT FOOT WITH FAT LAYER EXPOSED: ICD-10-CM

## 2024-07-01 DIAGNOSIS — Z79.890 HORMONE REPLACEMENT THERAPY: ICD-10-CM

## 2024-07-02 ENCOUNTER — NON-APPOINTMENT (OUTPATIENT)
Age: 74
End: 2024-07-02

## 2024-07-05 ENCOUNTER — OUTPATIENT (OUTPATIENT)
Dept: OUTPATIENT SERVICES | Facility: HOSPITAL | Age: 74
LOS: 1 days | Discharge: HOME | End: 2024-07-05
Payer: COMMERCIAL

## 2024-07-05 ENCOUNTER — APPOINTMENT (OUTPATIENT)
Dept: WOUND CARE | Facility: HOSPITAL | Age: 74
End: 2024-07-05
Payer: COMMERCIAL

## 2024-07-05 VITALS
HEART RATE: 81 BPM | SYSTOLIC BLOOD PRESSURE: 147 MMHG | DIASTOLIC BLOOD PRESSURE: 72 MMHG | WEIGHT: 175 LBS | RESPIRATION RATE: 20 BRPM | BODY MASS INDEX: 25.05 KG/M2 | HEIGHT: 70 IN | TEMPERATURE: 98.3 F | OXYGEN SATURATION: 97 %

## 2024-07-05 DIAGNOSIS — T87.81 DEHISCENCE OF AMPUTATION STUMP: ICD-10-CM

## 2024-07-05 DIAGNOSIS — E11.40 TYPE 2 DIABETES MELLITUS WITH DIABETIC NEUROPATHY, UNSPECIFIED: ICD-10-CM

## 2024-07-05 PROCEDURE — G0463: CPT

## 2024-07-05 PROCEDURE — 99213 OFFICE O/P EST LOW 20 MIN: CPT

## 2024-07-08 DIAGNOSIS — E11.621 TYPE 2 DIABETES MELLITUS WITH FOOT ULCER: ICD-10-CM

## 2024-07-08 DIAGNOSIS — E78.5 HYPERLIPIDEMIA, UNSPECIFIED: ICD-10-CM

## 2024-07-08 DIAGNOSIS — R23.4 CHANGES IN SKIN TEXTURE: ICD-10-CM

## 2024-07-08 DIAGNOSIS — E55.9 VITAMIN D DEFICIENCY, UNSPECIFIED: ICD-10-CM

## 2024-07-08 DIAGNOSIS — Z79.82 LONG TERM (CURRENT) USE OF ASPIRIN: ICD-10-CM

## 2024-07-08 DIAGNOSIS — Z79.899 OTHER LONG TERM (CURRENT) DRUG THERAPY: ICD-10-CM

## 2024-07-08 DIAGNOSIS — Z89.422 ACQUIRED ABSENCE OF OTHER LEFT TOE(S): ICD-10-CM

## 2024-07-08 DIAGNOSIS — E03.9 HYPOTHYROIDISM, UNSPECIFIED: ICD-10-CM

## 2024-07-08 DIAGNOSIS — Z79.890 HORMONE REPLACEMENT THERAPY: ICD-10-CM

## 2024-07-08 DIAGNOSIS — I10 ESSENTIAL (PRIMARY) HYPERTENSION: ICD-10-CM

## 2024-07-08 DIAGNOSIS — Z79.84 LONG TERM (CURRENT) USE OF ORAL HYPOGLYCEMIC DRUGS: ICD-10-CM

## 2024-07-08 DIAGNOSIS — L97.522 NON-PRESSURE CHRONIC ULCER OF OTHER PART OF LEFT FOOT WITH FAT LAYER EXPOSED: ICD-10-CM

## 2024-07-08 DIAGNOSIS — E11.40 TYPE 2 DIABETES MELLITUS WITH DIABETIC NEUROPATHY, UNSPECIFIED: ICD-10-CM

## 2024-07-11 ENCOUNTER — OUTPATIENT (OUTPATIENT)
Dept: OUTPATIENT SERVICES | Facility: HOSPITAL | Age: 74
LOS: 1 days | Discharge: HOME | End: 2024-07-11
Payer: COMMERCIAL

## 2024-07-11 ENCOUNTER — APPOINTMENT (OUTPATIENT)
Dept: WOUND CARE | Facility: HOSPITAL | Age: 74
End: 2024-07-11
Payer: COMMERCIAL

## 2024-07-11 VITALS
TEMPERATURE: 97.8 F | OXYGEN SATURATION: 98 % | DIASTOLIC BLOOD PRESSURE: 70 MMHG | HEART RATE: 52 BPM | BODY MASS INDEX: 25.05 KG/M2 | RESPIRATION RATE: 18 BRPM | HEIGHT: 70 IN | SYSTOLIC BLOOD PRESSURE: 144 MMHG | WEIGHT: 175 LBS

## 2024-07-11 DIAGNOSIS — T87.81 DEHISCENCE OF AMPUTATION STUMP: ICD-10-CM

## 2024-07-11 PROCEDURE — G0463: CPT

## 2024-07-11 PROCEDURE — 99213 OFFICE O/P EST LOW 20 MIN: CPT

## 2024-07-15 DIAGNOSIS — E55.9 VITAMIN D DEFICIENCY, UNSPECIFIED: ICD-10-CM

## 2024-07-15 DIAGNOSIS — E11.621 TYPE 2 DIABETES MELLITUS WITH FOOT ULCER: ICD-10-CM

## 2024-07-15 DIAGNOSIS — Z79.890 HORMONE REPLACEMENT THERAPY: ICD-10-CM

## 2024-07-15 DIAGNOSIS — Z79.84 LONG TERM (CURRENT) USE OF ORAL HYPOGLYCEMIC DRUGS: ICD-10-CM

## 2024-07-15 DIAGNOSIS — L97.522 NON-PRESSURE CHRONIC ULCER OF OTHER PART OF LEFT FOOT WITH FAT LAYER EXPOSED: ICD-10-CM

## 2024-07-15 DIAGNOSIS — E11.40 TYPE 2 DIABETES MELLITUS WITH DIABETIC NEUROPATHY, UNSPECIFIED: ICD-10-CM

## 2024-07-15 DIAGNOSIS — I10 ESSENTIAL (PRIMARY) HYPERTENSION: ICD-10-CM

## 2024-07-15 DIAGNOSIS — R23.4 CHANGES IN SKIN TEXTURE: ICD-10-CM

## 2024-07-15 DIAGNOSIS — Z79.899 OTHER LONG TERM (CURRENT) DRUG THERAPY: ICD-10-CM

## 2024-07-15 DIAGNOSIS — E03.9 HYPOTHYROIDISM, UNSPECIFIED: ICD-10-CM

## 2024-07-15 DIAGNOSIS — Z79.82 LONG TERM (CURRENT) USE OF ASPIRIN: ICD-10-CM

## 2024-07-15 DIAGNOSIS — E78.5 HYPERLIPIDEMIA, UNSPECIFIED: ICD-10-CM

## 2024-07-15 DIAGNOSIS — Z89.422 ACQUIRED ABSENCE OF OTHER LEFT TOE(S): ICD-10-CM

## 2024-07-18 ENCOUNTER — OUTPATIENT (OUTPATIENT)
Dept: OUTPATIENT SERVICES | Facility: HOSPITAL | Age: 74
LOS: 1 days | Discharge: HOME | End: 2024-07-18
Payer: COMMERCIAL

## 2024-07-18 ENCOUNTER — APPOINTMENT (OUTPATIENT)
Dept: WOUND CARE | Facility: HOSPITAL | Age: 74
End: 2024-07-18
Payer: COMMERCIAL

## 2024-07-18 VITALS
OXYGEN SATURATION: 98 % | WEIGHT: 175 LBS | DIASTOLIC BLOOD PRESSURE: 79 MMHG | RESPIRATION RATE: 20 BRPM | BODY MASS INDEX: 25.05 KG/M2 | HEIGHT: 70 IN | HEART RATE: 47 BPM | TEMPERATURE: 98 F | SYSTOLIC BLOOD PRESSURE: 154 MMHG

## 2024-07-18 DIAGNOSIS — T87.89 OTHER COMPLICATIONS OF AMPUTATION STUMP: ICD-10-CM

## 2024-07-18 PROCEDURE — 99213 OFFICE O/P EST LOW 20 MIN: CPT

## 2024-07-19 ENCOUNTER — OUTPATIENT (OUTPATIENT)
Dept: OUTPATIENT SERVICES | Facility: HOSPITAL | Age: 74
LOS: 1 days | End: 2024-07-19
Payer: COMMERCIAL

## 2024-07-19 DIAGNOSIS — E11.621 TYPE 2 DIABETES MELLITUS WITH FOOT ULCER: ICD-10-CM

## 2024-07-19 PROCEDURE — G0463: CPT

## 2024-07-19 PROCEDURE — 93923 UPR/LXTR ART STDY 3+ LVLS: CPT

## 2024-07-19 PROCEDURE — 73718 MRI LOWER EXTREMITY W/O DYE: CPT | Mod: 26,LT

## 2024-07-19 PROCEDURE — 73718 MRI LOWER EXTREMITY W/O DYE: CPT

## 2024-07-19 PROCEDURE — 93923 UPR/LXTR ART STDY 3+ LVLS: CPT | Mod: 26

## 2024-07-22 DIAGNOSIS — Z79.82 LONG TERM (CURRENT) USE OF ASPIRIN: ICD-10-CM

## 2024-07-22 DIAGNOSIS — E11.40 TYPE 2 DIABETES MELLITUS WITH DIABETIC NEUROPATHY, UNSPECIFIED: ICD-10-CM

## 2024-07-22 DIAGNOSIS — L97.522 NON-PRESSURE CHRONIC ULCER OF OTHER PART OF LEFT FOOT WITH FAT LAYER EXPOSED: ICD-10-CM

## 2024-07-22 DIAGNOSIS — Z79.899 OTHER LONG TERM (CURRENT) DRUG THERAPY: ICD-10-CM
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
## 2024-07-22 DIAGNOSIS — I10 ESSENTIAL (PRIMARY) HYPERTENSION: ICD-10-CM

## 2024-07-22 DIAGNOSIS — E11.621 TYPE 2 DIABETES MELLITUS WITH FOOT ULCER: ICD-10-CM

## 2024-07-22 DIAGNOSIS — Z79.890 HORMONE REPLACEMENT THERAPY: ICD-10-CM

## 2024-07-22 DIAGNOSIS — E03.9 HYPOTHYROIDISM, UNSPECIFIED: ICD-10-CM

## 2024-07-22 DIAGNOSIS — T81.31XD DISRUPTION OF EXTERNAL OPERATION (SURGICAL) WOUND, NOT ELSEWHERE CLASSIFIED, SUBSEQUENT ENCOUNTER: ICD-10-CM
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
## 2024-07-22 DIAGNOSIS — Z79.84 LONG TERM (CURRENT) USE OF ORAL HYPOGLYCEMIC DRUGS: ICD-10-CM

## 2024-07-22 DIAGNOSIS — R23.4 CHANGES IN SKIN TEXTURE: ICD-10-CM

## 2024-07-22 DIAGNOSIS — E55.9 VITAMIN D DEFICIENCY, UNSPECIFIED: ICD-10-CM

## 2024-07-22 DIAGNOSIS — Z89.422 ACQUIRED ABSENCE OF OTHER LEFT TOE(S): ICD-10-CM

## 2024-07-22 DIAGNOSIS — E78.5 HYPERLIPIDEMIA, UNSPECIFIED: ICD-10-CM

## 2024-07-25 ENCOUNTER — APPOINTMENT (OUTPATIENT)
Dept: WOUND CARE | Facility: HOSPITAL | Age: 74
End: 2024-07-25
Payer: COMMERCIAL

## 2024-07-25 VITALS
RESPIRATION RATE: 18 BRPM | SYSTOLIC BLOOD PRESSURE: 146 MMHG | HEIGHT: 70 IN | HEART RATE: 51 BPM | DIASTOLIC BLOOD PRESSURE: 72 MMHG | TEMPERATURE: 98.2 F | WEIGHT: 175 LBS | BODY MASS INDEX: 25.05 KG/M2 | OXYGEN SATURATION: 97 %

## 2024-07-25 PROCEDURE — 99213 OFFICE O/P EST LOW 20 MIN: CPT

## 2024-07-28 NOTE — HISTORY OF PRESENT ILLNESS
[FreeTextEntry1] : Patient is 73 year old male presenting for f/u for left foot plantar 1st metatarsal head diabetic ulcer down to fat and fissure to the distal great toe - noted with healing . Patient has neuropathy and denies any pain to the left foot. Patient relates has  obtained  MRI and vascular studies.

## 2024-07-28 NOTE — REVIEW OF SYSTEMS
[Fever] : no fever [Chills] : no chills [Eye Pain] : no eye pain [Earache] : no earache [Shortness Of Breath] : no shortness of breath [Wheezing] : no wheezing [Abdominal Pain] : no abdominal pain [Arthralgias] : arthralgias [Joint Stiffness] : joint stiffness [Skin Wound] : skin wound [Limb Weakness] : no limb weakness [Difficulty Walking] : no difficulty walking [Anxiety] : no anxiety [Negative] : Heme/Lymph [FreeTextEntry5] : HTN, HLD [FreeTextEntry9] : Swollen left foot  [de-identified] : s/p amp 4th toe and partial metatarsal , closed ; new wound to the plantar 1st metatarsal head down to fat and fissure to the distal hallux  [de-identified] : NIDDM with neuropathy  [de-identified] : NIDDM

## 2024-07-28 NOTE — ASSESSMENT
[Verbal] : Verbal [Written] : Written [Demo] : Demo [Patient] : Patient [Fair - mild discomfort, physical impairment, low acceptance] : Fair - mild discomfort, physical impairment, low acceptance [Verbalizes knowledge/Understanding] : Verbalizes knowledge/understanding [Dressing changes] : dressing changes [Foot Care] : foot care [Skin Care] : skin care [Pressure relief] : pressure relief [Signs and symptoms of infection] : sign and symptoms of infection [Nutrition] : nutrition [How and When to Call] : how and when to call [Labs and Tests] : labs and tests [Off-loading] : off-loading [Patient responsibility to plan of care] : patient responsibility to plan of care [Glycemic Control] : glycemic control [Stable] : stable [Home] : Home [Ambulatory] : Ambulatory [Not Applicable - Long Term Care/Home Health Agency] : Long Term Care/Home Health Agency: Not Applicable [] : No [FreeTextEntry2] : Infection Prevention Wound care and Dressing changes Promote and Restore optimal skin integrity Nutrition and Wound Healing  Offloading and Pressure relief Protect and Guard wound site  Maintain acceptable levels of Pain Compliance R/T Elevation of Lower Extremities [FreeTextEntry4] : DPM reviewed MRI results with patient, DPM discussed vascular studies with Dr Torres no further intervention needed at this time. Patients' wife will perform dressing changes, no supplies needed Patient to return to [facility] in One week.

## 2024-07-28 NOTE — ASSESSMENT
[Verbal] : Verbal [Written] : Written [Demo] : Demo [Patient] : Patient [Fair - mild discomfort, physical impairment, low acceptance] : Fair - mild discomfort, physical impairment, low acceptance [Verbalizes knowledge/Understanding] : Verbalizes knowledge/understanding [Dressing changes] : dressing changes [Foot Care] : foot care [Skin Care] : skin care [Pressure relief] : pressure relief [Signs and symptoms of infection] : sign and symptoms of infection [Nutrition] : nutrition [How and When to Call] : how and when to call [Labs and Tests] : labs and tests [Off-loading] : off-loading [Patient responsibility to plan of care] : patient responsibility to plan of care [Glycemic Control] : glycemic control [Stable] : stable [Home] : Home [Ambulatory] : Ambulatory [Not Applicable - Long Term Care/Home Health Agency] : Long Term Care/Home Health Agency: Not Applicable [] : No [FreeTextEntry2] : Infection Prevention Wound care and Dressing changes Promote and Restore optimal skin integrity Nutrition and Wound Healing  Offloading and Pressure relief Protect and Guard wound site  Maintain acceptable levels of Pain Compliance R/T Elevation of Lower Extremities [FreeTextEntry4] : DPM reviewed MRI results with patient, DPM discussed vascular studies with Dr Torres no further intervention needed at this time. Patients' wife will perform dressing changes, no supplies needed Patient to return to [facility] in One week.

## 2024-07-28 NOTE — REVIEW OF SYSTEMS
[Fever] : no fever [Chills] : no chills [Eye Pain] : no eye pain [Earache] : no earache [Shortness Of Breath] : no shortness of breath [Wheezing] : no wheezing [Abdominal Pain] : no abdominal pain [Arthralgias] : arthralgias [Joint Stiffness] : joint stiffness [Skin Wound] : skin wound [Limb Weakness] : no limb weakness [Difficulty Walking] : no difficulty walking [Anxiety] : no anxiety [Negative] : Heme/Lymph [FreeTextEntry9] : Swollen left foot  [FreeTextEntry5] : HTN, HLD [de-identified] : s/p amp 4th toe and partial metatarsal , closed ; new wound to the plantar 1st metatarsal head down to fat and fissure to the distal hallux  [de-identified] : NIDDM with neuropathy  [de-identified] : NIDDM

## 2024-07-28 NOTE — PHYSICAL EXAM
[2 x 2] : 2 x 2  [0] : left 0 [1+] : left 1+ [Ankle Swelling (On Exam)] : present [Ankle Swelling Bilaterally] : bilaterally  [Varicose Veins Of Lower Extremities] : bilaterally [Ankle Swelling On The Right] : mild [] : bilaterally [Stool Sample Taken] : No stool obtained  on rectal exam [Purpura] : no purpura  [Petechiae] : no petechiae [Skin Ulcer] : no ulcer [Skin Induration] : no induration [Alert] : alert [Oriented to Person] : oriented to person [Oriented to Place] : oriented to place [Oriented to Time] : oriented to time [Calm] : calm [de-identified] : A&Ox3, NAD [de-identified] : HTN, HLD [de-identified] : 5 out of 5 strength in all quadrants bilaterally, s/p amp 4th toe and partial 4th metatarsal, hammer toes to 1-3 and varus 5th digit left foot  [de-identified] : Left foot plantar first metatarsal head wound down to fat - noted with progress of healing, granular wound base and fissure to the distal hallux- healed   [de-identified] : Loss of light and sensation bilaterally [FreeTextEntry1] : Left Hallux Fissure-Closed [de-identified] : No Product [FreeTextEntry7] : Left Hallux Plantar [FreeTextEntry8] : 0.1 [FreeTextEntry9] : 0.2 [de-identified] : 0.1 [de-identified] : Alginate  Ag [de-identified] : Mechanically cleansed with 0.9% Normal Saline Cloth Tape Surgical Shoe [TWNoteComboBox4] : None [TWNoteComboBox5] : No [TWNoteComboBox6] : Surgical [de-identified] : No [de-identified] : Normal [de-identified] : None [de-identified] : None [de-identified] : None [de-identified] : No [de-identified] : Diabetic [de-identified] : No [de-identified] : Normal [de-identified] : None [de-identified] : 100% [de-identified] : None [de-identified] : 3x Weekly [de-identified] : Primary Dressing

## 2024-07-28 NOTE — PLAN
[FreeTextEntry1] : .Patient seen and evaluated. Discussed etiology and treatment plan. Patient verbalized understanding.  Discussed MRI - no significant findings, no OM. Discussed vascular studies with Dr. Washburn who stated the vascular studies are WNL and no surgical intervention needed at this time. Will c/w local wound care and offloading. RTC in one week.  Spent 20 minutes for patient care and medical decision making.

## 2024-08-01 ENCOUNTER — OUTPATIENT (OUTPATIENT)
Dept: OUTPATIENT SERVICES | Facility: HOSPITAL | Age: 74
LOS: 1 days | Discharge: HOME | End: 2024-08-01
Payer: COMMERCIAL

## 2024-08-01 ENCOUNTER — APPOINTMENT (OUTPATIENT)
Dept: WOUND CARE | Facility: HOSPITAL | Age: 74
End: 2024-08-01
Payer: COMMERCIAL

## 2024-08-01 VITALS
SYSTOLIC BLOOD PRESSURE: 144 MMHG | BODY MASS INDEX: 25.05 KG/M2 | HEART RATE: 51 BPM | DIASTOLIC BLOOD PRESSURE: 69 MMHG | RESPIRATION RATE: 20 BRPM | WEIGHT: 175 LBS | OXYGEN SATURATION: 97 % | TEMPERATURE: 98 F | HEIGHT: 70 IN

## 2024-08-01 DIAGNOSIS — T87.89 OTHER COMPLICATIONS OF AMPUTATION STUMP: ICD-10-CM

## 2024-08-01 PROCEDURE — 99213 OFFICE O/P EST LOW 20 MIN: CPT

## 2024-08-01 PROCEDURE — G0463: CPT

## 2024-08-04 NOTE — ASSESSMENT
[Verbal] : Verbal [Written] : Written [Demo] : Demo [Patient] : Patient [Fair - mild discomfort, physical impairment, low acceptance] : Fair - mild discomfort, physical impairment, low acceptance [Verbalizes knowledge/Understanding] : Verbalizes knowledge/understanding [Dressing changes] : dressing changes [Foot Care] : foot care [Skin Care] : skin care [Pressure relief] : pressure relief [Signs and symptoms of infection] : sign and symptoms of infection [Nutrition] : nutrition [How and When to Call] : how and when to call [Labs and Tests] : labs and tests [Off-loading] : off-loading [Patient responsibility to plan of care] : patient responsibility to plan of care [Glycemic Control] : glycemic control [Stable] : stable [Home] : Home [Ambulatory] : Ambulatory [Not Applicable - Long Term Care/Home Health Agency] : Long Term Care/Home Health Agency: Not Applicable [] : No [FreeTextEntry2] : Infection Prevention Wound care and Dressing changes Promote and Restore optimal skin integrity Nutrition and Wound Healing  Offloading and Pressure relief Protect and Guard wound site  Maintain acceptable levels of Pain Compliance R/T Elevation of Lower Extremities [FreeTextEntry4] : Patients' wife will perform dressing changes, no supplies needed Patient to return to [facility] in One week.

## 2024-08-04 NOTE — PHYSICAL EXAM
[2 x 2] : 2 x 2  [0] : left 0 [1+] : left 1+ [Ankle Swelling (On Exam)] : present [Ankle Swelling Bilaterally] : bilaterally  [Varicose Veins Of Lower Extremities] : bilaterally [Ankle Swelling On The Right] : mild [] : not present [Stool Sample Taken] : No stool obtained  on rectal exam [Purpura] : no purpura  [Petechiae] : no petechiae [Skin Ulcer] : no ulcer [Skin Induration] : no induration [Alert] : alert [Oriented to Person] : oriented to person [Oriented to Time] : oriented to time [Oriented to Place] : oriented to place [Calm] : calm [de-identified] : A&Ox3, NAD [de-identified] : HTN, HLD [de-identified] : Left foot plantar first metatarsal head wound down to fat - noted with progress of healing, granular wound base and fissure to the distal hallux- healed   [de-identified] : 5 out of 5 strength in all quadrants bilaterally, s/p amp 4th toe and partial 4th metatarsal, hammer toes to 1-3 and varus 5th digit left foot  [de-identified] : Loss of light and sensation bilaterally [FreeTextEntry1] : Left Hallux Fissure-Closed [de-identified] : No Product [FreeTextEntry7] : Left Hallux Plantar [FreeTextEntry8] : 0.1 [FreeTextEntry9] : 0.2 [de-identified] : 0.2 [de-identified] : Yun [de-identified] : Mechanically cleansed with 0.9% Normal Saline Cloth Tape Surgical Shoe [TWNoteComboBox4] : None [TWNoteComboBox5] : No [TWNoteComboBox6] : Surgical [de-identified] : No [de-identified] : Normal [de-identified] : None [de-identified] : None [de-identified] : None [de-identified] : No [de-identified] : Diabetic [de-identified] : Normal [de-identified] : No [de-identified] : None [de-identified] : None [de-identified] : 100% [de-identified] : 3x Weekly [de-identified] : Primary Dressing

## 2024-08-04 NOTE — REVIEW OF SYSTEMS
[Fever] : no fever [Chills] : no chills [Eye Pain] : no eye pain [Earache] : no earache [Shortness Of Breath] : no shortness of breath [Wheezing] : no wheezing [Abdominal Pain] : no abdominal pain [Arthralgias] : arthralgias [Skin Wound] : skin wound [Joint Stiffness] : joint stiffness [Limb Weakness] : no limb weakness [Difficulty Walking] : no difficulty walking [Anxiety] : no anxiety [Negative] : Heme/Lymph [FreeTextEntry9] : Swollen left foot  [FreeTextEntry5] : HTN, HLD [de-identified] : s/p amp 4th toe and partial metatarsal , closed ; new wound to the plantar 1st metatarsal head down to fat and fissure to the distal hallux  [de-identified] : NIDDM with neuropathy  [de-identified] : NIDDM

## 2024-08-04 NOTE — REVIEW OF SYSTEMS
[Fever] : no fever [Chills] : no chills [Eye Pain] : no eye pain [Earache] : no earache [Shortness Of Breath] : no shortness of breath [Wheezing] : no wheezing [Abdominal Pain] : no abdominal pain [Arthralgias] : arthralgias [Joint Stiffness] : joint stiffness [Skin Wound] : skin wound [Limb Weakness] : no limb weakness [Difficulty Walking] : no difficulty walking [Anxiety] : no anxiety [Negative] : Heme/Lymph [FreeTextEntry5] : HTN, HLD [FreeTextEntry9] : Swollen left foot  [de-identified] : s/p amp 4th toe and partial metatarsal , closed ; new wound to the plantar 1st metatarsal head down to fat and fissure to the distal hallux  [de-identified] : NIDDM with neuropathy  [de-identified] : NIDDM

## 2024-08-04 NOTE — PLAN
[FreeTextEntry1] : .Patient seen and evaluated. Discussed etiology and treatment plan. Patient verbalized understanding.  Discussed MRI - no significant findings, no OM c/w local wound care and offloading. RTC in one week.  Spent 20 minutes for patient care and medical decision making.

## 2024-08-04 NOTE — HISTORY OF PRESENT ILLNESS
[FreeTextEntry1] : Patient is 73 year old male presenting for f/u for left foot plantar 1st metatarsal head diabetic ulcer down to fat - noted with healing and fissure to the distal great toe - healed. Patient has neuropathy and denies any pain to the left foot. Patient relates has  obtained  MRI and vascular studies.

## 2024-08-04 NOTE — PHYSICAL EXAM
[2 x 2] : 2 x 2  [0] : left 0 [1+] : left 1+ [Ankle Swelling (On Exam)] : present [Ankle Swelling Bilaterally] : bilaterally  [Varicose Veins Of Lower Extremities] : bilaterally [Ankle Swelling On The Right] : mild [] : bilaterally [Stool Sample Taken] : No stool obtained  on rectal exam [Purpura] : no purpura  [Petechiae] : no petechiae [Skin Ulcer] : no ulcer [Skin Induration] : no induration [Alert] : alert [Oriented to Person] : oriented to person [Oriented to Place] : oriented to place [Oriented to Time] : oriented to time [Calm] : calm [de-identified] : A&Ox3, NAD [de-identified] : HTN, HLD [de-identified] : Left foot plantar first metatarsal head wound down to fat - noted with progress of healing, granular wound base and fissure to the distal hallux- healed   [de-identified] : 5 out of 5 strength in all quadrants bilaterally, s/p amp 4th toe and partial 4th metatarsal, hammer toes to 1-3 and varus 5th digit left foot  [de-identified] : Loss of light and sensation bilaterally [FreeTextEntry1] : Left Hallux Fissure-Closed [de-identified] : No Product [FreeTextEntry7] : Left Hallux Plantar [FreeTextEntry8] : 0.1 [FreeTextEntry9] : 0.2 [de-identified] : 0.2 [de-identified] : Yun [de-identified] : Mechanically cleansed with 0.9% Normal Saline Cloth Tape Surgical Shoe [TWNoteComboBox4] : None [TWNoteComboBox5] : No [TWNoteComboBox6] : Surgical [de-identified] : No [de-identified] : Normal [de-identified] : None [de-identified] : None [de-identified] : None [de-identified] : No [de-identified] : Diabetic [de-identified] : Normal [de-identified] : No [de-identified] : None [de-identified] : None [de-identified] : 100% [de-identified] : 3x Weekly [de-identified] : Primary Dressing

## 2024-08-04 NOTE — ASSESSMENT
[Verbal] : Verbal [Written] : Written [Demo] : Demo [Patient] : Patient [Fair - mild discomfort, physical impairment, low acceptance] : Fair - mild discomfort, physical impairment, low acceptance [Verbalizes knowledge/Understanding] : Verbalizes knowledge/understanding [Dressing changes] : dressing changes [Foot Care] : foot care [Skin Care] : skin care [Pressure relief] : pressure relief [Signs and symptoms of infection] : sign and symptoms of infection [Nutrition] : nutrition [How and When to Call] : how and when to call [Labs and Tests] : labs and tests [Off-loading] : off-loading [Patient responsibility to plan of care] : patient responsibility to plan of care [Glycemic Control] : glycemic control [Stable] : stable [Home] : Home [Ambulatory] : Ambulatory [Not Applicable - Long Term Care/Home Health Agency] : Long Term Care/Home Health Agency: Not Applicable [] : No [FreeTextEntry2] : Infection Prevention Wound care and Dressing changes Promote and Restore optimal skin integrity Nutrition and Wound Healing  Offloading and Pressure relief Protect and Guard wound site  Maintain acceptable levels of Pain Compliance R/T Elevation of Lower Extremities [FreeTextEntry4] : Patients' wife will perform dressing changes, no supplies needed Patient to return to [facility] in One week.

## 2024-08-05 DIAGNOSIS — E03.9 HYPOTHYROIDISM, UNSPECIFIED: ICD-10-CM

## 2024-08-05 DIAGNOSIS — Z79.890 HORMONE REPLACEMENT THERAPY: ICD-10-CM

## 2024-08-05 DIAGNOSIS — I10 ESSENTIAL (PRIMARY) HYPERTENSION: ICD-10-CM

## 2024-08-05 DIAGNOSIS — Z79.82 LONG TERM (CURRENT) USE OF ASPIRIN: ICD-10-CM

## 2024-08-05 DIAGNOSIS — E11.621 TYPE 2 DIABETES MELLITUS WITH FOOT ULCER: ICD-10-CM

## 2024-08-05 DIAGNOSIS — R23.4 CHANGES IN SKIN TEXTURE: ICD-10-CM

## 2024-08-05 DIAGNOSIS — E11.40 TYPE 2 DIABETES MELLITUS WITH DIABETIC NEUROPATHY, UNSPECIFIED: ICD-10-CM

## 2024-08-05 DIAGNOSIS — L97.522 NON-PRESSURE CHRONIC ULCER OF OTHER PART OF LEFT FOOT WITH FAT LAYER EXPOSED: ICD-10-CM

## 2024-08-05 DIAGNOSIS — Z89.422 ACQUIRED ABSENCE OF OTHER LEFT TOE(S): ICD-10-CM

## 2024-08-05 DIAGNOSIS — E55.9 VITAMIN D DEFICIENCY, UNSPECIFIED: ICD-10-CM

## 2024-08-05 DIAGNOSIS — Z79.84 LONG TERM (CURRENT) USE OF ORAL HYPOGLYCEMIC DRUGS: ICD-10-CM

## 2024-08-05 DIAGNOSIS — E78.5 HYPERLIPIDEMIA, UNSPECIFIED: ICD-10-CM

## 2024-08-05 DIAGNOSIS — Z79.899 OTHER LONG TERM (CURRENT) DRUG THERAPY: ICD-10-CM

## 2024-08-08 ENCOUNTER — APPOINTMENT (OUTPATIENT)
Dept: WOUND CARE | Facility: HOSPITAL | Age: 74
End: 2024-08-08

## 2024-08-08 ENCOUNTER — OUTPATIENT (OUTPATIENT)
Dept: OUTPATIENT SERVICES | Facility: HOSPITAL | Age: 74
LOS: 1 days | Discharge: HOME | End: 2024-08-08
Payer: COMMERCIAL

## 2024-08-08 DIAGNOSIS — T87.89 OTHER COMPLICATIONS OF AMPUTATION STUMP: ICD-10-CM

## 2024-08-08 PROCEDURE — 99213 OFFICE O/P EST LOW 20 MIN: CPT

## 2024-08-08 PROCEDURE — G0463: CPT

## 2024-08-08 NOTE — PHYSICAL EXAM
[2 x 2] : 2 x 2  [0] : left 0 [1+] : left 1+ [Ankle Swelling (On Exam)] : present [Ankle Swelling Bilaterally] : bilaterally  [Varicose Veins Of Lower Extremities] : bilaterally [Ankle Swelling On The Right] : mild [] : bilaterally [Stool Sample Taken] : No stool obtained  on rectal exam [Purpura] : no purpura  [Petechiae] : no petechiae [Skin Ulcer] : no ulcer [Skin Induration] : no induration [Alert] : alert [Oriented to Person] : oriented to person [Oriented to Place] : oriented to place [Oriented to Time] : oriented to time [Calm] : calm [de-identified] : A&Ox3, NAD [de-identified] : HTN, HLD [de-identified] : 5 out of 5 strength in all quadrants bilaterally, s/p amp 4th toe and partial 4th metatarsal, hammer toes to 1-3 and varus 5th digit left foot  [de-identified] : Left foot plantar first metatarsal head wound down to fat - noted with progress of healing, granular wound base and fissure to the distal hallux- healed   [de-identified] : Loss of light and sensation bilaterally [FreeTextEntry1] : Left Hallux Fissure-Closed [de-identified] : No Product [FreeTextEntry7] : Left Hallux Plantar [FreeTextEntry8] : 0.1 [FreeTextEntry9] : 0.1 [de-identified] : 0.2 [de-identified] : Mild [de-identified] : Iodosorb, Alginate [de-identified] : Mechanically cleansed with 0.9% Normal Saline Allevyn Pad (Donut) Cloth Tape  [TWNoteComboBox4] : None [TWNoteComboBox5] : No [TWNoteComboBox6] : Surgical [de-identified] : No [de-identified] : Normal [de-identified] : None [de-identified] : None [de-identified] : None [de-identified] : No [de-identified] : Diabetic [de-identified] : No [de-identified] : Macerated [de-identified] : None [de-identified] : None [de-identified] : 100% [de-identified] : 3x Weekly [de-identified] : Primary Dressing

## 2024-08-08 NOTE — REVIEW OF SYSTEMS
[Fever] : no fever [Chills] : no chills [Eye Pain] : no eye pain [Earache] : no earache [Shortness Of Breath] : no shortness of breath [Wheezing] : no wheezing [Abdominal Pain] : no abdominal pain [Arthralgias] : arthralgias [Joint Stiffness] : joint stiffness [Skin Wound] : skin wound [Limb Weakness] : no limb weakness [Difficulty Walking] : no difficulty walking [Anxiety] : no anxiety [Negative] : Heme/Lymph [FreeTextEntry5] : HTN, HLD [FreeTextEntry9] : Swollen left foot  [de-identified] : s/p amp 4th toe and partial metatarsal , closed ; new wound to the plantar 1st metatarsal head down to fat and fissure to the distal hallux  [de-identified] : NIDDM with neuropathy  [de-identified] : NIDDM

## 2024-08-08 NOTE — HISTORY OF PRESENT ILLNESS
[FreeTextEntry1] : Patient is 73 year old male presenting for f/u for left foot plantar 1st metatarsal head diabetic ulcer down to fat - noted with healing and fissure to the distal great toe - healed. Patient has neuropathy and denies any pain to the left foot.

## 2024-08-08 NOTE — PLAN
[FreeTextEntry1] : .Patient seen and evaluated. Discussed etiology and treatment plan. Patient verbalized understanding.  Discussed with patient to start gym activity with offloading pad to the area of the wound. C/w local wound care and offloading.  RTC in one week.  Spent 20 minutes for patient care and medical decision making.

## 2024-08-08 NOTE — ASSESSMENT
[Verbal] : Verbal [Written] : Written [Demo] : Demo [Patient] : Patient [Fair - mild discomfort, physical impairment, low acceptance] : Fair - mild discomfort, physical impairment, low acceptance [Verbalizes knowledge/Understanding] : Verbalizes knowledge/understanding [Dressing changes] : dressing changes [Foot Care] : foot care [Skin Care] : skin care [Pressure relief] : pressure relief [Signs and symptoms of infection] : sign and symptoms of infection [Nutrition] : nutrition [How and When to Call] : how and when to call [Labs and Tests] : labs and tests [Off-loading] : off-loading [Patient responsibility to plan of care] : patient responsibility to plan of care [Glycemic Control] : glycemic control [Stable] : stable [Home] : Home [Ambulatory] : Ambulatory [Not Applicable - Long Term Care/Home Health Agency] : Long Term Care/Home Health Agency: Not Applicable [] : No [FreeTextEntry2] : Infection Prevention Wound care and Dressing changes Promote and Restore optimal skin integrity Nutrition and Wound Healing  Offloading and Pressure relief Protect and Guard wound site  Maintain acceptable levels of Pain Compliance R/T Elevation of Lower Extremities [FreeTextEntry4] : Patients' wife will perform dressing changes, no supplies needed Patient to return to [facility] in One week.

## 2024-08-15 ENCOUNTER — APPOINTMENT (OUTPATIENT)
Dept: WOUND CARE | Facility: HOSPITAL | Age: 74
End: 2024-08-15
Payer: COMMERCIAL

## 2024-08-15 VITALS
OXYGEN SATURATION: 98 % | DIASTOLIC BLOOD PRESSURE: 74 MMHG | SYSTOLIC BLOOD PRESSURE: 175 MMHG | BODY MASS INDEX: 25.05 KG/M2 | RESPIRATION RATE: 18 BRPM | HEART RATE: 44 BPM | TEMPERATURE: 98.1 F | WEIGHT: 175 LBS | HEIGHT: 70 IN

## 2024-08-15 DIAGNOSIS — E11.621 TYPE 2 DIABETES MELLITUS WITH FOOT ULCER: ICD-10-CM

## 2024-08-15 DIAGNOSIS — R23.4 CHANGES IN SKIN TEXTURE: ICD-10-CM

## 2024-08-15 DIAGNOSIS — Z89.422 ACQUIRED ABSENCE OF OTHER LEFT TOE(S): ICD-10-CM

## 2024-08-15 DIAGNOSIS — L97.522 NON-PRESSURE CHRONIC ULCER OF OTHER PART OF LEFT FOOT WITH FAT LAYER EXPOSED: ICD-10-CM

## 2024-08-15 DIAGNOSIS — L97.529 TYPE 2 DIABETES MELLITUS WITH FOOT ULCER: ICD-10-CM

## 2024-08-15 PROCEDURE — 99213 OFFICE O/P EST LOW 20 MIN: CPT

## 2024-08-19 NOTE — ASSESSMENT
[Verbal] : Verbal [Written] : Written [Demo] : Demo [Patient] : Patient [Fair - mild discomfort, physical impairment, low acceptance] : Fair - mild discomfort, physical impairment, low acceptance [Verbalizes knowledge/Understanding] : Verbalizes knowledge/understanding [Dressing changes] : dressing changes [Foot Care] : foot care [Skin Care] : skin care [Pressure relief] : pressure relief [Signs and symptoms of infection] : sign and symptoms of infection [Nutrition] : nutrition [How and When to Call] : how and when to call [Labs and Tests] : labs and tests [Off-loading] : off-loading [Patient responsibility to plan of care] : patient responsibility to plan of care [Glycemic Control] : glycemic control [Stable] : stable [Home] : Home [Ambulatory] : Ambulatory [Not Applicable - Long Term Care/Home Health Agency] : Long Term Care/Home Health Agency: Not Applicable [] : No [FreeTextEntry2] : Infection Prevention Wound care and Dressing changes Promote and Restore optimal skin integrity Nutrition and Wound Healing  Offloading and Pressure relief Protect and Guard wound site  Maintain acceptable levels of Pain Compliance R/T Elevation of Lower Extremities [FreeTextEntry4] : Patients' wife will perform dressing changes, no supplies needed Patient to return to [facility] in 2 weeks

## 2024-08-19 NOTE — PLAN
[FreeTextEntry1] : .Patient seen and evaluated. Discussed etiology and treatment plan. Patient verbalized understanding.  Discussed with patient to start gym activity with offloading pad to the area of the wound. C/w local wound care and offloading. Wound is noted with healing and is stable at this time.  RTC in two weeks  Spent 20 minutes for patient care and medical decision making.

## 2024-08-19 NOTE — REVIEW OF SYSTEMS
[Fever] : no fever [Chills] : no chills [Eye Pain] : no eye pain [Earache] : no earache [Shortness Of Breath] : no shortness of breath [Wheezing] : no wheezing [Abdominal Pain] : no abdominal pain [Arthralgias] : arthralgias [Joint Stiffness] : joint stiffness [Skin Wound] : skin wound [Limb Weakness] : no limb weakness [Difficulty Walking] : no difficulty walking [Anxiety] : no anxiety [Negative] : Heme/Lymph [FreeTextEntry5] : HTN, HLD [FreeTextEntry9] : Swollen left foot  [de-identified] : s/p amp 4th toe and partial metatarsal , closed ; new wound to the plantar 1st metatarsal head down to fat and fissure to the distal hallux  [de-identified] : NIDDM with neuropathy  [de-identified] : NIDDM

## 2024-08-19 NOTE — PHYSICAL EXAM
[2 x 2] : 2 x 2  [0] : left 0 [1+] : left 1+ [Ankle Swelling (On Exam)] : present [Ankle Swelling Bilaterally] : bilaterally  [Varicose Veins Of Lower Extremities] : bilaterally [Ankle Swelling On The Right] : mild [] : bilaterally [Stool Sample Taken] : No stool obtained  on rectal exam [Purpura] : no purpura  [Petechiae] : no petechiae [Skin Ulcer] : no ulcer [Skin Induration] : no induration [Alert] : alert [Oriented to Person] : oriented to person [Oriented to Place] : oriented to place [Oriented to Time] : oriented to time [Calm] : calm [de-identified] : A&Ox3, NAD [de-identified] : HTN, HLD [de-identified] : 5 out of 5 strength in all quadrants bilaterally, s/p amp 4th toe and partial 4th metatarsal, hammer toes to 1-3 and varus 5th digit left foot  [de-identified] : Loss of light and sensation bilaterally [de-identified] : Left foot plantar first metatarsal head wound down to fat - noted with progress of healing, granular wound base and fissure to the distal hallux- healed   [FreeTextEntry1] : Left Hallux Fissure-Closed [de-identified] : No Product [FreeTextEntry7] : Left Hallux Plantar [FreeTextEntry8] : 0.1 [FreeTextEntry9] : 0.1 [de-identified] : 0.1 [de-identified] : Mild [de-identified] : Iodosorb, Alginate [de-identified] : Mechanically cleansed with 0.9% Normal Saline Allevyn Pad (Donut) Cloth Tape  [TWNoteComboBox4] : None [TWNoteComboBox5] : No [TWNoteComboBox6] : Surgical [de-identified] : No [de-identified] : Normal [de-identified] : None [de-identified] : None [de-identified] : None [de-identified] : No [de-identified] : Diabetic [de-identified] : No [de-identified] : Macerated [de-identified] : None [de-identified] : None [de-identified] : 100% [de-identified] : 3x Weekly [de-identified] : Primary Dressing

## 2024-08-19 NOTE — ASSESSMENT
[Verbal] : Verbal [Written] : Written [Demo] : Demo [Patient] : Patient [Fair - mild discomfort, physical impairment, low acceptance] : Fair - mild discomfort, physical impairment, low acceptance [Verbalizes knowledge/Understanding] : Verbalizes knowledge/understanding [Dressing changes] : dressing changes [Foot Care] : foot care [Skin Care] : skin care [Pressure relief] : pressure relief [Signs and symptoms of infection] : sign and symptoms of infection [Nutrition] : nutrition [How and When to Call] : how and when to call [Labs and Tests] : labs and tests [Off-loading] : off-loading [Patient responsibility to plan of care] : patient responsibility to plan of care [Glycemic Control] : glycemic control [Stable] : stable [Home] : Home [Ambulatory] : Ambulatory [Not Applicable - Long Term Care/Home Health Agency] : Long Term Care/Home Health Agency: Not Applicable [] : No [FreeTextEntry2] : Infection Prevention Wound care and Dressing changes Promote and Restore optimal skin integrity Nutrition and Wound Healing  Offloading and Pressure relief Protect and Guard wound site  Maintain acceptable levels of Pain Compliance R/T Elevation of Lower Extremities [FreeTextEntry4] : Patients' wife will perform dressing changes, no supplies needed Patient to return to [facility] in 2 weeks

## 2024-08-19 NOTE — PLAN
[FreeTextEntry1] : .Patient seen and evaluated. Discussed etiology and treatment plan. Patient verbalized understanding.  Discussed with patient to start gym activity with offloading pad to the area of the wound. C/w local wound care and offloading. Wound is noted with healing and is stable at this time.  RTC in two weeks  Spent 20 minutes for patient care and medical decision making. negative

## 2024-08-19 NOTE — HISTORY OF PRESENT ILLNESS
[FreeTextEntry1] : Patient is 73 year old male presenting for f/u for left foot plantar 1st metatarsal head diabetic ulcer down to fat - noted with healing and fissure to the distal great toe - healed. Patient has neuropathy and denies any pain to the left foot. Patient states has started walking.

## 2024-08-19 NOTE — REVIEW OF SYSTEMS
[Fever] : no fever [Chills] : no chills [Eye Pain] : no eye pain [Earache] : no earache [Shortness Of Breath] : no shortness of breath [Wheezing] : no wheezing [Abdominal Pain] : no abdominal pain [Arthralgias] : arthralgias [Joint Stiffness] : joint stiffness [Skin Wound] : skin wound [Limb Weakness] : no limb weakness [Difficulty Walking] : no difficulty walking [Anxiety] : no anxiety [Negative] : Heme/Lymph [FreeTextEntry9] : Swollen left foot  [FreeTextEntry5] : HTN, HLD [de-identified] : s/p amp 4th toe and partial metatarsal , closed ; new wound to the plantar 1st metatarsal head down to fat and fissure to the distal hallux  [de-identified] : NIDDM with neuropathy  [de-identified] : NIDDM

## 2024-08-19 NOTE — PHYSICAL EXAM
[2 x 2] : 2 x 2  [0] : left 0 [1+] : left 1+ [Ankle Swelling (On Exam)] : present [Ankle Swelling Bilaterally] : bilaterally  [Varicose Veins Of Lower Extremities] : bilaterally [Ankle Swelling On The Right] : mild [] : bilaterally [Stool Sample Taken] : No stool obtained  on rectal exam [Purpura] : no purpura  [Petechiae] : no petechiae [Skin Ulcer] : no ulcer [Skin Induration] : no induration [Alert] : alert [Oriented to Person] : oriented to person [Oriented to Place] : oriented to place [Oriented to Time] : oriented to time [Calm] : calm [de-identified] : A&Ox3, NAD [de-identified] : HTN, HLD [de-identified] : 5 out of 5 strength in all quadrants bilaterally, s/p amp 4th toe and partial 4th metatarsal, hammer toes to 1-3 and varus 5th digit left foot  [de-identified] : Left foot plantar first metatarsal head wound down to fat - noted with progress of healing, granular wound base and fissure to the distal hallux- healed   [de-identified] : Loss of light and sensation bilaterally [FreeTextEntry1] : Left Hallux Fissure-Closed [de-identified] : No Product [FreeTextEntry7] : Left Hallux Plantar [FreeTextEntry8] : 0.1 [FreeTextEntry9] : 0.1 [de-identified] : 0.1 [de-identified] : Mild [de-identified] : Iodosorb, Alginate [de-identified] : Mechanically cleansed with 0.9% Normal Saline Allevyn Pad (Donut) Cloth Tape  [TWNoteComboBox4] : None [TWNoteComboBox5] : No [TWNoteComboBox6] : Surgical [de-identified] : No [de-identified] : Normal [de-identified] : None [de-identified] : None [de-identified] : None [de-identified] : No [de-identified] : Diabetic [de-identified] : No [de-identified] : Macerated [de-identified] : None [de-identified] : None [de-identified] : 100% [de-identified] : 3x Weekly [de-identified] : Primary Dressing

## 2024-08-29 ENCOUNTER — APPOINTMENT (OUTPATIENT)
Dept: WOUND CARE | Facility: HOSPITAL | Age: 74
End: 2024-08-29
Payer: COMMERCIAL

## 2024-08-29 ENCOUNTER — OUTPATIENT (OUTPATIENT)
Dept: OUTPATIENT SERVICES | Facility: HOSPITAL | Age: 74
LOS: 1 days | Discharge: HOME | End: 2024-08-29
Payer: COMMERCIAL

## 2024-08-29 VITALS
BODY MASS INDEX: 25.05 KG/M2 | TEMPERATURE: 97.9 F | RESPIRATION RATE: 18 BRPM | DIASTOLIC BLOOD PRESSURE: 73 MMHG | HEIGHT: 70 IN | SYSTOLIC BLOOD PRESSURE: 187 MMHG | OXYGEN SATURATION: 99 % | HEART RATE: 54 BPM | WEIGHT: 175 LBS

## 2024-08-29 DIAGNOSIS — T87.89 OTHER COMPLICATIONS OF AMPUTATION STUMP: ICD-10-CM

## 2024-08-29 DIAGNOSIS — E11.621 TYPE 2 DIABETES MELLITUS WITH FOOT ULCER: ICD-10-CM

## 2024-08-29 DIAGNOSIS — L97.529 TYPE 2 DIABETES MELLITUS WITH FOOT ULCER: ICD-10-CM

## 2024-08-29 DIAGNOSIS — L97.522 NON-PRESSURE CHRONIC ULCER OF OTHER PART OF LEFT FOOT WITH FAT LAYER EXPOSED: ICD-10-CM

## 2024-08-29 DIAGNOSIS — L84 CORNS AND CALLOSITIES: ICD-10-CM

## 2024-08-29 PROCEDURE — G0463: CPT

## 2024-08-29 PROCEDURE — 99213 OFFICE O/P EST LOW 20 MIN: CPT

## 2024-09-02 DIAGNOSIS — Z89.422 ACQUIRED ABSENCE OF OTHER LEFT TOE(S): ICD-10-CM

## 2024-09-02 DIAGNOSIS — Z79.82 LONG TERM (CURRENT) USE OF ASPIRIN: ICD-10-CM

## 2024-09-02 DIAGNOSIS — Z79.899 OTHER LONG TERM (CURRENT) DRUG THERAPY: ICD-10-CM

## 2024-09-02 DIAGNOSIS — E11.40 TYPE 2 DIABETES MELLITUS WITH DIABETIC NEUROPATHY, UNSPECIFIED: ICD-10-CM

## 2024-09-02 DIAGNOSIS — E78.5 HYPERLIPIDEMIA, UNSPECIFIED: ICD-10-CM

## 2024-09-02 DIAGNOSIS — Z79.84 LONG TERM (CURRENT) USE OF ORAL HYPOGLYCEMIC DRUGS: ICD-10-CM

## 2024-09-02 DIAGNOSIS — L97.522 NON-PRESSURE CHRONIC ULCER OF OTHER PART OF LEFT FOOT WITH FAT LAYER EXPOSED: ICD-10-CM

## 2024-09-02 DIAGNOSIS — E55.9 VITAMIN D DEFICIENCY, UNSPECIFIED: ICD-10-CM

## 2024-09-02 DIAGNOSIS — Z79.890 HORMONE REPLACEMENT THERAPY: ICD-10-CM

## 2024-09-02 DIAGNOSIS — E11.621 TYPE 2 DIABETES MELLITUS WITH FOOT ULCER: ICD-10-CM

## 2024-09-02 DIAGNOSIS — L84 CORNS AND CALLOSITIES: ICD-10-CM

## 2024-09-02 DIAGNOSIS — I10 ESSENTIAL (PRIMARY) HYPERTENSION: ICD-10-CM

## 2024-09-02 DIAGNOSIS — E03.9 HYPOTHYROIDISM, UNSPECIFIED: ICD-10-CM

## 2024-09-02 NOTE — ASSESSMENT
[Verbal] : Verbal [Written] : Written [Patient] : Patient [Good - alert, interested, motivated] : Good - alert, interested, motivated [Verbalizes knowledge/Understanding] : Verbalizes knowledge/understanding [Foot Care] : foot care [Skin Care] : skin care [Signs and symptoms of infection] : sign and symptoms of infection [Nutrition] : nutrition [How and When to Call] : how and when to call [Pain Management] : pain management [Patient responsibility to plan of care] : patient responsibility to plan of care [Glycemic Control] : glycemic control [] : Yes [Stable] : stable [Home] : Home [Ambulatory] : Ambulatory [Not Applicable - Long Term Care/Home Health Agency] : Long Term Care/Home Health Agency: Not Applicable [FreeTextEntry2] : daily foot checks   Promote optimal skin care and nutrition. [FreeTextEntry4] : Follow up in 4 weeks

## 2024-09-02 NOTE — PLAN
[FreeTextEntry1] : .Patient seen and evaluated. Discussed etiology and treatment plan. Patient verbalized understanding.  Discussed with patient to start gym activity with offloading pad to the area of the wound. Wound is noted with healing and is stable at this time.  RTC in four  weeks. Spent 20 minutes for patient care and medical decision making.

## 2024-09-02 NOTE — PHYSICAL EXAM
[0] : left 0 [1+] : left 1+ [Ankle Swelling (On Exam)] : present [Ankle Swelling Bilaterally] : bilaterally  [Varicose Veins Of Lower Extremities] : bilaterally [Ankle Swelling On The Right] : mild [] : bilaterally [Stool Sample Taken] : No stool obtained  on rectal exam [Purpura] : no purpura  [Petechiae] : no petechiae [Skin Ulcer] : no ulcer [Skin Induration] : no induration [Alert] : alert [Oriented to Person] : oriented to person [Oriented to Place] : oriented to place [Oriented to Time] : oriented to time [Calm] : calm [de-identified] : A&Ox3, NAD [de-identified] : HTN, HLD [de-identified] : 5 out of 5 strength in all quadrants bilaterally, s/p amp 4th toe and partial 4th metatarsal, hammer toes to 1-3 and varus 5th digit left foot  [de-identified] : Left foot plantar first metatarsal head wound down to fat - healed and fissure to the distal hallux- healed   [de-identified] : Loss of light and sensation bilaterally [FreeTextEntry1] : Left callus - closed callus  [de-identified] : callus   [de-identified] : Mechanically cleansed with sterile gauze and normal saline no treatment . [FreeTextEntry7] : Left plantar 1st Met - closed - callus  [de-identified] : callus  [de-identified] : Mechanically cleansed with sterile gauze and normal saline. no treatment  [TWNoteComboBox4] : None [de-identified] : other [de-identified] : None [de-identified] : None [de-identified] : No [de-identified] : None [de-identified] : other [de-identified] : None [de-identified] : None [de-identified] : No

## 2024-09-02 NOTE — PHYSICAL EXAM
[0] : left 0 [1+] : left 1+ [Ankle Swelling (On Exam)] : present [Ankle Swelling Bilaterally] : bilaterally  [Varicose Veins Of Lower Extremities] : bilaterally [Ankle Swelling On The Right] : mild [] : bilaterally [Stool Sample Taken] : No stool obtained  on rectal exam [Purpura] : no purpura  [Petechiae] : no petechiae [Skin Ulcer] : no ulcer [Skin Induration] : no induration [Alert] : alert [Oriented to Person] : oriented to person [Oriented to Place] : oriented to place [Oriented to Time] : oriented to time [Calm] : calm [de-identified] : A&Ox3, NAD [de-identified] : HTN, HLD [de-identified] : 5 out of 5 strength in all quadrants bilaterally, s/p amp 4th toe and partial 4th metatarsal, hammer toes to 1-3 and varus 5th digit left foot  [de-identified] : Left foot plantar first metatarsal head wound down to fat - healed and fissure to the distal hallux- healed   [de-identified] : Loss of light and sensation bilaterally [FreeTextEntry1] : Left callus - closed callus  [de-identified] : callus   [de-identified] : Mechanically cleansed with sterile gauze and normal saline no treatment . [FreeTextEntry7] : Left plantar 1st Met - closed - callus  [de-identified] : callus  [de-identified] : Mechanically cleansed with sterile gauze and normal saline. no treatment  [TWNoteComboBox4] : None [de-identified] : other [de-identified] : None [de-identified] : None [de-identified] : No [de-identified] : None [de-identified] : other [de-identified] : None [de-identified] : None [de-identified] : No

## 2024-09-02 NOTE — REVIEW OF SYSTEMS
[Fever] : no fever [Chills] : no chills [Eye Pain] : no eye pain [Earache] : no earache [Shortness Of Breath] : no shortness of breath [Wheezing] : no wheezing [Abdominal Pain] : no abdominal pain [Arthralgias] : arthralgias [Joint Stiffness] : joint stiffness [Skin Wound] : skin wound [Limb Weakness] : no limb weakness [Difficulty Walking] : no difficulty walking [Anxiety] : no anxiety [Negative] : Heme/Lymph [FreeTextEntry5] : HTN, HLD [FreeTextEntry9] : Swollen left foot  [de-identified] : s/p amp 4th toe and partial metatarsal , closed ; new wound to the plantar 1st metatarsal head down to fat and fissure to the distal hallux  [de-identified] : NIDDM with neuropathy  [de-identified] : NIDDM

## 2024-09-02 NOTE — REVIEW OF SYSTEMS
[Fever] : no fever [Chills] : no chills [Eye Pain] : no eye pain [Earache] : no earache [Shortness Of Breath] : no shortness of breath [Wheezing] : no wheezing [Abdominal Pain] : no abdominal pain [Arthralgias] : arthralgias [Joint Stiffness] : joint stiffness [Skin Wound] : skin wound [Limb Weakness] : no limb weakness [Difficulty Walking] : no difficulty walking [Anxiety] : no anxiety [Negative] : Heme/Lymph [FreeTextEntry5] : HTN, HLD [FreeTextEntry9] : Swollen left foot  [de-identified] : s/p amp 4th toe and partial metatarsal , closed ; new wound to the plantar 1st metatarsal head down to fat and fissure to the distal hallux  [de-identified] : NIDDM with neuropathy  [de-identified] : NIDDM

## 2024-09-02 NOTE — HISTORY OF PRESENT ILLNESS
[FreeTextEntry1] : Patient is 73 year old male presenting for f/u for left foot plantar 1st metatarsal head diabetic ulcer down to fat  healed and fissure to the distal great toe - healed. Patient has neuropathy and denies any pain to the left foot. Patient states has started walking.

## 2024-09-26 ENCOUNTER — APPOINTMENT (OUTPATIENT)
Dept: WOUND CARE | Facility: HOSPITAL | Age: 74
End: 2024-09-26
Payer: COMMERCIAL

## 2024-09-26 ENCOUNTER — OUTPATIENT (OUTPATIENT)
Dept: OUTPATIENT SERVICES | Facility: HOSPITAL | Age: 74
LOS: 1 days | Discharge: HOME | End: 2024-09-26
Payer: COMMERCIAL

## 2024-09-26 VITALS
SYSTOLIC BLOOD PRESSURE: 194 MMHG | OXYGEN SATURATION: 98 % | HEIGHT: 70 IN | HEART RATE: 50 BPM | BODY MASS INDEX: 25.05 KG/M2 | TEMPERATURE: 97.5 F | WEIGHT: 175 LBS | DIASTOLIC BLOOD PRESSURE: 75 MMHG | RESPIRATION RATE: 20 BRPM

## 2024-09-26 DIAGNOSIS — T87.89 OTHER COMPLICATIONS OF AMPUTATION STUMP: ICD-10-CM

## 2024-09-26 DIAGNOSIS — E11.621 TYPE 2 DIABETES MELLITUS WITH FOOT ULCER: ICD-10-CM

## 2024-09-26 DIAGNOSIS — L97.529 TYPE 2 DIABETES MELLITUS WITH FOOT ULCER: ICD-10-CM

## 2024-09-26 DIAGNOSIS — L97.522 NON-PRESSURE CHRONIC ULCER OF OTHER PART OF LEFT FOOT WITH FAT LAYER EXPOSED: ICD-10-CM

## 2024-09-26 DIAGNOSIS — Z89.422 ACQUIRED ABSENCE OF OTHER LEFT TOE(S): ICD-10-CM

## 2024-09-26 DIAGNOSIS — L84 CORNS AND CALLOSITIES: ICD-10-CM

## 2024-09-26 PROCEDURE — G0463: CPT

## 2024-09-26 PROCEDURE — 99213 OFFICE O/P EST LOW 20 MIN: CPT

## 2024-09-29 DIAGNOSIS — Z79.890 HORMONE REPLACEMENT THERAPY: ICD-10-CM

## 2024-09-29 DIAGNOSIS — E11.40 TYPE 2 DIABETES MELLITUS WITH DIABETIC NEUROPATHY, UNSPECIFIED: ICD-10-CM

## 2024-09-29 DIAGNOSIS — L84 CORNS AND CALLOSITIES: ICD-10-CM

## 2024-09-29 DIAGNOSIS — L97.522 NON-PRESSURE CHRONIC ULCER OF OTHER PART OF LEFT FOOT WITH FAT LAYER EXPOSED: ICD-10-CM

## 2024-09-29 DIAGNOSIS — Z79.84 LONG TERM (CURRENT) USE OF ORAL HYPOGLYCEMIC DRUGS: ICD-10-CM

## 2024-09-29 DIAGNOSIS — Z79.899 OTHER LONG TERM (CURRENT) DRUG THERAPY: ICD-10-CM

## 2024-09-29 DIAGNOSIS — I10 ESSENTIAL (PRIMARY) HYPERTENSION: ICD-10-CM

## 2024-09-29 DIAGNOSIS — E03.9 HYPOTHYROIDISM, UNSPECIFIED: ICD-10-CM

## 2024-09-29 DIAGNOSIS — E55.9 VITAMIN D DEFICIENCY, UNSPECIFIED: ICD-10-CM

## 2024-09-29 DIAGNOSIS — Z79.82 LONG TERM (CURRENT) USE OF ASPIRIN: ICD-10-CM

## 2024-09-29 DIAGNOSIS — E78.5 HYPERLIPIDEMIA, UNSPECIFIED: ICD-10-CM

## 2024-09-29 DIAGNOSIS — E11.621 TYPE 2 DIABETES MELLITUS WITH FOOT ULCER: ICD-10-CM

## 2024-09-29 DIAGNOSIS — Z89.422 ACQUIRED ABSENCE OF OTHER LEFT TOE(S): ICD-10-CM

## 2024-09-29 NOTE — PHYSICAL EXAM
[0] : left 0 [1+] : left 1+ [Ankle Swelling (On Exam)] : present [Ankle Swelling Bilaterally] : bilaterally  [Varicose Veins Of Lower Extremities] : bilaterally [Ankle Swelling On The Right] : mild [] : bilaterally [Stool Sample Taken] : No stool obtained  on rectal exam [Purpura] : no purpura  [Petechiae] : no petechiae [Skin Ulcer] : no ulcer [Skin Induration] : no induration [Alert] : alert [Oriented to Person] : oriented to person [Oriented to Place] : oriented to place [Oriented to Time] : oriented to time [Calm] : calm [de-identified] : A&Ox3, NAD [de-identified] : HTN, HLD [de-identified] : 5 out of 5 strength in all quadrants bilaterally, s/p amp 4th toe and partial 4th metatarsal, hammer toes to 1-3 and varus 5th digit left foot  [de-identified] : Loss of light and sensation bilaterally [de-identified] : Left foot plantar first metatarsal head wound down to fat - reopened  and fissure to the distal hallux- healed   [FreeTextEntry1] : Left Hallux - closed callus  [de-identified] : callus   [de-identified] : Mechanically cleansed with sterile gauze and normal saline no treatment . [FreeTextEntry7] : Left plantar 1st Met - closed - callus  [de-identified] : callus  [de-identified] : Mechanically cleansed with sterile gauze and normal saline. no treatment    *DPM applied betadine today, not needed going forward [TWNoteComboBox4] : None [de-identified] : other [de-identified] : None [de-identified] : None [de-identified] : No [de-identified] : None [de-identified] : other [de-identified] : None [de-identified] : None [de-identified] : No

## 2024-09-29 NOTE — HISTORY OF PRESENT ILLNESS
[FreeTextEntry1] : Patient is 73 year old male presenting for f/u for left foot plantar 1st metatarsal head diabetic ulcer down to fat - reopened and fissure to the distal great toe - healed. Patient has neuropathy and denies any pain to the left foot. Patient states has started walking and baseline gym activities. Patient applies and moisturized his feet daily.

## 2024-09-29 NOTE — REVIEW OF SYSTEMS
[Fever] : no fever [Chills] : no chills [Eye Pain] : no eye pain [Earache] : no earache [Shortness Of Breath] : no shortness of breath [Wheezing] : no wheezing [Abdominal Pain] : no abdominal pain [Arthralgias] : arthralgias [Joint Stiffness] : joint stiffness [Skin Wound] : skin wound [Limb Weakness] : no limb weakness [Difficulty Walking] : no difficulty walking [Anxiety] : no anxiety [Negative] : Heme/Lymph [FreeTextEntry5] : HTN, HLD [FreeTextEntry9] : Swollen left foot  [de-identified] : s/p amp 4th toe and partial metatarsal , closed ; new wound to the plantar 1st metatarsal head down to fat - re-opened and fissure to the distal hallux - healed  [de-identified] : NIDDM [de-identified] : NIDDM with neuropathy

## 2024-09-29 NOTE — PHYSICAL EXAM
[0] : left 0 [1+] : left 1+ [Ankle Swelling (On Exam)] : present [Ankle Swelling Bilaterally] : bilaterally  [Varicose Veins Of Lower Extremities] : bilaterally [Ankle Swelling On The Right] : mild [] : bilaterally [Stool Sample Taken] : No stool obtained  on rectal exam [Purpura] : no purpura  [Petechiae] : no petechiae [Skin Ulcer] : no ulcer [Skin Induration] : no induration [Alert] : alert [Oriented to Person] : oriented to person [Oriented to Place] : oriented to place [Oriented to Time] : oriented to time [Calm] : calm [de-identified] : A&Ox3, NAD [de-identified] : HTN, HLD [de-identified] : 5 out of 5 strength in all quadrants bilaterally, s/p amp 4th toe and partial 4th metatarsal, hammer toes to 1-3 and varus 5th digit left foot  [de-identified] : Loss of light and sensation bilaterally [de-identified] : Left foot plantar first metatarsal head wound down to fat - reopened  and fissure to the distal hallux- healed   [FreeTextEntry1] : Left Hallux - closed callus  [de-identified] : callus   [de-identified] : Mechanically cleansed with sterile gauze and normal saline no treatment . [FreeTextEntry7] : Left plantar 1st Met - closed - callus  [de-identified] : callus  [de-identified] : Mechanically cleansed with sterile gauze and normal saline. no treatment    *DPM applied betadine today, not needed going forward [TWNoteComboBox4] : None [de-identified] : other [de-identified] : None [de-identified] : None [de-identified] : No [de-identified] : None [de-identified] : other [de-identified] : None [de-identified] : None [de-identified] : No

## 2024-09-29 NOTE — PLAN
[FreeTextEntry1] : .Patient seen and evaluated. Discussed etiology and treatment plan. Patient verbalized understanding.  Continue application of betadine and dry dressing. RTc in 2 weeks.  Spent 20 minutes for patient care and medical decision making.

## 2024-09-29 NOTE — ASSESSMENT
[Verbal] : Verbal [Written] : Written [Patient] : Patient [Good - alert, interested, motivated] : Good - alert, interested, motivated [Verbalizes knowledge/Understanding] : Verbalizes knowledge/understanding [Foot Care] : foot care [Skin Care] : skin care [Signs and symptoms of infection] : sign and symptoms of infection [Nutrition] : nutrition [How and When to Call] : how and when to call [Pain Management] : pain management [Patient responsibility to plan of care] : patient responsibility to plan of care [Glycemic Control] : glycemic control [Stable] : stable [Home] : Home [Ambulatory] : Ambulatory [Not Applicable - Long Term Care/Home Health Agency] : Long Term Care/Home Health Agency: Not Applicable [] : No [FreeTextEntry2] : daily foot checks   Promote optimal skin care and nutrition. [FreeTextEntry4] : Follow up in 2 weeks

## 2024-09-29 NOTE — REVIEW OF SYSTEMS
[Fever] : no fever [Chills] : no chills [Eye Pain] : no eye pain [Earache] : no earache [Shortness Of Breath] : no shortness of breath [Wheezing] : no wheezing [Abdominal Pain] : no abdominal pain [Arthralgias] : arthralgias [Joint Stiffness] : joint stiffness [Skin Wound] : skin wound [Limb Weakness] : no limb weakness [Difficulty Walking] : no difficulty walking [Anxiety] : no anxiety [Negative] : Heme/Lymph [FreeTextEntry5] : HTN, HLD [FreeTextEntry9] : Swollen left foot  [de-identified] : s/p amp 4th toe and partial metatarsal , closed ; new wound to the plantar 1st metatarsal head down to fat - re-opened and fissure to the distal hallux - healed  [de-identified] : NIDDM with neuropathy  [de-identified] : NIDDM

## 2024-10-10 ENCOUNTER — OUTPATIENT (OUTPATIENT)
Dept: OUTPATIENT SERVICES | Facility: HOSPITAL | Age: 74
LOS: 1 days | Discharge: HOME | End: 2024-10-10
Payer: MEDICARE

## 2024-10-10 ENCOUNTER — APPOINTMENT (OUTPATIENT)
Dept: WOUND CARE | Facility: HOSPITAL | Age: 74
End: 2024-10-10
Payer: MEDICARE

## 2024-10-10 VITALS
RESPIRATION RATE: 18 BRPM | WEIGHT: 175 LBS | OXYGEN SATURATION: 98 % | SYSTOLIC BLOOD PRESSURE: 176 MMHG | HEART RATE: 48 BPM | BODY MASS INDEX: 25.05 KG/M2 | TEMPERATURE: 97.7 F | HEIGHT: 70 IN | DIASTOLIC BLOOD PRESSURE: 68 MMHG

## 2024-10-10 DIAGNOSIS — E11.621 TYPE 2 DIABETES MELLITUS WITH FOOT ULCER: ICD-10-CM

## 2024-10-10 DIAGNOSIS — L97.529 TYPE 2 DIABETES MELLITUS WITH FOOT ULCER: ICD-10-CM

## 2024-10-10 DIAGNOSIS — Z89.422 ACQUIRED ABSENCE OF OTHER LEFT TOE(S): ICD-10-CM

## 2024-10-10 DIAGNOSIS — L97.522 NON-PRESSURE CHRONIC ULCER OF OTHER PART OF LEFT FOOT WITH FAT LAYER EXPOSED: ICD-10-CM

## 2024-10-10 DIAGNOSIS — L84 CORNS AND CALLOSITIES: ICD-10-CM

## 2024-10-10 DIAGNOSIS — T87.89 OTHER COMPLICATIONS OF AMPUTATION STUMP: ICD-10-CM

## 2024-10-10 PROCEDURE — G0463: CPT

## 2024-10-10 PROCEDURE — 99203 OFFICE O/P NEW LOW 30 MIN: CPT

## 2024-10-14 DIAGNOSIS — I10 ESSENTIAL (PRIMARY) HYPERTENSION: ICD-10-CM

## 2024-10-14 DIAGNOSIS — E55.9 VITAMIN D DEFICIENCY, UNSPECIFIED: ICD-10-CM

## 2024-10-14 DIAGNOSIS — E11.621 TYPE 2 DIABETES MELLITUS WITH FOOT ULCER: ICD-10-CM

## 2024-10-14 DIAGNOSIS — E11.40 TYPE 2 DIABETES MELLITUS WITH DIABETIC NEUROPATHY, UNSPECIFIED: ICD-10-CM

## 2024-10-14 DIAGNOSIS — Z79.899 OTHER LONG TERM (CURRENT) DRUG THERAPY: ICD-10-CM

## 2024-10-14 DIAGNOSIS — L84 CORNS AND CALLOSITIES: ICD-10-CM

## 2024-10-14 DIAGNOSIS — Z89.422 ACQUIRED ABSENCE OF OTHER LEFT TOE(S): ICD-10-CM

## 2024-10-14 DIAGNOSIS — Z79.82 LONG TERM (CURRENT) USE OF ASPIRIN: ICD-10-CM

## 2024-10-14 DIAGNOSIS — E03.9 HYPOTHYROIDISM, UNSPECIFIED: ICD-10-CM

## 2024-10-14 DIAGNOSIS — Z79.890 HORMONE REPLACEMENT THERAPY: ICD-10-CM

## 2024-10-14 DIAGNOSIS — L97.522 NON-PRESSURE CHRONIC ULCER OF OTHER PART OF LEFT FOOT WITH FAT LAYER EXPOSED: ICD-10-CM

## 2024-10-14 DIAGNOSIS — E78.5 HYPERLIPIDEMIA, UNSPECIFIED: ICD-10-CM

## 2024-10-14 DIAGNOSIS — Z79.84 LONG TERM (CURRENT) USE OF ORAL HYPOGLYCEMIC DRUGS: ICD-10-CM

## 2024-10-24 ENCOUNTER — APPOINTMENT (OUTPATIENT)
Dept: WOUND CARE | Facility: HOSPITAL | Age: 74
End: 2024-10-24
Payer: MEDICARE

## 2024-10-24 VITALS
WEIGHT: 175 LBS | DIASTOLIC BLOOD PRESSURE: 72 MMHG | HEIGHT: 70 IN | BODY MASS INDEX: 25.05 KG/M2 | RESPIRATION RATE: 81 BRPM | HEART RATE: 78 BPM | TEMPERATURE: 97.8 F | SYSTOLIC BLOOD PRESSURE: 176 MMHG | OXYGEN SATURATION: 99 %

## 2024-10-24 DIAGNOSIS — L97.529 TYPE 2 DIABETES MELLITUS WITH FOOT ULCER: ICD-10-CM

## 2024-10-24 DIAGNOSIS — Z89.422 ACQUIRED ABSENCE OF OTHER LEFT TOE(S): ICD-10-CM

## 2024-10-24 DIAGNOSIS — E11.621 TYPE 2 DIABETES MELLITUS WITH FOOT ULCER: ICD-10-CM

## 2024-10-24 DIAGNOSIS — L97.522 NON-PRESSURE CHRONIC ULCER OF OTHER PART OF LEFT FOOT WITH FAT LAYER EXPOSED: ICD-10-CM

## 2024-10-24 DIAGNOSIS — L84 CORNS AND CALLOSITIES: ICD-10-CM

## 2024-10-24 PROCEDURE — 99213 OFFICE O/P EST LOW 20 MIN: CPT

## 2024-12-20 ENCOUNTER — OUTPATIENT (OUTPATIENT)
Dept: OUTPATIENT SERVICES | Facility: HOSPITAL | Age: 74
LOS: 1 days | Discharge: HOME | End: 2024-12-20
Payer: MEDICARE

## 2024-12-20 ENCOUNTER — APPOINTMENT (OUTPATIENT)
Dept: WOUND CARE | Facility: HOSPITAL | Age: 74
End: 2024-12-20
Payer: MEDICARE

## 2024-12-20 VITALS
WEIGHT: 175 LBS | BODY MASS INDEX: 25.05 KG/M2 | HEART RATE: 55 BPM | HEIGHT: 70 IN | TEMPERATURE: 98 F | OXYGEN SATURATION: 98 % | RESPIRATION RATE: 18 BRPM | SYSTOLIC BLOOD PRESSURE: 213 MMHG | DIASTOLIC BLOOD PRESSURE: 80 MMHG

## 2024-12-20 DIAGNOSIS — L84 CORNS AND CALLOSITIES: ICD-10-CM

## 2024-12-20 DIAGNOSIS — L97.529 TYPE 2 DIABETES MELLITUS WITH FOOT ULCER: ICD-10-CM

## 2024-12-20 DIAGNOSIS — L97.522 NON-PRESSURE CHRONIC ULCER OF OTHER PART OF LEFT FOOT WITH FAT LAYER EXPOSED: ICD-10-CM

## 2024-12-20 DIAGNOSIS — E11.621 TYPE 2 DIABETES MELLITUS WITH FOOT ULCER: ICD-10-CM

## 2024-12-20 DIAGNOSIS — Z89.422 ACQUIRED ABSENCE OF OTHER LEFT TOE(S): ICD-10-CM

## 2024-12-20 PROCEDURE — 99213 OFFICE O/P EST LOW 20 MIN: CPT

## 2024-12-20 PROCEDURE — G0463: CPT

## 2024-12-23 DIAGNOSIS — E55.9 VITAMIN D DEFICIENCY, UNSPECIFIED: ICD-10-CM

## 2024-12-23 DIAGNOSIS — E78.5 HYPERLIPIDEMIA, UNSPECIFIED: ICD-10-CM

## 2024-12-23 DIAGNOSIS — Z79.890 HORMONE REPLACEMENT THERAPY: ICD-10-CM

## 2024-12-23 DIAGNOSIS — E03.9 HYPOTHYROIDISM, UNSPECIFIED: ICD-10-CM

## 2024-12-23 DIAGNOSIS — Z79.899 OTHER LONG TERM (CURRENT) DRUG THERAPY: ICD-10-CM

## 2024-12-23 DIAGNOSIS — L97.522 NON-PRESSURE CHRONIC ULCER OF OTHER PART OF LEFT FOOT WITH FAT LAYER EXPOSED: ICD-10-CM

## 2024-12-23 DIAGNOSIS — I10 ESSENTIAL (PRIMARY) HYPERTENSION: ICD-10-CM

## 2024-12-23 DIAGNOSIS — Z79.82 LONG TERM (CURRENT) USE OF ASPIRIN: ICD-10-CM

## 2024-12-23 DIAGNOSIS — E11.40 TYPE 2 DIABETES MELLITUS WITH DIABETIC NEUROPATHY, UNSPECIFIED: ICD-10-CM

## 2024-12-23 DIAGNOSIS — E11.621 TYPE 2 DIABETES MELLITUS WITH FOOT ULCER: ICD-10-CM

## 2024-12-23 DIAGNOSIS — Z79.84 LONG TERM (CURRENT) USE OF ORAL HYPOGLYCEMIC DRUGS: ICD-10-CM

## 2024-12-23 DIAGNOSIS — L84 CORNS AND CALLOSITIES: ICD-10-CM

## 2025-02-20 ENCOUNTER — APPOINTMENT (OUTPATIENT)
Dept: WOUND CARE | Facility: HOSPITAL | Age: 75
End: 2025-02-20
Payer: MEDICARE

## 2025-02-20 ENCOUNTER — OUTPATIENT (OUTPATIENT)
Dept: OUTPATIENT SERVICES | Facility: HOSPITAL | Age: 75
LOS: 1 days | Discharge: HOME | End: 2025-02-20
Payer: MEDICARE

## 2025-02-20 VITALS
SYSTOLIC BLOOD PRESSURE: 201 MMHG | TEMPERATURE: 97.8 F | DIASTOLIC BLOOD PRESSURE: 76 MMHG | HEIGHT: 70 IN | WEIGHT: 175 LBS | BODY MASS INDEX: 25.05 KG/M2 | OXYGEN SATURATION: 99 % | RESPIRATION RATE: 18 BRPM | HEART RATE: 48 BPM

## 2025-02-20 DIAGNOSIS — E11.621 TYPE 2 DIABETES MELLITUS WITH FOOT ULCER: ICD-10-CM

## 2025-02-20 DIAGNOSIS — L84 CORNS AND CALLOSITIES: ICD-10-CM

## 2025-02-20 DIAGNOSIS — L97.522 NON-PRESSURE CHRONIC ULCER OF OTHER PART OF LEFT FOOT WITH FAT LAYER EXPOSED: ICD-10-CM

## 2025-02-20 DIAGNOSIS — L97.529 TYPE 2 DIABETES MELLITUS WITH FOOT ULCER: ICD-10-CM

## 2025-02-20 DIAGNOSIS — Z89.422 ACQUIRED ABSENCE OF OTHER LEFT TOE(S): ICD-10-CM

## 2025-02-20 PROCEDURE — G0463: CPT

## 2025-02-20 PROCEDURE — 99213 OFFICE O/P EST LOW 20 MIN: CPT

## 2025-02-24 DIAGNOSIS — E11.40 TYPE 2 DIABETES MELLITUS WITH DIABETIC NEUROPATHY, UNSPECIFIED: ICD-10-CM

## 2025-02-24 DIAGNOSIS — Z79.82 LONG TERM (CURRENT) USE OF ASPIRIN: ICD-10-CM

## 2025-02-24 DIAGNOSIS — I10 ESSENTIAL (PRIMARY) HYPERTENSION: ICD-10-CM

## 2025-02-24 DIAGNOSIS — L97.522 NON-PRESSURE CHRONIC ULCER OF OTHER PART OF LEFT FOOT WITH FAT LAYER EXPOSED: ICD-10-CM

## 2025-02-24 DIAGNOSIS — Z79.899 OTHER LONG TERM (CURRENT) DRUG THERAPY: ICD-10-CM

## 2025-02-24 DIAGNOSIS — Z79.84 LONG TERM (CURRENT) USE OF ORAL HYPOGLYCEMIC DRUGS: ICD-10-CM

## 2025-02-24 DIAGNOSIS — E03.9 HYPOTHYROIDISM, UNSPECIFIED: ICD-10-CM

## 2025-02-24 DIAGNOSIS — E11.621 TYPE 2 DIABETES MELLITUS WITH FOOT ULCER: ICD-10-CM

## 2025-02-24 DIAGNOSIS — Z79.890 HORMONE REPLACEMENT THERAPY: ICD-10-CM

## 2025-02-24 DIAGNOSIS — L84 CORNS AND CALLOSITIES: ICD-10-CM

## 2025-02-24 DIAGNOSIS — E55.9 VITAMIN D DEFICIENCY, UNSPECIFIED: ICD-10-CM

## 2025-02-24 DIAGNOSIS — E78.5 HYPERLIPIDEMIA, UNSPECIFIED: ICD-10-CM

## 2025-05-08 ENCOUNTER — APPOINTMENT (OUTPATIENT)
Dept: WOUND CARE | Facility: HOSPITAL | Age: 75
End: 2025-05-08
Payer: MEDICARE

## 2025-05-08 ENCOUNTER — OUTPATIENT (OUTPATIENT)
Dept: OUTPATIENT SERVICES | Facility: HOSPITAL | Age: 75
LOS: 1 days | End: 2025-05-08
Payer: MEDICARE

## 2025-05-08 VITALS
SYSTOLIC BLOOD PRESSURE: 178 MMHG | OXYGEN SATURATION: 98 % | WEIGHT: 175 LBS | RESPIRATION RATE: 18 BRPM | HEART RATE: 68 BPM | HEIGHT: 70 IN | DIASTOLIC BLOOD PRESSURE: 86 MMHG | TEMPERATURE: 98.2 F | BODY MASS INDEX: 25.05 KG/M2

## 2025-05-08 DIAGNOSIS — E11.9 TYPE 2 DIABETES MELLITUS W/OUT COMPLICATIONS: ICD-10-CM

## 2025-05-08 DIAGNOSIS — E11.40 TYPE 2 DIABETES MELLITUS WITH DIABETIC NEUROPATHY, UNSPECIFIED: ICD-10-CM

## 2025-05-08 DIAGNOSIS — E11.621 TYPE 2 DIABETES MELLITUS WITH FOOT ULCER: ICD-10-CM

## 2025-05-08 DIAGNOSIS — Z89.422 ACQUIRED ABSENCE OF OTHER LEFT TOE(S): ICD-10-CM

## 2025-05-08 DIAGNOSIS — L84 CORNS AND CALLOSITIES: ICD-10-CM

## 2025-05-08 PROCEDURE — G0463: CPT

## 2025-05-08 PROCEDURE — 99213 OFFICE O/P EST LOW 20 MIN: CPT

## 2025-05-12 DIAGNOSIS — Z79.84 LONG TERM (CURRENT) USE OF ORAL HYPOGLYCEMIC DRUGS: ICD-10-CM

## 2025-05-12 DIAGNOSIS — I10 ESSENTIAL (PRIMARY) HYPERTENSION: ICD-10-CM

## 2025-05-12 DIAGNOSIS — L84 CORNS AND CALLOSITIES: ICD-10-CM

## 2025-05-12 DIAGNOSIS — E78.5 HYPERLIPIDEMIA, UNSPECIFIED: ICD-10-CM

## 2025-05-12 DIAGNOSIS — E55.9 VITAMIN D DEFICIENCY, UNSPECIFIED: ICD-10-CM

## 2025-05-12 DIAGNOSIS — Z79.82 LONG TERM (CURRENT) USE OF ASPIRIN: ICD-10-CM

## 2025-05-12 DIAGNOSIS — Z79.899 OTHER LONG TERM (CURRENT) DRUG THERAPY: ICD-10-CM

## 2025-05-12 DIAGNOSIS — Z79.890 HORMONE REPLACEMENT THERAPY: ICD-10-CM

## 2025-05-12 DIAGNOSIS — E03.9 HYPOTHYROIDISM, UNSPECIFIED: ICD-10-CM

## 2025-05-12 DIAGNOSIS — E11.40 TYPE 2 DIABETES MELLITUS WITH DIABETIC NEUROPATHY, UNSPECIFIED: ICD-10-CM

## 2025-06-20 ENCOUNTER — APPOINTMENT (OUTPATIENT)
Dept: WOUND CARE | Facility: HOSPITAL | Age: 75
End: 2025-06-20
Payer: MEDICARE

## 2025-06-20 ENCOUNTER — OUTPATIENT (OUTPATIENT)
Dept: OUTPATIENT SERVICES | Facility: HOSPITAL | Age: 75
LOS: 1 days | End: 2025-06-20
Payer: MEDICARE

## 2025-06-20 VITALS
HEART RATE: 50 BPM | RESPIRATION RATE: 18 BRPM | DIASTOLIC BLOOD PRESSURE: 73 MMHG | OXYGEN SATURATION: 96 % | BODY MASS INDEX: 33.04 KG/M2 | HEIGHT: 61 IN | TEMPERATURE: 98.2 F | WEIGHT: 175 LBS | SYSTOLIC BLOOD PRESSURE: 177 MMHG

## 2025-06-20 DIAGNOSIS — L84 CORNS AND CALLOSITIES: ICD-10-CM

## 2025-06-20 PROBLEM — M79.2 NEUROPATHIC PAIN OF FOOT: Status: ACTIVE | Noted: 2025-06-20

## 2025-06-20 PROCEDURE — G0463: CPT

## 2025-06-20 PROCEDURE — 73630 X-RAY EXAM OF FOOT: CPT | Mod: 26,LT

## 2025-06-20 PROCEDURE — 73630 X-RAY EXAM OF FOOT: CPT

## 2025-06-20 PROCEDURE — 99213 OFFICE O/P EST LOW 20 MIN: CPT

## 2025-06-20 RX ORDER — AMOXICILLIN AND CLAVULANATE POTASSIUM 875; 125 MG/1; MG/1
875-125 TABLET, COATED ORAL
Qty: 14 | Refills: 0 | Status: COMPLETED | COMMUNITY
Start: 2025-06-20 | End: 2025-06-27

## 2025-06-20 RX ORDER — GABAPENTIN 100 MG/1
100 CAPSULE ORAL
Qty: 7 | Refills: 0 | Status: ACTIVE | COMMUNITY
Start: 2025-06-20 | End: 1900-01-01

## 2025-06-23 DIAGNOSIS — Z79.899 OTHER LONG TERM (CURRENT) DRUG THERAPY: ICD-10-CM

## 2025-06-23 DIAGNOSIS — Z79.84 LONG TERM (CURRENT) USE OF ORAL HYPOGLYCEMIC DRUGS: ICD-10-CM

## 2025-06-23 DIAGNOSIS — L97.522 NON-PRESSURE CHRONIC ULCER OF OTHER PART OF LEFT FOOT WITH FAT LAYER EXPOSED: ICD-10-CM

## 2025-06-23 DIAGNOSIS — E11.40 TYPE 2 DIABETES MELLITUS WITH DIABETIC NEUROPATHY, UNSPECIFIED: ICD-10-CM

## 2025-06-23 DIAGNOSIS — Z79.82 LONG TERM (CURRENT) USE OF ASPIRIN: ICD-10-CM

## 2025-06-23 DIAGNOSIS — E03.9 HYPOTHYROIDISM, UNSPECIFIED: ICD-10-CM

## 2025-06-23 DIAGNOSIS — E11.621 TYPE 2 DIABETES MELLITUS WITH FOOT ULCER: ICD-10-CM

## 2025-06-23 DIAGNOSIS — Z89.422 ACQUIRED ABSENCE OF OTHER LEFT TOE(S): ICD-10-CM

## 2025-06-23 DIAGNOSIS — E55.9 VITAMIN D DEFICIENCY, UNSPECIFIED: ICD-10-CM

## 2025-06-23 DIAGNOSIS — E78.5 HYPERLIPIDEMIA, UNSPECIFIED: ICD-10-CM

## 2025-06-23 DIAGNOSIS — Z79.890 HORMONE REPLACEMENT THERAPY: ICD-10-CM

## 2025-06-23 DIAGNOSIS — I10 ESSENTIAL (PRIMARY) HYPERTENSION: ICD-10-CM

## 2025-06-27 ENCOUNTER — APPOINTMENT (OUTPATIENT)
Dept: WOUND CARE | Facility: HOSPITAL | Age: 75
End: 2025-06-27
Payer: MEDICARE

## 2025-06-27 ENCOUNTER — OUTPATIENT (OUTPATIENT)
Dept: OUTPATIENT SERVICES | Facility: HOSPITAL | Age: 75
LOS: 1 days | End: 2025-06-27
Payer: MEDICARE

## 2025-06-27 VITALS
DIASTOLIC BLOOD PRESSURE: 75 MMHG | HEART RATE: 50 BPM | OXYGEN SATURATION: 98 % | BODY MASS INDEX: 33.04 KG/M2 | HEIGHT: 61 IN | RESPIRATION RATE: 15 BRPM | TEMPERATURE: 97.7 F | WEIGHT: 175 LBS | SYSTOLIC BLOOD PRESSURE: 161 MMHG

## 2025-06-27 DIAGNOSIS — L84 CORNS AND CALLOSITIES: ICD-10-CM

## 2025-06-27 PROCEDURE — G0463: CPT

## 2025-06-27 PROCEDURE — 99213 OFFICE O/P EST LOW 20 MIN: CPT

## 2025-06-27 RX ORDER — GABAPENTIN 100 MG/1
100 CAPSULE ORAL
Qty: 30 | Refills: 0 | Status: ACTIVE | COMMUNITY
Start: 2025-06-27 | End: 1900-01-01

## 2025-06-29 DIAGNOSIS — L97.522 NON-PRESSURE CHRONIC ULCER OF OTHER PART OF LEFT FOOT WITH FAT LAYER EXPOSED: ICD-10-CM

## 2025-06-29 DIAGNOSIS — Z89.422 ACQUIRED ABSENCE OF OTHER LEFT TOE(S): ICD-10-CM

## 2025-06-29 DIAGNOSIS — Z79.890 HORMONE REPLACEMENT THERAPY: ICD-10-CM

## 2025-06-29 DIAGNOSIS — Z79.84 LONG TERM (CURRENT) USE OF ORAL HYPOGLYCEMIC DRUGS: ICD-10-CM

## 2025-06-29 DIAGNOSIS — E78.5 HYPERLIPIDEMIA, UNSPECIFIED: ICD-10-CM

## 2025-06-29 DIAGNOSIS — E03.9 HYPOTHYROIDISM, UNSPECIFIED: ICD-10-CM

## 2025-06-29 DIAGNOSIS — I10 ESSENTIAL (PRIMARY) HYPERTENSION: ICD-10-CM

## 2025-06-29 DIAGNOSIS — E11.621 TYPE 2 DIABETES MELLITUS WITH FOOT ULCER: ICD-10-CM

## 2025-06-29 DIAGNOSIS — Z79.899 OTHER LONG TERM (CURRENT) DRUG THERAPY: ICD-10-CM

## 2025-06-29 DIAGNOSIS — Z79.82 LONG TERM (CURRENT) USE OF ASPIRIN: ICD-10-CM

## 2025-06-29 DIAGNOSIS — E11.40 TYPE 2 DIABETES MELLITUS WITH DIABETIC NEUROPATHY, UNSPECIFIED: ICD-10-CM

## 2025-06-29 DIAGNOSIS — E55.9 VITAMIN D DEFICIENCY, UNSPECIFIED: ICD-10-CM

## 2025-07-02 ENCOUNTER — APPOINTMENT (OUTPATIENT)
Dept: WOUND CARE | Facility: HOSPITAL | Age: 75
End: 2025-07-02
Payer: MEDICARE

## 2025-07-02 ENCOUNTER — OUTPATIENT (OUTPATIENT)
Dept: OUTPATIENT SERVICES | Facility: HOSPITAL | Age: 75
LOS: 1 days | End: 2025-07-02
Payer: MEDICARE

## 2025-07-02 VITALS
RESPIRATION RATE: 15 BRPM | TEMPERATURE: 97.7 F | HEART RATE: 44 BPM | BODY MASS INDEX: 33.04 KG/M2 | HEIGHT: 61 IN | SYSTOLIC BLOOD PRESSURE: 164 MMHG | WEIGHT: 175 LBS | DIASTOLIC BLOOD PRESSURE: 63 MMHG | OXYGEN SATURATION: 97 %

## 2025-07-02 DIAGNOSIS — L97.801 NON-PRESSURE CHRONIC ULCER OF OTHER PART OF UNSPECIFIED LOWER LEG LIMITED TO BREAKDOWN OF SKIN: ICD-10-CM

## 2025-07-02 DIAGNOSIS — E11.621 TYPE 2 DIABETES MELLITUS WITH FOOT ULCER: ICD-10-CM

## 2025-07-02 PROCEDURE — G0463: CPT

## 2025-07-02 PROCEDURE — 99213 OFFICE O/P EST LOW 20 MIN: CPT

## 2025-07-04 DIAGNOSIS — E11.621 TYPE 2 DIABETES MELLITUS WITH FOOT ULCER: ICD-10-CM

## 2025-07-04 DIAGNOSIS — E55.9 VITAMIN D DEFICIENCY, UNSPECIFIED: ICD-10-CM

## 2025-07-04 DIAGNOSIS — E11.40 TYPE 2 DIABETES MELLITUS WITH DIABETIC NEUROPATHY, UNSPECIFIED: ICD-10-CM

## 2025-07-04 DIAGNOSIS — Z79.890 HORMONE REPLACEMENT THERAPY: ICD-10-CM

## 2025-07-04 DIAGNOSIS — E03.9 HYPOTHYROIDISM, UNSPECIFIED: ICD-10-CM

## 2025-07-04 DIAGNOSIS — Z79.82 LONG TERM (CURRENT) USE OF ASPIRIN: ICD-10-CM

## 2025-07-04 DIAGNOSIS — Z79.84 LONG TERM (CURRENT) USE OF ORAL HYPOGLYCEMIC DRUGS: ICD-10-CM

## 2025-07-04 DIAGNOSIS — I10 ESSENTIAL (PRIMARY) HYPERTENSION: ICD-10-CM

## 2025-07-04 DIAGNOSIS — L97.522 NON-PRESSURE CHRONIC ULCER OF OTHER PART OF LEFT FOOT WITH FAT LAYER EXPOSED: ICD-10-CM

## 2025-07-04 DIAGNOSIS — E78.5 HYPERLIPIDEMIA, UNSPECIFIED: ICD-10-CM

## 2025-08-04 ENCOUNTER — OUTPATIENT (OUTPATIENT)
Dept: OUTPATIENT SERVICES | Facility: HOSPITAL | Age: 75
LOS: 1 days | End: 2025-08-04
Payer: MEDICARE

## 2025-08-04 ENCOUNTER — APPOINTMENT (OUTPATIENT)
Dept: WOUND CARE | Facility: HOSPITAL | Age: 75
End: 2025-08-04
Payer: MEDICARE

## 2025-08-04 VITALS
RESPIRATION RATE: 15 BRPM | OXYGEN SATURATION: 95 % | HEART RATE: 50 BPM | HEIGHT: 61 IN | DIASTOLIC BLOOD PRESSURE: 74 MMHG | WEIGHT: 175 LBS | SYSTOLIC BLOOD PRESSURE: 164 MMHG | BODY MASS INDEX: 33.04 KG/M2 | TEMPERATURE: 97.7 F

## 2025-08-04 DIAGNOSIS — E11.621 TYPE 2 DIABETES MELLITUS WITH FOOT ULCER: ICD-10-CM

## 2025-08-04 DIAGNOSIS — Z89.422 ACQUIRED ABSENCE OF OTHER LEFT TOE(S): ICD-10-CM

## 2025-08-04 PROCEDURE — G0463: CPT

## 2025-08-04 PROCEDURE — 99214 OFFICE O/P EST MOD 30 MIN: CPT

## 2025-08-06 DIAGNOSIS — Z79.890 HORMONE REPLACEMENT THERAPY: ICD-10-CM

## 2025-08-06 DIAGNOSIS — E03.9 HYPOTHYROIDISM, UNSPECIFIED: ICD-10-CM

## 2025-08-06 DIAGNOSIS — E11.40 TYPE 2 DIABETES MELLITUS WITH DIABETIC NEUROPATHY, UNSPECIFIED: ICD-10-CM

## 2025-08-06 DIAGNOSIS — E55.9 VITAMIN D DEFICIENCY, UNSPECIFIED: ICD-10-CM

## 2025-08-06 DIAGNOSIS — I10 ESSENTIAL (PRIMARY) HYPERTENSION: ICD-10-CM

## 2025-08-06 DIAGNOSIS — Z79.82 LONG TERM (CURRENT) USE OF ASPIRIN: ICD-10-CM

## 2025-08-06 DIAGNOSIS — E78.5 HYPERLIPIDEMIA, UNSPECIFIED: ICD-10-CM

## 2025-08-06 DIAGNOSIS — L97.522 NON-PRESSURE CHRONIC ULCER OF OTHER PART OF LEFT FOOT WITH FAT LAYER EXPOSED: ICD-10-CM

## 2025-08-06 DIAGNOSIS — Z79.84 LONG TERM (CURRENT) USE OF ORAL HYPOGLYCEMIC DRUGS: ICD-10-CM

## 2025-08-06 DIAGNOSIS — E11.621 TYPE 2 DIABETES MELLITUS WITH FOOT ULCER: ICD-10-CM

## 2025-08-07 ENCOUNTER — NON-APPOINTMENT (OUTPATIENT)
Age: 75
End: 2025-08-07

## 2025-08-11 ENCOUNTER — OUTPATIENT (OUTPATIENT)
Dept: OUTPATIENT SERVICES | Facility: HOSPITAL | Age: 75
LOS: 1 days | End: 2025-08-11
Payer: MEDICARE

## 2025-08-11 ENCOUNTER — APPOINTMENT (OUTPATIENT)
Dept: WOUND CARE | Facility: HOSPITAL | Age: 75
End: 2025-08-11
Payer: MEDICARE

## 2025-08-11 VITALS
WEIGHT: 175 LBS | HEIGHT: 61 IN | OXYGEN SATURATION: 98 % | HEART RATE: 50 BPM | BODY MASS INDEX: 33.04 KG/M2 | DIASTOLIC BLOOD PRESSURE: 70 MMHG | SYSTOLIC BLOOD PRESSURE: 168 MMHG | TEMPERATURE: 97.7 F | RESPIRATION RATE: 16 BRPM

## 2025-08-11 DIAGNOSIS — L97.522 NON-PRESSURE CHRONIC ULCER OF OTHER PART OF LEFT FOOT WITH FAT LAYER EXPOSED: ICD-10-CM

## 2025-08-11 DIAGNOSIS — L97.529 TYPE 2 DIABETES MELLITUS WITH FOOT ULCER: ICD-10-CM

## 2025-08-11 DIAGNOSIS — E11.621 TYPE 2 DIABETES MELLITUS WITH FOOT ULCER: ICD-10-CM

## 2025-08-11 DIAGNOSIS — E11.40 TYPE 2 DIABETES MELLITUS WITH DIABETIC NEUROPATHY, UNSPECIFIED: ICD-10-CM

## 2025-08-11 PROCEDURE — 99214 OFFICE O/P EST MOD 30 MIN: CPT

## 2025-08-11 PROCEDURE — G0463: CPT

## 2025-08-11 RX ORDER — GABAPENTIN 100 MG/1
100 CAPSULE ORAL DAILY
Qty: 30 | Refills: 2 | Status: ACTIVE | COMMUNITY
Start: 2025-08-11 | End: 1900-01-01

## 2025-08-12 DIAGNOSIS — E11.40 TYPE 2 DIABETES MELLITUS WITH DIABETIC NEUROPATHY, UNSPECIFIED: ICD-10-CM

## 2025-08-12 DIAGNOSIS — E11.621 TYPE 2 DIABETES MELLITUS WITH FOOT ULCER: ICD-10-CM

## 2025-08-12 DIAGNOSIS — Z79.890 HORMONE REPLACEMENT THERAPY: ICD-10-CM

## 2025-08-12 DIAGNOSIS — Z79.899 OTHER LONG TERM (CURRENT) DRUG THERAPY: ICD-10-CM

## 2025-08-12 DIAGNOSIS — E55.9 VITAMIN D DEFICIENCY, UNSPECIFIED: ICD-10-CM

## 2025-08-12 DIAGNOSIS — Z79.82 LONG TERM (CURRENT) USE OF ASPIRIN: ICD-10-CM

## 2025-08-12 DIAGNOSIS — Z79.84 LONG TERM (CURRENT) USE OF ORAL HYPOGLYCEMIC DRUGS: ICD-10-CM

## 2025-08-12 DIAGNOSIS — Z89.422 ACQUIRED ABSENCE OF OTHER LEFT TOE(S): ICD-10-CM

## 2025-08-12 DIAGNOSIS — L97.522 NON-PRESSURE CHRONIC ULCER OF OTHER PART OF LEFT FOOT WITH FAT LAYER EXPOSED: ICD-10-CM

## 2025-08-12 DIAGNOSIS — E03.9 HYPOTHYROIDISM, UNSPECIFIED: ICD-10-CM

## 2025-08-12 DIAGNOSIS — E78.5 HYPERLIPIDEMIA, UNSPECIFIED: ICD-10-CM

## 2025-08-12 DIAGNOSIS — I10 ESSENTIAL (PRIMARY) HYPERTENSION: ICD-10-CM

## 2025-08-18 DIAGNOSIS — M79.606 PAIN IN LEG, UNSPECIFIED: ICD-10-CM

## 2025-08-19 ENCOUNTER — APPOINTMENT (OUTPATIENT)
Dept: WOUND CARE | Facility: HOSPITAL | Age: 75
End: 2025-08-19

## 2025-08-19 ENCOUNTER — OUTPATIENT (OUTPATIENT)
Dept: OUTPATIENT SERVICES | Facility: HOSPITAL | Age: 75
LOS: 1 days | End: 2025-08-19
Payer: MEDICARE

## 2025-08-19 DIAGNOSIS — E11.621 TYPE 2 DIABETES MELLITUS WITH FOOT ULCER: ICD-10-CM

## 2025-08-19 PROCEDURE — 93923 UPR/LXTR ART STDY 3+ LVLS: CPT | Mod: 26

## 2025-08-19 PROCEDURE — 93923 UPR/LXTR ART STDY 3+ LVLS: CPT

## 2025-08-20 ENCOUNTER — APPOINTMENT (OUTPATIENT)
Dept: VASCULAR SURGERY | Facility: HOSPITAL | Age: 75
End: 2025-08-20
Payer: MEDICARE

## 2025-08-20 ENCOUNTER — OUTPATIENT (OUTPATIENT)
Dept: OUTPATIENT SERVICES | Facility: HOSPITAL | Age: 75
LOS: 1 days | End: 2025-08-20
Payer: MEDICARE

## 2025-08-20 VITALS
HEART RATE: 44 BPM | TEMPERATURE: 97.9 F | SYSTOLIC BLOOD PRESSURE: 207 MMHG | RESPIRATION RATE: 17 BRPM | WEIGHT: 175 LBS | DIASTOLIC BLOOD PRESSURE: 75 MMHG | HEIGHT: 61 IN | BODY MASS INDEX: 33.04 KG/M2 | OXYGEN SATURATION: 98 %

## 2025-08-20 DIAGNOSIS — L98.499 UNSPECIFIED ATHEROSCLEROSIS OF NATIVE ARTERIES OF EXTREMITIES, UNSPECIFIED EXTREMITY: ICD-10-CM

## 2025-08-20 DIAGNOSIS — I70.245 ATHEROSCLEROSIS OF NATIVE ARTERIES OF LEFT LEG WITH ULCERATION OF OTHER PART OF FOOT: ICD-10-CM

## 2025-08-20 DIAGNOSIS — Z79.890 HORMONE REPLACEMENT THERAPY: ICD-10-CM

## 2025-08-20 DIAGNOSIS — E11.621 TYPE 2 DIABETES MELLITUS WITH FOOT ULCER: ICD-10-CM

## 2025-08-20 DIAGNOSIS — Z79.84 LONG TERM (CURRENT) USE OF ORAL HYPOGLYCEMIC DRUGS: ICD-10-CM

## 2025-08-20 DIAGNOSIS — E55.9 VITAMIN D DEFICIENCY, UNSPECIFIED: ICD-10-CM

## 2025-08-20 DIAGNOSIS — E78.5 HYPERLIPIDEMIA, UNSPECIFIED: ICD-10-CM

## 2025-08-20 DIAGNOSIS — E11.40 TYPE 2 DIABETES MELLITUS WITH DIABETIC NEUROPATHY, UNSPECIFIED: ICD-10-CM

## 2025-08-20 DIAGNOSIS — Z79.899 OTHER LONG TERM (CURRENT) DRUG THERAPY: ICD-10-CM

## 2025-08-20 DIAGNOSIS — I10 ESSENTIAL (PRIMARY) HYPERTENSION: ICD-10-CM

## 2025-08-20 DIAGNOSIS — I70.209 UNSPECIFIED ATHEROSCLEROSIS OF NATIVE ARTERIES OF EXTREMITIES, UNSPECIFIED EXTREMITY: ICD-10-CM

## 2025-08-20 DIAGNOSIS — Z79.82 LONG TERM (CURRENT) USE OF ASPIRIN: ICD-10-CM

## 2025-08-20 DIAGNOSIS — E03.9 HYPOTHYROIDISM, UNSPECIFIED: ICD-10-CM

## 2025-08-20 DIAGNOSIS — L97.529 NON-PRESSURE CHRONIC ULCER OF OTHER PART OF LEFT FOOT WITH UNSPECIFIED SEVERITY: ICD-10-CM

## 2025-08-20 PROCEDURE — 99204 OFFICE O/P NEW MOD 45 MIN: CPT

## 2025-08-20 PROCEDURE — G0463: CPT

## 2025-08-27 ENCOUNTER — OUTPATIENT (OUTPATIENT)
Dept: OUTPATIENT SERVICES | Facility: HOSPITAL | Age: 75
LOS: 1 days | End: 2025-08-27
Payer: MEDICARE

## 2025-08-27 ENCOUNTER — APPOINTMENT (OUTPATIENT)
Dept: WOUND CARE | Facility: HOSPITAL | Age: 75
End: 2025-08-27
Payer: MEDICARE

## 2025-08-27 VITALS
HEART RATE: 50 BPM | SYSTOLIC BLOOD PRESSURE: 211 MMHG | DIASTOLIC BLOOD PRESSURE: 77 MMHG | TEMPERATURE: 97.8 F | WEIGHT: 175 LBS | OXYGEN SATURATION: 98 % | RESPIRATION RATE: 17 BRPM | BODY MASS INDEX: 33.04 KG/M2 | HEIGHT: 61 IN

## 2025-08-27 DIAGNOSIS — Z79.84 LONG TERM (CURRENT) USE OF ORAL HYPOGLYCEMIC DRUGS: ICD-10-CM

## 2025-08-27 DIAGNOSIS — E03.9 HYPOTHYROIDISM, UNSPECIFIED: ICD-10-CM

## 2025-08-27 DIAGNOSIS — L97.522 NON-PRESSURE CHRONIC ULCER OF OTHER PART OF LEFT FOOT WITH FAT LAYER EXPOSED: ICD-10-CM

## 2025-08-27 DIAGNOSIS — I10 ESSENTIAL (PRIMARY) HYPERTENSION: ICD-10-CM

## 2025-08-27 DIAGNOSIS — L97.529 TYPE 2 DIABETES MELLITUS WITH FOOT ULCER: ICD-10-CM

## 2025-08-27 DIAGNOSIS — E78.5 HYPERLIPIDEMIA, UNSPECIFIED: ICD-10-CM

## 2025-08-27 DIAGNOSIS — Z89.422 ACQUIRED ABSENCE OF OTHER LEFT TOE(S): ICD-10-CM

## 2025-08-27 DIAGNOSIS — E55.9 VITAMIN D DEFICIENCY, UNSPECIFIED: ICD-10-CM

## 2025-08-27 DIAGNOSIS — E11.621 TYPE 2 DIABETES MELLITUS WITH FOOT ULCER: ICD-10-CM

## 2025-08-27 DIAGNOSIS — Z79.890 HORMONE REPLACEMENT THERAPY: ICD-10-CM

## 2025-08-27 DIAGNOSIS — Z79.82 LONG TERM (CURRENT) USE OF ASPIRIN: ICD-10-CM

## 2025-08-27 DIAGNOSIS — E11.40 TYPE 2 DIABETES MELLITUS WITH DIABETIC NEUROPATHY, UNSPECIFIED: ICD-10-CM

## 2025-08-27 PROCEDURE — G0463: CPT

## 2025-08-27 PROCEDURE — 99214 OFFICE O/P EST MOD 30 MIN: CPT

## 2025-09-10 ENCOUNTER — APPOINTMENT (OUTPATIENT)
Dept: WOUND CARE | Facility: HOSPITAL | Age: 75
End: 2025-09-10

## (undated) DEVICE — PREP BETADINE KIT

## (undated) DEVICE — GLV 6.5 PROTEXIS (BLUE)

## (undated) DEVICE — PREP ALCOHOL PAD MED

## (undated) DEVICE — DRSG COMBINE 5X9"

## (undated) DEVICE — SAW BLADE LINVATEC SAGITTAL 19.5X41.0X..4MM COARSE

## (undated) DEVICE — VENODYNE/SCD SLEEVE CALF MEDIUM

## (undated) DEVICE — SUT MONOSOF 3-0 18" P-14

## (undated) DEVICE — NDL HYPO SAFE 18G X 1.5" (PINK)

## (undated) DEVICE — FLOORMAT SURGISAFE ABSORBANT WHITE 36" X 72"

## (undated) DEVICE — BUR MICROAIRE CARBIDE OVAL 4MM 8 FLUTE

## (undated) DEVICE — SYR LUER LOK 10CC

## (undated) DEVICE — SPECIMEN CONTAINER 4OZ

## (undated) DEVICE — PLV-SCD MACHINE: Type: DURABLE MEDICAL EQUIPMENT

## (undated) DEVICE — SUT POLYSORB 4-0 18" P-12 UNDYED

## (undated) DEVICE — NDL HYPO SAFE 22G X 1.5" (BLACK)

## (undated) DEVICE — WARMING BLANKET UPPER ADULT

## (undated) DEVICE — DRSG CURITY GAUZE SPONGE 4 X 4" 12-PLY

## (undated) DEVICE — DRSG TELFA 3 X 8

## (undated) DEVICE — SUT MONOSOF 2-0 18" C-15

## (undated) DEVICE — SOL IRR POUR NS 0.9% 1000ML

## (undated) DEVICE — GLV 7 PROTEXIS (WHITE)

## (undated) DEVICE — SOL IRR POUR H2O 1000ML

## (undated) DEVICE — SUT POLYSORB 2-0 30" GS-22 UNDYED

## (undated) DEVICE — DRAPE 3/4 SHEET W REINFORCEMENT 56X77"

## (undated) DEVICE — DRSG KERLIX ROLL 4.5"

## (undated) DEVICE — PLV/PSP-ESU FORCEFX F8J7721A: Type: DURABLE MEDICAL EQUIPMENT

## (undated) DEVICE — VENODYNE/SCD SLEEVE CALF LARGE

## (undated) DEVICE — BLADE SCALPEL SAFETYLOCK #15

## (undated) DEVICE — PACK LOWER EXTREMITY NS PLAINVI

## (undated) DEVICE — DRSG XEROFORM 1 X 8"

## (undated) DEVICE — BLADE SCALPEL SAFETYLOCK #10

## (undated) DEVICE — SAW BLADE LINVATEC SAGITTAL MIC 9.5X25.5X0.4MM